# Patient Record
Sex: FEMALE | Race: WHITE | Employment: UNEMPLOYED | URBAN - METROPOLITAN AREA
[De-identification: names, ages, dates, MRNs, and addresses within clinical notes are randomized per-mention and may not be internally consistent; named-entity substitution may affect disease eponyms.]

---

## 2017-05-17 ENCOUNTER — TRANSFERRED RECORDS (OUTPATIENT)
Dept: HEALTH INFORMATION MANAGEMENT | Facility: CLINIC | Age: 62
End: 2017-05-17

## 2017-05-24 ENCOUNTER — HOSPITAL ENCOUNTER (OUTPATIENT)
Dept: CT IMAGING | Facility: CLINIC | Age: 62
End: 2017-05-24
Attending: FAMILY MEDICINE | Admitting: OBSTETRICS & GYNECOLOGY
Payer: COMMERCIAL

## 2017-05-24 ENCOUNTER — HOSPITAL ENCOUNTER (OUTPATIENT)
Facility: CLINIC | Age: 62
Discharge: HOME | End: 2017-05-24
Attending: OBSTETRICS & GYNECOLOGY | Admitting: OBSTETRICS & GYNECOLOGY
Payer: COMMERCIAL

## 2017-05-24 VITALS
SYSTOLIC BLOOD PRESSURE: 130 MMHG | OXYGEN SATURATION: 94 % | HEART RATE: 71 BPM | RESPIRATION RATE: 18 BRPM | TEMPERATURE: 96.6 F | DIASTOLIC BLOOD PRESSURE: 53 MMHG

## 2017-05-24 DIAGNOSIS — G89.29 CHRONIC LOW BACK PAIN WITHOUT SCIATICA: ICD-10-CM

## 2017-05-24 DIAGNOSIS — M54.50 CHRONIC LOW BACK PAIN WITHOUT SCIATICA: ICD-10-CM

## 2017-05-24 DIAGNOSIS — G89.4 CHRONIC PAIN SYNDROME: ICD-10-CM

## 2017-05-24 DIAGNOSIS — G62.9 NEUROPATHY: ICD-10-CM

## 2017-05-24 PROCEDURE — 72129 CT CHEST SPINE W/DYE: CPT

## 2017-05-24 PROCEDURE — 72132 CT LUMBAR SPINE W/DYE: CPT

## 2017-05-24 PROCEDURE — 72125 CT NECK SPINE W/O DYE: CPT

## 2017-05-24 PROCEDURE — 25000125 ZZHC RX 250: Performed by: OBSTETRICS & GYNECOLOGY

## 2017-05-24 PROCEDURE — 72128 CT CHEST SPINE W/O DYE: CPT

## 2017-05-24 PROCEDURE — 40000863 ZZH STATISTIC RADIOLOGY XRAY, US, CT, MAR, NM

## 2017-05-24 PROCEDURE — 25000128 H RX IP 250 OP 636: Performed by: OBSTETRICS & GYNECOLOGY

## 2017-05-24 PROCEDURE — 72126 CT NECK SPINE W/DYE: CPT

## 2017-05-24 RX ORDER — BUTALBITAL, ACETAMINOPHEN, CAFFEINE AND CODEINE PHOSPHATE 50; 325; 40; 30 MG/1; MG/1; MG/1; MG/1
2 CAPSULE ORAL EVERY 6 HOURS PRN
Status: ON HOLD | COMMUNITY
End: 2020-01-20

## 2017-05-24 RX ORDER — AMOXICILLIN 500 MG/1
500 CAPSULE ORAL DAILY
COMMUNITY

## 2017-05-24 RX ORDER — CLOBETASOL PROPIONATE 0.5 MG/G
CREAM TOPICAL 2 TIMES DAILY PRN
COMMUNITY

## 2017-05-24 RX ORDER — DIAZEPAM 5 MG
5 TABLET ORAL 3 TIMES DAILY
COMMUNITY

## 2017-05-24 RX ORDER — TRIAMTERENE/HYDROCHLOROTHIAZID 37.5-25 MG
1 TABLET ORAL DAILY
Status: ON HOLD | COMMUNITY
End: 2017-08-19

## 2017-05-24 RX ORDER — FENTANYL CITRATE 50 UG/ML
25-50 INJECTION, SOLUTION INTRAMUSCULAR; INTRAVENOUS EVERY 5 MIN PRN
Status: DISCONTINUED | OUTPATIENT
Start: 2017-05-24 | End: 2017-05-24 | Stop reason: HOSPADM

## 2017-05-24 RX ORDER — METHYLPHENIDATE HYDROCHLORIDE 20 MG/1
20 TABLET ORAL 4 TIMES DAILY
COMMUNITY

## 2017-05-24 RX ORDER — OMEPRAZOLE 40 MG/1
40 CAPSULE, DELAYED RELEASE ORAL EVERY MORNING
COMMUNITY

## 2017-05-24 RX ORDER — MULTIVITAMIN,THERAPEUTIC
1 TABLET ORAL DAILY
COMMUNITY

## 2017-05-24 RX ORDER — GABAPENTIN 400 MG/1
800 CAPSULE ORAL 3 TIMES DAILY
COMMUNITY

## 2017-05-24 RX ORDER — NALOXONE HYDROCHLORIDE 0.4 MG/ML
.1-.4 INJECTION, SOLUTION INTRAMUSCULAR; INTRAVENOUS; SUBCUTANEOUS
Status: DISCONTINUED | OUTPATIENT
Start: 2017-05-24 | End: 2017-05-24 | Stop reason: HOSPADM

## 2017-05-24 RX ORDER — FLUMAZENIL 0.1 MG/ML
0.2 INJECTION, SOLUTION INTRAVENOUS
Status: DISCONTINUED | OUTPATIENT
Start: 2017-05-24 | End: 2017-05-24 | Stop reason: HOSPADM

## 2017-05-24 RX ORDER — OXYCODONE HCL 40 MG/1
80 TABLET, FILM COATED, EXTENDED RELEASE ORAL 4 TIMES DAILY
COMMUNITY

## 2017-05-24 RX ORDER — TIZANIDINE HYDROCHLORIDE 2 MG/1
6 CAPSULE, GELATIN COATED ORAL DAILY
COMMUNITY

## 2017-05-24 RX ORDER — HYDROXYZINE PAMOATE 50 MG/1
50 CAPSULE ORAL EVERY 8 HOURS PRN
COMMUNITY

## 2017-05-24 RX ORDER — IBUPROFEN 200 MG
600 TABLET ORAL 4 TIMES DAILY PRN
COMMUNITY

## 2017-05-24 RX ADMIN — MIDAZOLAM HYDROCHLORIDE 1 MG: 1 INJECTION, SOLUTION INTRAMUSCULAR; INTRAVENOUS at 10:25

## 2017-05-24 RX ADMIN — FENTANYL CITRATE 50 MCG: 50 INJECTION, SOLUTION INTRAMUSCULAR; INTRAVENOUS at 10:24

## 2017-05-24 NOTE — PROGRESS NOTES
[facility] Arrival    Reason for Visit: CT with IV sedation    Patient is alert and oriented. C/o pain in back and neck, chronic pain med use, see med list. Labs WNL. All questions & concerns addressed.     Pt resting in bed, denies additional needs at this time, call light within reach. Family at bedside. Daughter and/or  will stay with pt overnight.      1015 Pt to CT at this time.  1025- Fentanyl 50 mcg and Versed 1 mg IV given. Scans taken, O2 per 2L NC on.  1040- Returned to [facility]. Vitals monitored.  1050- daughter called to return to room.  1100- discharge instructions reviewed with patient and daughter.  1110- patient dressing with daughter's assistance  1125- Wheelchair escorted out to waiting car with daughter as

## 2017-05-24 NOTE — IP AVS SNAPSHOT
MRN:[id number]                      After Visit Summary   5/24/2017    Carlie Myers    MRN: [id number]           Visit Information        Department      5/24/2017  8:52 AM [facility]          Review of your medicines      UNREVIEWED medicines. Ask your doctor about these medicines        Dose / Directions    AMBIEN PO        Dose:  12.5 mg   Take 12.5 mg by mouth At Bedtime   Refills:  0       AMOXICILLIN PO        Dose:  500 mg   Take 500 mg by mouth daily Take 4 tabs before and after dental appointments due to mitral valve prolapse   Refills:  0       BUPROPION HCL PO        Dose:  300 mg   Take 300 mg by mouth daily   Refills:  0       butalbital-APAP-caffeine-codeine -96-30 MG per capsule   Commonly known as:  FIORICET WITH codeine   Indication:  Pain        Dose:  2 capsule   Take 2 capsules by mouth 4 times daily   Refills:  0       clobetasol 0.05 % cream   Commonly known as:  TEMOVATE        Dose:  60 g   Apply 60 g topically 2 times daily Apply to affected areas 2 times daily   Refills:  0       etanercept 50 MG/ML injection   Commonly known as:  ENBREL   Indication:  Rheumatoid Arthritis        Dose:  50 mg   Inject 50 mg Subcutaneous once a week   Refills:  0       GABAPENTIN PO        Dose:  800 mg   Take 800 mg by mouth 3 times daily   Refills:  0       HYDROXYZINE PAMOATE PO        Dose:  50 mg   Take 50 mg by mouth every 8 hours   Refills:  0       IBUPROFEN PO        Dose:  600 mg   Take 600 mg by mouth 4 times daily   Refills:  0       multivitamin, therapeutic Tabs tablet        Dose:  1 tablet   Take 1 tablet by mouth daily   Refills:  0       OMEPRAZOLE PO        Dose:  40 mg   Take 40 mg by mouth every morning   Refills:  0       * OXYCONTIN PO   Indication:  Chronic Pain        Dose:  80 mg   Take 80 mg by mouth 4 times daily   Refills:  0       * oxyCODONE 5 MG IR tablet   Commonly known as:  ROXICODONE        Dose:  5-10 mg   Take 1-2 tablets  "(5-10 mg) by mouth every 4 hours as needed for pain   Quantity:  30 tablet   Refills:  0       RITALIN PO        Dose:  20 mg   Take 20 mg by mouth 4 times daily   Refills:  0       SERTRALINE HCL PO        Dose:  100 mg   Take 100 mg by mouth daily Take one and a half tablets by mouth daily   Refills:  0       triamterene-hydrochlorothiazide 37.5-25 MG per tablet   Commonly known as:  MAXZIDE-25        Dose:  1 tablet   Take 1 tablet by mouth daily   Refills:  0       VALIUM PO        Dose:  5 mg   Take 5 mg by mouth 3 times daily   Refills:  0       ZANAFLEX PO        Dose:  2 mg   Take 2 mg by mouth 2 times daily   Refills:  0       * Notice:  This list has 2 medication(s) that are the same as other medications prescribed for you. Read the directions carefully, and ask your doctor or other care provider to review them with you.             Protect others around you: Learn how to safely use, store and throw away your medicines at www.disposemymeds.org.         Follow-ups after your visit         Care Instructions        Further instructions from your care team       Sedation Discharge Instructions     After you go home:      You may resume your normal diet    Have an adult stay with you for 6 hours if you received sedation       For 24 hours - due to the sedation you received:    Relax and take it easy    Do NOT make any important or legal decisions    Do NOT drive or operate machines at home or at work    Do NOT drink alcohol    Activity       You may go back to normal activity in 24 hours    Medicines:      You may resume all medications                   If you have questions call:          [facility] Radiology Dept @ [phone]       Additional Information About Your Visit        MyChart Information     [employer] lets you send messages to your doctor, view your test results, renew your prescriptions, schedule appointments and more. To sign up, go to [email or web address] . Click on \"Log in\" on the " "left side of the screen, which will take you to the Welcome page. Then click on \"Sign up Now\" on the right side of the page.     You will be asked to enter the access code listed below, as well as some personal information. Please follow the directions to create your username and password.     Your access code is: [email or web address]  Expires: 2017 11:00 AM     Your access code will  in 90 days. If you need help or a new code, please call your [facility] clinic or [phone].        Care EveryWhere ID     This is your Care EveryWhere ID. This could be used by other organizations to access your [facility] medical records  [id number]        Your Vitals Were     Blood Pressure Pulse Temperature Respirations Pulse Oximetry       126/53 71 96.6  F (35.9  C) (Oral) 18 95%        Primary Care Provider Office Phone # Fax #    Tej Loyola [phone]      Thank you!     Thank you for choosing [facility] for your care. Our goal is always to provide you with excellent care. Hearing back from our patients is one way we can continue to improve our services. Please take a few minutes to complete the written survey that you may receive in the mail after you visit with us. Thank you!             Medication List: This is a list of all your medications and when to take them. Check marks below indicate your daily home schedule. Keep this list as a reference.      Medications           Morning Afternoon Evening Bedtime As Needed    AMBIEN PO   Take 12.5 mg by mouth At Bedtime                                AMOXICILLIN PO   Take 500 mg by mouth daily Take 4 tabs before and after dental appointments due to mitral valve prolapse                                BUPROPION HCL PO   Take 300 mg by mouth daily                                butalbital-APAP-caffeine-codeine -39-30 MG per capsule   Commonly known as:  FIORICET WITH codeine   Take 2 capsules by mouth 4 times daily                                " clobetasol 0.05 % cream   Commonly known as:  TEMOVATE   Apply 60 g topically 2 times daily Apply to affected areas 2 times daily                                etanercept 50 MG/ML injection   Commonly known as:  ENBREL   Inject 50 mg Subcutaneous once a week                                GABAPENTIN PO   Take 800 mg by mouth 3 times daily                                HYDROXYZINE PAMOATE PO   Take 50 mg by mouth every 8 hours                                IBUPROFEN PO   Take 600 mg by mouth 4 times daily                                multivitamin, therapeutic Tabs tablet   Take 1 tablet by mouth daily                                OMEPRAZOLE PO   Take 40 mg by mouth every morning                                * OXYCONTIN PO   Take 80 mg by mouth 4 times daily                                * oxyCODONE 5 MG IR tablet   Commonly known as:  ROXICODONE   Take 1-2 tablets (5-10 mg) by mouth every 4 hours as needed for pain                                RITALIN PO   Take 20 mg by mouth 4 times daily                                SERTRALINE HCL PO   Take 100 mg by mouth daily Take one and a half tablets by mouth daily                                triamterene-hydrochlorothiazide 37.5-25 MG per tablet   Commonly known as:  MAXZIDE-25   Take 1 tablet by mouth daily                                VALIUM PO   Take 5 mg by mouth 3 times daily                                ZANAFLEX PO   Take 2 mg by mouth 2 times daily                                * Notice:  This list has 2 medication(s) that are the same as other medications prescribed for you. Read the directions carefully, and ask your doctor or other care provider to review them with you.

## 2017-05-24 NOTE — DISCHARGE INSTRUCTIONS
Sedation Discharge Instructions     After you go home:      You may resume your normal diet    Have an adult stay with you for 6 hours if you received sedation       For 24 hours - due to the sedation you received:    Relax and take it easy    Do NOT make any important or legal decisions    Do NOT drive or operate machines at home or at work    Do NOT drink alcohol    Activity       You may go back to normal activity in 24 hours    Medicines:      You may resume all medications                   If you have questions call:          [facility] Radiology Dept @ [phone]

## 2017-05-24 NOTE — IP AVS SNAPSHOT
[place] [address]    [place] [address]    Phone:  [phone]                                       After Visit Summary   5/24/2017    Carlie Myers    MRN: [id number]           After Visit Summary Signature Page     I have received my discharge instructions, and my questions have been answered. I have discussed any challenges I see with this plan with the nurse or doctor.    ..........................................................................................................................................  Patient/Patient Representative Signature      ..........................................................................................................................................  Patient Representative Print Name and Relationship to Patient    ..................................................               ................................................  Date                                            Time    ..........................................................................................................................................  Reviewed by Signature/Title    ...................................................              ..............................................  Date                                                            Time

## 2017-08-06 ENCOUNTER — HOSPITAL ENCOUNTER (INPATIENT)
Facility: CLINIC | Age: 62
LOS: 12 days | Discharge: HOME HEALTH | DRG: 853 | End: 2017-08-19
Attending: INTERNAL MEDICINE | Admitting: INTERNAL MEDICINE
Payer: MEDICARE

## 2017-08-06 ENCOUNTER — TRANSFERRED RECORDS (OUTPATIENT)
Dept: HEALTH INFORMATION MANAGEMENT | Facility: CLINIC | Age: 62
End: 2017-08-06

## 2017-08-06 DIAGNOSIS — J18.9 PARAPNEUMONIC EFFUSION: Primary | ICD-10-CM

## 2017-08-06 DIAGNOSIS — J91.8 PARAPNEUMONIC EFFUSION: Primary | ICD-10-CM

## 2017-08-06 NOTE — IP AVS SNAPSHOT
[place] [facility]    [address] [place] [address]    Phone:  [phone]                                       After Visit Summary   8/6/2017    Carlie Myers    MRN: [id number]           After Visit Summary Signature Page     I have received my discharge instructions, and my questions have been answered. I have discussed any challenges I see with this plan with the nurse or doctor.    ..........................................................................................................................................  Patient/Patient Representative Signature      ..........................................................................................................................................  Patient Representative Print Name and Relationship to Patient    ..................................................               ................................................  Date                                            Time    ..........................................................................................................................................  Reviewed by Signature/Title    ...................................................              ..............................................  Date                                                            Time

## 2017-08-06 NOTE — IP AVS SNAPSHOT
MRN:[id number]                      After Visit Summary   8/6/2017    Carlie Myers    MRN: [id number]           Thank you!     Thank you for choosing [facility] for your care. Our goal is always to provide you with excellent care. Hearing back from our patients is one way we can continue to improve our services. Please take a few minutes to complete the written survey that you may receive in the mail after you visit with us. Thank you!        Patient Information     Date Of Birth          1955        Designated Caregiver       Most Recent Value    Caregiver    Will someone help with your care after discharge? no      About your hospital stay     You were admitted on:  August 6, 2017 You last received care in the:  [place] [address] [facility]    You were discharged on:  August 19, 2017        Reason for your hospital stay       Parapneumonic effusion and sepsis with 3 chest tubes placed                  Who to Call     For medical emergencies, please call 911.  For non-urgent questions about your medical care, please call your primary care provider or clinic, [phone]  For questions related to your surgery, please call your surgery clinic        Attending Provider     Provider Specialty    Jennifer Whitlock MD Internal Medicine    Charlotte Fu MD Internal Medicine       Primary Care Provider Office Phone # Fax #    Tej Loyola [phone]      After Care Instructions     Activity       Your activity upon discharge: activity as tolerated            Diet       Follow this diet upon discharge: Orders Placed This Encounter      Snacks/Supplements Adult: Ensure Plus (Adult); Between Meals      Regular Diet Adult                  Follow-up Appointments     Follow-up and recommended labs and tests        Please follow with Dr. Tej Loyola at [facility] within 7 days. Please call to schedule this appointment [phone]. Recommend  that BMP be rechecked and blood pressure medications be reviewed.  Follow with your rheumatologist in the next 2-3 weeks. Hold Enbrel x 3 more weeks and discuss with rheumatologist before resuming.  Follow up with Dr. Ramesh (Thoracic surgeon) in at [facility] on 8/28. Call office at [phone] for appointment time.                  Additional Services     Home Care OT Referral for Hospital Discharge       OT to eval and treat    Your provider has ordered home care - occupational therapy. If you have not been contacted within 2 days of your discharge please call the department phone number listed on the top of this document.            Home Care PT Referral for Hospital Discharge       PT to eval and treat    Your provider has ordered home care - physical therapy. If you have not been contacted within 2 days of your discharge please call the department phone number listed on the top of this document.                  Further instructions from your care team       [facility]  Discharge Orders & Follow-up Care  Video-Assisted: Thoracoscopy - Thoracotomy    Call Ragini at Dr. Ramesh  office at [phone] to make a return appointment with a chest x-ray on Monday 08-28.  Your appointment will be with either Tonya Becker PA-C or Mary Gilbert PA-C, or Dr. Ramesh.      Our office is located at:  [facility], [address] [place] [address], [address], [place] [address].  Ragini will explain where to park when you call for an appointment.    A. Patient Care  Call Dr Ramesh  office @ [phone] if:  ? Severe chills or a fever or 100.4 F.  temperature on two occasions  ? Increased incisional pain and/or redness  ? Presence of unusual incisional or chest tube site drainage  ? Coughing up bright red blood or greenish-yellow secretions  ? Significant increase in shortness of breath    Pain Relief  You have been given a prescription for narcotic pain medicine.  You may also  "take ibuprofen and acetaminophen over the counter.  Recommended dosages are:  600 mg Ibuprofen every 6 hours as needed and 650 mg Acetaminophen every 4 hours as needed.  Many patients get good pain relief by \"staggering\" these medications.     No driving while on narcotics.     Activity  XX No activity restrictions for a scope procedure/thoracoscopy  ___ No heavy lifting greater than 8-10 pounds on the operative side for 4-6 weeks for a thoracotomy    Wound Care  Remove all dressings in 48 hours and then you may shower.  Wash the incision and chest tube site(s) daily with soap and water. No bathing or immersing incision underwater for approximately 2 weeks or until the chest tube sites are completely healed.     Place a dry gauze dressing over the chest tube site(s) because it(they) will drain a few days.  Do not be alarmed if there is drainage of a large amount of fluid (should be pink or yellow-- or somewhat bloody) either spontaneously or with coughing or exertion. If this happens, just place a large dry gauze dressing over the chest tube site-- it will stop and scab over in about a week or so. Once the drainage stops, then leave the chest tube site(s) open to air.     White steri strips will be present on the incision(s). They will peel off within about 10 days-- otherwise, they will be removed at your post-op appointment.     B. Respiratory  XX Utilize Incentive spirometer 10 times in a row every few hours while awake for a few weeks after discharging home from the hospital        Pending Results     Date and Time Order Name Status Description    8/14/2017 1842 Fungus Culture, non-blood Preliminary     8/14/2017 1842 Anaerobic bacterial culture Preliminary     8/14/2017 1842 AFB Culture Non Blood Preliminary     8/7/2017 0143 Cytology In process             Statement of Approval     Ordered          08/19/17 1508  I have reviewed and agree with all the recommendations and orders detailed in this document.  " "EFFECTIVE NOW     Approved and electronically signed by:  Kaitlin Lai MD             Admission Information     Date & Time Provider Department Dept. Phone    2017 Charlotte Fu MD [place] [address] [facility] [phone]      Your Vitals Were     Blood Pressure Pulse Temperature Respirations Height Weight    147/72 (BP Location: Right arm) 98 98.8  F (37.1  C) (Oral) 16 1.651 m (5' 5\") 78.5 kg (173 lb 1 oz)    Pulse Oximetry BMI (Body Mass Index)                91% 28.8 kg/m2          MyChart Information     [employer] lets you send messages to your doctor, view your test results, renew your prescriptions, schedule appointments and more. To sign up, go to [email or web address] . Click on \"Log in\" on the left side of the screen, which will take you to the Welcome page. Then click on \"Sign up Now\" on the right side of the page.     You will be asked to enter the access code listed below, as well as some personal information. Please follow the directions to create your username and password.     Your access code is: [email or web address]  Expires: 2017 11:00 AM     Your access code will  in 90 days. If you need help or a new code, please call your [place] clinic or [phone].        Care EveryWhere ID     This is your Care EveryWhere ID. This could be used by other organizations to access your [place] medical records  [id number]        Equal Access to Services     [facility] AH: Hadii aneta ku hadasho Soomaali, waaxda luqadaha, qaybta kaalmada adeegyada, pao mckenzie . So [facility] [phone].    ATENCIÓN: Si habla español, tiene a [place] disposición servicios gratuitos de asistencia lingüística. Llame al [phone].    We comply with applicable federal civil rights laws and [place] laws. We do not discriminate on the basis of race, color, national origin, age, disability sex, sexual orientation or gender identity.               Review of your medicines "      START taking        Dose / Directions    amoxicillin-clavulanate 875-125 MG per tablet   Commonly known as:  AUGMENTIN        Dose:  1 tablet   Take 1 tablet by mouth 2 times daily for 14 days . Take first dose evening of 8/19.   Quantity:  28 tablet   Refills:  0       dextromethorphan-guaiFENesin  MG per 12 hr tablet   Commonly known as:  MUCINEX DM        Dose:  1 tablet   Take 1 tablet by mouth 2 times daily   Quantity:  28 tablet   Refills:  0         CONTINUE these medicines which have NOT CHANGED        Dose / Directions    AMBIEN CR PO        Dose:  12.5 mg   Take 12.5 mg by mouth At Bedtime   Refills:  0       AMOXICILLIN PO        Dose:  500 mg   Take 500 mg by mouth daily Take 4 tabs before and after dental appointments due to mitral valve prolapse   Refills:  0       butalbital-APAP-caffeine-codeine -03-30 MG per capsule   Commonly known as:  FIORICET WITH codeine   Indication:  Migraine Headache        Dose:  2 capsule   Take 2 capsules by mouth every 6 hours as needed   Refills:  0       clobetasol 0.05 % cream   Commonly known as:  TEMOVATE   Indication:  psoriasis        Apply topically 2 times daily as needed (psoriasis)   Refills:  0       GABAPENTIN PO        Dose:  800 mg   Take 800 mg by mouth 3 times daily   Refills:  0       HYDROXYZINE PAMOATE PO        Dose:  50 mg   Take 50 mg by mouth every 8 hours   Refills:  0       IBUPROFEN PO        Dose:  600 mg   Take 600 mg by mouth 4 times daily   Refills:  0       multivitamin, therapeutic Tabs tablet        Dose:  1 tablet   Take 1 tablet by mouth daily   Refills:  0       OMEPRAZOLE PO        Dose:  40 mg   Take 40 mg by mouth every morning   Refills:  0       * OXYCONTIN PO   Indication:  Chronic Pain        Dose:  80 mg   Take 80 mg by mouth 4 times daily   Refills:  0       * OXYCODONE HCL PO        Dose:  30 mg   Take 30 mg by mouth 5 times daily   Refills:  0       RITALIN PO        Dose:  20 mg   Take 20 mg by mouth 4  times daily   Refills:  0       VALIUM PO        Dose:  5 mg   Take 5 mg by mouth 3 times daily   Refills:  0       WELLBUTRIN XL PO        Dose:  300 mg   Take 300 mg by mouth daily   Refills:  0       ZANAFLEX PO        Dose:  2 mg   Take 2 mg by mouth 2 times daily   Refills:  0       ZOLOFT PO        Dose:  150 mg   Take 150 mg by mouth daily   Refills:  0       * Notice:  This list has 2 medication(s) that are the same as other medications prescribed for you. Read the directions carefully, and ask your doctor or other care provider to review them with you.      STOP taking     etanercept 50 MG/ML injection   Commonly known as:  ENBREL           triamterene-hydrochlorothiazide 37.5-25 MG per tablet   Commonly known as:  MAXZIDE-25                Where to get your medicines      These medications were sent to [facility] - [place], [place] - [address] [facility] [address]  [address], [address]\[facility] [place] [facility]\""[facility] [address]    Hours:  24-hours Phone:  [phone]     amoxicillin-clavulanate 875-125 MG per tablet         Some of these will need a paper prescription and others can be bought over the counter. Ask your nurse if you have questions.     You don't need a prescription for these medications     dextromethorphan-guaiFENesin  MG per 12 hr tablet                Protect others around you: Learn how to safely use, store and throw away your medicines at www.disposemymeds.org.             Medication List: This is a list of all your medications and when to take them. Check marks below indicate your daily home schedule. Keep this list as a reference.      Medications           Morning Afternoon Evening Bedtime As Needed    AMBIEN CR PO   Take 12.5 mg by mouth At Bedtime                                AMOXICILLIN PO   Take 500 mg by mouth daily Take 4 tabs before and after dental appointments due to mitral valve prolapse                                amoxicillin-clavulanate 875-125  MG per tablet   Commonly known as:  AUGMENTIN   Take 1 tablet by mouth 2 times daily for 14 days . Take first dose evening of 8/19.                                butalbital-APAP-caffeine-codeine -47-30 MG per capsule   Commonly known as:  FIORICET WITH codeine   Take 2 capsules by mouth every 6 hours as needed   Last time this was given:  2 capsules on 8/19/2017  8:57 AM                                clobetasol 0.05 % cream   Commonly known as:  TEMOVATE   Apply topically 2 times daily as needed (psoriasis)                                dextromethorphan-guaiFENesin  MG per 12 hr tablet   Commonly known as:  MUCINEX DM   Take 1 tablet by mouth 2 times daily   Last time this was given:  1 tablet on 8/19/2017  8:59 AM                                GABAPENTIN PO   Take 800 mg by mouth 3 times daily   Last time this was given:  800 mg on 8/19/2017  8:58 AM                                HYDROXYZINE PAMOATE PO   Take 50 mg by mouth every 8 hours   Last time this was given:  50 mg on 8/19/2017  8:57 AM                                IBUPROFEN PO   Take 600 mg by mouth 4 times daily   Last time this was given:  600 mg on 8/19/2017  1:00 PM                                multivitamin, therapeutic Tabs tablet   Take 1 tablet by mouth daily   Last time this was given:  1 tablet on 8/19/2017  8:59 AM                                OMEPRAZOLE PO   Take 40 mg by mouth every morning   Last time this was given:  40 mg on 8/19/2017  8:59 AM                                * OXYCONTIN PO   Take 80 mg by mouth 4 times daily   Last time this was given:  80 mg on 8/19/2017 10:16 AM                                * OXYCODONE HCL PO   Take 30 mg by mouth 5 times daily   Last time this was given:  30 mg on 8/19/2017  1:00 PM                                RITALIN PO   Take 20 mg by mouth 4 times daily   Last time this was given:  20 mg on 8/19/2017  2:31 PM                                VALIUM PO   Take 5 mg by mouth 3  times daily   Last time this was given:  5 mg on 8/19/2017  8:58 AM                                WELLBUTRIN XL PO   Take 300 mg by mouth daily   Last time this was given:  300 mg on 8/19/2017  8:58 AM                                ZANAFLEX PO   Take 2 mg by mouth 2 times daily   Last time this was given:  2 mg on 8/19/2017  8:58 AM                                ZOLOFT PO   Take 150 mg by mouth daily   Last time this was given:  150 mg on 8/19/2017  8:57 AM                                * Notice:  This list has 2 medication(s) that are the same as other medications prescribed for you. Read the directions carefully, and ask your doctor or other care provider to review them with you.

## 2017-08-07 ENCOUNTER — APPOINTMENT (OUTPATIENT)
Dept: GENERAL RADIOLOGY | Facility: CLINIC | Age: 62
DRG: 853 | End: 2017-08-07
Attending: INTERNAL MEDICINE
Payer: MEDICARE

## 2017-08-07 PROBLEM — A41.9 SEPSIS (H): Status: ACTIVE | Noted: 2017-08-07

## 2017-08-07 LAB
ALBUMIN SERPL-MCNC: 2.4 G/DL (ref 3.4–5)
ALBUMIN UR-MCNC: 30 MG/DL
ALP SERPL-CCNC: 100 U/L (ref 40–150)
ALT SERPL W P-5'-P-CCNC: 66 U/L (ref 0–50)
ANION GAP SERPL CALCULATED.3IONS-SCNC: 8 MMOL/L (ref 3–14)
APPEARANCE UR: CLEAR
AST SERPL W P-5'-P-CCNC: 21 U/L (ref 0–45)
BASOPHILS # BLD AUTO: 0 10E9/L (ref 0–0.2)
BASOPHILS NFR BLD AUTO: 0.2 %
BILIRUB SERPL-MCNC: 0.3 MG/DL (ref 0.2–1.3)
BILIRUB UR QL STRIP: NEGATIVE
BUN SERPL-MCNC: 11 MG/DL (ref 7–30)
CALCIUM SERPL-MCNC: 7.8 MG/DL (ref 8.5–10.1)
CHLORIDE SERPL-SCNC: 97 MMOL/L (ref 94–109)
CO2 SERPL-SCNC: 31 MMOL/L (ref 20–32)
COLOR UR AUTO: YELLOW
CREAT SERPL-MCNC: 0.63 MG/DL (ref 0.52–1.04)
DIFFERENTIAL METHOD BLD: ABNORMAL
EOSINOPHIL # BLD AUTO: 0.8 10E9/L (ref 0–0.7)
EOSINOPHIL NFR BLD AUTO: 6.5 %
ERYTHROCYTE [DISTWIDTH] IN BLOOD BY AUTOMATED COUNT: 13.9 % (ref 10–15)
GFR SERPL CREATININE-BSD FRML MDRD: ABNORMAL ML/MIN/1.7M2
GLUCOSE SERPL-MCNC: 115 MG/DL (ref 70–99)
GLUCOSE UR STRIP-MCNC: NEGATIVE MG/DL
HCT VFR BLD AUTO: 31.5 % (ref 35–47)
HGB BLD-MCNC: 10.3 G/DL (ref 11.7–15.7)
HGB UR QL STRIP: NEGATIVE
IMM GRANULOCYTES # BLD: 0 10E9/L (ref 0–0.4)
IMM GRANULOCYTES NFR BLD: 0.2 %
INR PPP: 1.1 (ref 0.86–1.14)
KETONES UR STRIP-MCNC: NEGATIVE MG/DL
LACTATE SERPL-SCNC: 0.6 MMOL/L (ref 0.4–2)
LDH SERPL L TO P-CCNC: 170 U/L (ref 81–234)
LEUKOCYTE ESTERASE UR QL STRIP: NEGATIVE
LYMPHOCYTES # BLD AUTO: 1.9 10E9/L (ref 0.8–5.3)
LYMPHOCYTES NFR BLD AUTO: 15.4 %
MAGNESIUM SERPL-MCNC: 2 MG/DL (ref 1.6–2.3)
MCH RBC QN AUTO: 30.3 PG (ref 26.5–33)
MCHC RBC AUTO-ENTMCNC: 32.7 G/DL (ref 31.5–36.5)
MCV RBC AUTO: 93 FL (ref 78–100)
MONOCYTES # BLD AUTO: 1.4 10E9/L (ref 0–1.3)
MONOCYTES NFR BLD AUTO: 12 %
MUCOUS THREADS #/AREA URNS LPF: PRESENT /LPF
NEUTROPHILS # BLD AUTO: 7.9 10E9/L (ref 1.6–8.3)
NEUTROPHILS NFR BLD AUTO: 65.7 %
NITRATE UR QL: NEGATIVE
PH UR STRIP: 6 PH (ref 5–7)
PLATELET # BLD AUTO: 262 10E9/L (ref 150–450)
POTASSIUM SERPL-SCNC: 3 MMOL/L (ref 3.4–5.3)
POTASSIUM SERPL-SCNC: 3.6 MMOL/L (ref 3.4–5.3)
PROCALCITONIN SERPL-MCNC: 0.89 NG/ML
PROT SERPL-MCNC: 6.4 G/DL (ref 6.8–8.8)
PROT SERPL-MCNC: 6.4 G/DL (ref 6.8–8.8)
RBC # BLD AUTO: 3.4 10E12/L (ref 3.8–5.2)
RBC #/AREA URNS AUTO: 4 /HPF (ref 0–2)
SODIUM SERPL-SCNC: 136 MMOL/L (ref 133–144)
SP GR UR STRIP: 1.02 (ref 1–1.03)
SQUAMOUS #/AREA URNS AUTO: 1 /HPF (ref 0–1)
TROPONIN I SERPL-MCNC: NORMAL UG/L (ref 0–0.04)
URN SPEC COLLECT METH UR: ABNORMAL
UROBILINOGEN UR STRIP-MCNC: NORMAL MG/DL (ref 0–2)
WBC # BLD AUTO: 12 10E9/L (ref 4–11)
WBC #/AREA URNS AUTO: 4 /HPF (ref 0–2)

## 2017-08-07 PROCEDURE — 40000141 ZZH STATISTIC PERIPHERAL IV START W/O US GUIDANCE

## 2017-08-07 PROCEDURE — 84155 ASSAY OF PROTEIN SERUM: CPT | Performed by: INTERNAL MEDICINE

## 2017-08-07 PROCEDURE — 85610 PROTHROMBIN TIME: CPT | Performed by: INTERNAL MEDICINE

## 2017-08-07 PROCEDURE — 85025 COMPLETE CBC W/AUTO DIFF WBC: CPT | Performed by: INTERNAL MEDICINE

## 2017-08-07 PROCEDURE — 99223 1ST HOSP IP/OBS HIGH 75: CPT | Mod: AI | Performed by: INTERNAL MEDICINE

## 2017-08-07 PROCEDURE — A9270 NON-COVERED ITEM OR SERVICE: HCPCS | Mod: GY | Performed by: INTERNAL MEDICINE

## 2017-08-07 PROCEDURE — 84132 ASSAY OF SERUM POTASSIUM: CPT | Performed by: INTERNAL MEDICINE

## 2017-08-07 PROCEDURE — 87040 BLOOD CULTURE FOR BACTERIA: CPT | Performed by: INTERNAL MEDICINE

## 2017-08-07 PROCEDURE — 25000132 ZZH RX MED GY IP 250 OP 250 PS 637: Mod: GY | Performed by: INTERNAL MEDICINE

## 2017-08-07 PROCEDURE — 12000000 ZZH R&B MED SURG/OB

## 2017-08-07 PROCEDURE — 36415 COLL VENOUS BLD VENIPUNCTURE: CPT | Performed by: INTERNAL MEDICINE

## 2017-08-07 PROCEDURE — 83735 ASSAY OF MAGNESIUM: CPT | Performed by: INTERNAL MEDICINE

## 2017-08-07 PROCEDURE — 80053 COMPREHEN METABOLIC PANEL: CPT | Performed by: INTERNAL MEDICINE

## 2017-08-07 PROCEDURE — 71010 XR CHEST PORT 1 VW: CPT

## 2017-08-07 PROCEDURE — 83615 LACTATE (LD) (LDH) ENZYME: CPT | Performed by: INTERNAL MEDICINE

## 2017-08-07 PROCEDURE — 83605 ASSAY OF LACTIC ACID: CPT | Performed by: INTERNAL MEDICINE

## 2017-08-07 PROCEDURE — 25000128 H RX IP 250 OP 636: Performed by: INTERNAL MEDICINE

## 2017-08-07 PROCEDURE — 84484 ASSAY OF TROPONIN QUANT: CPT | Performed by: INTERNAL MEDICINE

## 2017-08-07 PROCEDURE — 81001 URINALYSIS AUTO W/SCOPE: CPT | Performed by: INTERNAL MEDICINE

## 2017-08-07 PROCEDURE — 84145 PROCALCITONIN (PCT): CPT | Performed by: INTERNAL MEDICINE

## 2017-08-07 RX ORDER — ZOLPIDEM TARTRATE 5 MG/1
10 TABLET ORAL
Status: DISCONTINUED | OUTPATIENT
Start: 2017-08-07 | End: 2017-08-19 | Stop reason: HOSPADM

## 2017-08-07 RX ORDER — TIZANIDINE 2 MG/1
2 TABLET ORAL 2 TIMES DAILY
Status: DISCONTINUED | OUTPATIENT
Start: 2017-08-07 | End: 2017-08-19 | Stop reason: HOSPADM

## 2017-08-07 RX ORDER — BUPROPION HYDROCHLORIDE 300 MG/1
300 TABLET ORAL DAILY
Status: DISCONTINUED | OUTPATIENT
Start: 2017-08-07 | End: 2017-08-19 | Stop reason: HOSPADM

## 2017-08-07 RX ORDER — NALOXONE HYDROCHLORIDE 0.4 MG/ML
.1-.4 INJECTION, SOLUTION INTRAMUSCULAR; INTRAVENOUS; SUBCUTANEOUS
Status: DISCONTINUED | OUTPATIENT
Start: 2017-08-07 | End: 2017-08-14

## 2017-08-07 RX ORDER — OXYCODONE HYDROCHLORIDE 5 MG/1
15 TABLET ORAL EVERY 4 HOURS PRN
Status: DISCONTINUED | OUTPATIENT
Start: 2017-08-07 | End: 2017-08-14

## 2017-08-07 RX ORDER — OXYCODONE HYDROCHLORIDE 5 MG/1
5-10 TABLET ORAL
Status: DISCONTINUED | OUTPATIENT
Start: 2017-08-07 | End: 2017-08-14

## 2017-08-07 RX ORDER — POTASSIUM CHLORIDE 29.8 MG/ML
20 INJECTION INTRAVENOUS
Status: DISCONTINUED | OUTPATIENT
Start: 2017-08-07 | End: 2017-08-19 | Stop reason: HOSPADM

## 2017-08-07 RX ORDER — DIAZEPAM 5 MG
5 TABLET ORAL 3 TIMES DAILY
Status: DISCONTINUED | OUTPATIENT
Start: 2017-08-07 | End: 2017-08-19 | Stop reason: HOSPADM

## 2017-08-07 RX ORDER — POTASSIUM CHLORIDE 1.5 G/1.58G
20-40 POWDER, FOR SOLUTION ORAL
Status: DISCONTINUED | OUTPATIENT
Start: 2017-08-07 | End: 2017-08-19 | Stop reason: HOSPADM

## 2017-08-07 RX ORDER — BUPROPION HYDROCHLORIDE 100 MG/1
300 TABLET ORAL DAILY
Status: DISCONTINUED | OUTPATIENT
Start: 2017-08-07 | End: 2017-08-07

## 2017-08-07 RX ORDER — CEFTRIAXONE 1 G/1
1 INJECTION, POWDER, FOR SOLUTION INTRAMUSCULAR; INTRAVENOUS EVERY 24 HOURS
Status: DISCONTINUED | OUTPATIENT
Start: 2017-08-07 | End: 2017-08-12

## 2017-08-07 RX ORDER — IBUPROFEN 600 MG/1
600 TABLET, FILM COATED ORAL 4 TIMES DAILY
Status: DISCONTINUED | OUTPATIENT
Start: 2017-08-07 | End: 2017-08-19 | Stop reason: HOSPADM

## 2017-08-07 RX ORDER — AZITHROMYCIN 250 MG/1
250 TABLET, FILM COATED ORAL DAILY
Status: COMPLETED | OUTPATIENT
Start: 2017-08-08 | End: 2017-08-11

## 2017-08-07 RX ORDER — LIDOCAINE 40 MG/G
CREAM TOPICAL
Status: DISCONTINUED | OUTPATIENT
Start: 2017-08-07 | End: 2017-08-14 | Stop reason: HOSPADM

## 2017-08-07 RX ORDER — ACETAMINOPHEN 325 MG/1
650 TABLET ORAL EVERY 4 HOURS PRN
Status: DISCONTINUED | OUTPATIENT
Start: 2017-08-07 | End: 2017-08-19 | Stop reason: HOSPADM

## 2017-08-07 RX ORDER — METHYLPHENIDATE HYDROCHLORIDE 10 MG/1
20 TABLET ORAL 4 TIMES DAILY
Status: DISCONTINUED | OUTPATIENT
Start: 2017-08-07 | End: 2017-08-19 | Stop reason: HOSPADM

## 2017-08-07 RX ORDER — HYDROMORPHONE HYDROCHLORIDE 1 MG/ML
.3-.5 INJECTION, SOLUTION INTRAMUSCULAR; INTRAVENOUS; SUBCUTANEOUS
Status: DISCONTINUED | OUTPATIENT
Start: 2017-08-07 | End: 2017-08-07

## 2017-08-07 RX ORDER — POTASSIUM CHLORIDE 1500 MG/1
20-40 TABLET, EXTENDED RELEASE ORAL
Status: DISCONTINUED | OUTPATIENT
Start: 2017-08-07 | End: 2017-08-19 | Stop reason: HOSPADM

## 2017-08-07 RX ORDER — HYDROXYZINE PAMOATE 25 MG/1
50 CAPSULE ORAL 3 TIMES DAILY
Status: DISCONTINUED | OUTPATIENT
Start: 2017-08-07 | End: 2017-08-19 | Stop reason: HOSPADM

## 2017-08-07 RX ORDER — CLOBETASOL PROPIONATE 0.5 MG/G
CREAM TOPICAL 2 TIMES DAILY PRN
Status: DISCONTINUED | OUTPATIENT
Start: 2017-08-07 | End: 2017-08-19 | Stop reason: HOSPADM

## 2017-08-07 RX ORDER — TRIAMTERENE/HYDROCHLOROTHIAZID 37.5-25 MG
1 TABLET ORAL DAILY
Status: DISCONTINUED | OUTPATIENT
Start: 2017-08-07 | End: 2017-08-13

## 2017-08-07 RX ORDER — MAGNESIUM SULFATE HEPTAHYDRATE 40 MG/ML
4 INJECTION, SOLUTION INTRAVENOUS EVERY 4 HOURS PRN
Status: DISCONTINUED | OUTPATIENT
Start: 2017-08-07 | End: 2017-08-19 | Stop reason: HOSPADM

## 2017-08-07 RX ORDER — BUTALBITAL, ACETAMINOPHEN, CAFFEINE AND CODEINE PHOSPHATE 50; 325; 40; 30 MG/1; MG/1; MG/1; MG/1
2 CAPSULE ORAL EVERY 6 HOURS PRN
Status: DISCONTINUED | OUTPATIENT
Start: 2017-08-07 | End: 2017-08-15

## 2017-08-07 RX ORDER — BUPROPION HYDROCHLORIDE 300 MG/1
300 TABLET ORAL DAILY
COMMUNITY

## 2017-08-07 RX ORDER — MULTIVITAMIN,THERAPEUTIC
1 TABLET ORAL DAILY
Status: DISCONTINUED | OUTPATIENT
Start: 2017-08-07 | End: 2017-08-19 | Stop reason: HOSPADM

## 2017-08-07 RX ORDER — ZOLPIDEM TARTRATE 12.5 MG/1
12.5 TABLET, FILM COATED, EXTENDED RELEASE ORAL AT BEDTIME
COMMUNITY

## 2017-08-07 RX ORDER — POTASSIUM CL/LIDO/0.9 % NACL 10MEQ/0.1L
10 INTRAVENOUS SOLUTION, PIGGYBACK (ML) INTRAVENOUS
Status: DISCONTINUED | OUTPATIENT
Start: 2017-08-07 | End: 2017-08-19 | Stop reason: HOSPADM

## 2017-08-07 RX ORDER — CALCIUM CARBONATE 500 MG/1
500-1000 TABLET, CHEWABLE ORAL
Status: DISCONTINUED | OUTPATIENT
Start: 2017-08-07 | End: 2017-08-19 | Stop reason: HOSPADM

## 2017-08-07 RX ORDER — ONDANSETRON 2 MG/ML
4 INJECTION INTRAMUSCULAR; INTRAVENOUS EVERY 6 HOURS PRN
Status: DISCONTINUED | OUTPATIENT
Start: 2017-08-07 | End: 2017-08-19 | Stop reason: HOSPADM

## 2017-08-07 RX ORDER — GABAPENTIN 800 MG/1
800 TABLET ORAL 3 TIMES DAILY
Status: DISCONTINUED | OUTPATIENT
Start: 2017-08-07 | End: 2017-08-19 | Stop reason: HOSPADM

## 2017-08-07 RX ORDER — OXYCODONE HCL 40 MG/1
80 TABLET, FILM COATED, EXTENDED RELEASE ORAL 4 TIMES DAILY
Status: DISCONTINUED | OUTPATIENT
Start: 2017-08-07 | End: 2017-08-15

## 2017-08-07 RX ORDER — SODIUM CHLORIDE 9 MG/ML
INJECTION, SOLUTION INTRAVENOUS CONTINUOUS
Status: DISCONTINUED | OUTPATIENT
Start: 2017-08-07 | End: 2017-08-07

## 2017-08-07 RX ORDER — POTASSIUM CHLORIDE 7.45 MG/ML
10 INJECTION INTRAVENOUS
Status: DISCONTINUED | OUTPATIENT
Start: 2017-08-07 | End: 2017-08-19 | Stop reason: HOSPADM

## 2017-08-07 RX ORDER — ONDANSETRON 4 MG/1
4 TABLET, ORALLY DISINTEGRATING ORAL EVERY 6 HOURS PRN
Status: DISCONTINUED | OUTPATIENT
Start: 2017-08-07 | End: 2017-08-19 | Stop reason: HOSPADM

## 2017-08-07 RX ADMIN — DIAZEPAM 5 MG: 5 TABLET ORAL at 21:27

## 2017-08-07 RX ADMIN — BUPROPION HYDROCHLORIDE 300 MG: 300 TABLET, FILM COATED, EXTENDED RELEASE ORAL at 17:06

## 2017-08-07 RX ADMIN — HYDROMORPHONE HYDROCHLORIDE 0.5 MG: 1 INJECTION, SOLUTION INTRAMUSCULAR; INTRAVENOUS; SUBCUTANEOUS at 01:57

## 2017-08-07 RX ADMIN — SERTRALINE HYDROCHLORIDE 150 MG: 100 TABLET ORAL at 17:06

## 2017-08-07 RX ADMIN — CALCIUM CARBONATE (ANTACID) CHEW TAB 500 MG 1000 MG: 500 CHEW TAB at 18:54

## 2017-08-07 RX ADMIN — CEFTRIAXONE 1 G: 1 INJECTION, POWDER, FOR SOLUTION INTRAMUSCULAR; INTRAVENOUS at 21:28

## 2017-08-07 RX ADMIN — GABAPENTIN 800 MG: 800 TABLET, FILM COATED ORAL at 18:10

## 2017-08-07 RX ADMIN — ZOLPIDEM TARTRATE 10 MG: 5 TABLET, FILM COATED ORAL at 21:37

## 2017-08-07 RX ADMIN — OXYCODONE HYDROCHLORIDE 10 MG: 5 TABLET ORAL at 08:59

## 2017-08-07 RX ADMIN — TRIAMTERENE AND HYDROCHLOROTHIAZIDE 1 TABLET: 37.5; 25 TABLET ORAL at 18:10

## 2017-08-07 RX ADMIN — OXYCODONE HYDROCHLORIDE 10 MG: 5 TABLET ORAL at 12:07

## 2017-08-07 RX ADMIN — Medication 10 MEQ: at 04:27

## 2017-08-07 RX ADMIN — IBUPROFEN 600 MG: 600 TABLET ORAL at 18:10

## 2017-08-07 RX ADMIN — OXYCODONE HYDROCHLORIDE 80 MG: 40 TABLET, FILM COATED, EXTENDED RELEASE ORAL at 18:11

## 2017-08-07 RX ADMIN — HYDROXYZINE HYDROCHLORIDE 50 MG: 25 TABLET ORAL at 18:10

## 2017-08-07 RX ADMIN — Medication 10 MEQ: at 05:37

## 2017-08-07 RX ADMIN — IBUPROFEN 600 MG: 600 TABLET ORAL at 21:27

## 2017-08-07 RX ADMIN — GABAPENTIN 800 MG: 800 TABLET, FILM COATED ORAL at 21:27

## 2017-08-07 RX ADMIN — METHYLPHENIDATE HYDROCHLORIDE 20 MG: 10 TABLET ORAL at 18:46

## 2017-08-07 RX ADMIN — OXYCODONE HYDROCHLORIDE 10 MG: 5 TABLET ORAL at 15:47

## 2017-08-07 RX ADMIN — AZITHROMYCIN MONOHYDRATE 500 MG: 500 INJECTION, POWDER, LYOPHILIZED, FOR SOLUTION INTRAVENOUS at 01:57

## 2017-08-07 RX ADMIN — DIAZEPAM 5 MG: 5 TABLET ORAL at 17:07

## 2017-08-07 RX ADMIN — OXYCODONE HYDROCHLORIDE 80 MG: 40 TABLET, FILM COATED, EXTENDED RELEASE ORAL at 21:27

## 2017-08-07 RX ADMIN — Medication 10 MEQ: at 06:57

## 2017-08-07 RX ADMIN — TIZANIDINE 2 MG: 2 TABLET ORAL at 21:27

## 2017-08-07 RX ADMIN — OMEPRAZOLE 40 MG: 20 CAPSULE, DELAYED RELEASE ORAL at 17:07

## 2017-08-07 RX ADMIN — HYDROXYZINE HYDROCHLORIDE 50 MG: 25 TABLET ORAL at 21:27

## 2017-08-07 RX ADMIN — SODIUM CHLORIDE: 9 INJECTION, SOLUTION INTRAVENOUS at 11:33

## 2017-08-07 RX ADMIN — THERA TABS 1 TABLET: TAB at 17:07

## 2017-08-07 RX ADMIN — HYDROMORPHONE HYDROCHLORIDE 1 MG: 1 INJECTION, SOLUTION INTRAMUSCULAR; INTRAVENOUS; SUBCUTANEOUS at 04:22

## 2017-08-07 RX ADMIN — SODIUM CHLORIDE: 9 INJECTION, SOLUTION INTRAVENOUS at 01:56

## 2017-08-07 RX ADMIN — HYDROMORPHONE HYDROCHLORIDE 1 MG: 1 INJECTION, SOLUTION INTRAMUSCULAR; INTRAVENOUS; SUBCUTANEOUS at 06:21

## 2017-08-07 ASSESSMENT — PAIN DESCRIPTION - DESCRIPTORS: DESCRIPTORS: JABBING

## 2017-08-07 NOTE — H&P
CHIEF COMPLAINT:  Shortness of breath, fever.  This is a direct admit from [facility], which is a free-standing urgent care or emergency room in [place].      HISTORY OF PRESENT ILLNESS:  Carlie Myers is a 61-year-old white female with a history of nicotine dependence, neuropathy, psoriatic arthritis, chronic pain syndrome, on multiple narcotics, mitral valve prolapse, who states that she is due for getting an MRI, and saw her primary care doctor on Wednesday, whereby she had a low-grade fever and a cough.  He prescribed her some Augmentin.  She was due to go undergo conscious sedation; however, they held that off for 10 days to allow the infection to settle down.  However, her symptoms have persisted since.  Her temperature has been as high as 103.5 at home.  She has been having a cough but most of it is nonproductive.  She has also been having shortness of breath since Wednesday.  She is not on home oxygen.  Today, she went to [facility] urgent care ER in [place], which is close to where she lives.  She had a chest x-ray which showed pneumonia.  She was administered Rocephin and doxycycline, and subsequently they asked her to be evaluated at [facility] as a direct admit for admission.      PAST MEDICAL HISTORY:   1.  Nicotine dependence.   2.  Chronic pain.   3.  Psoriatic arthritis.   4.  Attention deficit disorder.   5.  Anxiety.      PAST SURGICAL HISTORY:   1.  Bilateral knee replacement.   2.  Multiple surgeries on her lower spine.   3.  Cervical laminectomy.      FAMILY HISTORY:  Significant for thyroid disease in her sister.      SOCIAL HISTORY:  The patient smokes one pack a day.  She does not drink alcohol.      ALLERGIES:  No known drug allergies.      MEDICATIONS AS OUTPATIENT:   1.  Dyazide.   2.  Wellbutrin.   3.  Gabapentin.   4.  Zoloft.   5.  Ambien.   6.  Zanaflex.   7.  Hydroxyzine.   8.  Valium.   9.  Oxycodone.   10.  OxyContin 80 mg four times a day.      REVIEW OF SYSTEMS:  As  mentioned in the HPI.  She denies any chest pain.  Her appetite has been poor.  She has had nausea but no emesis.  She denies any diarrhea.  She does endorse dizziness.  All other systems are extensively reviewed and deemed unremarkable and negative.      PHYSICAL EXAM:   VITAL SIGNS:  Her temperature is 98.2.  Pulse is 87.  Blood pressure is 132/57.  Respiratory rate is 18.  O2 sat is 95% on 3 liters.   GENERAL:  She is alert, awake, oriented, coherent, nontoxic, in no acute distress.   HEENT:  Pupils are equal, round, reactive to light.  Pharynx has no exudate noted.   LUNGS:  She has diffuse crackles bilaterally, more so on the left.   HEART:  Regular rate, S1-S2 normal.  No murmurs, rubs or gallops.   ABDOMEN:  Soft, nontender, nondistended, with good bowel sounds.   EXTREMITIES:  There is no edema.   NEUROLOGIC:  She moves all extremities, but has pain with range of motion.      LABS:  Lab work obtained on her is pending.  We will get a CBC, along with basic blood work, procalcitonin, lactic acid, and chest x-ray.      ASSESSMENT AND PLAN:   1.  Sepsis, as exemplified by fevers as high as 103.5 at home:  Likely due to pneumonia, community-acquired.  She has not recently been hospitalized.  We will place her on IV Rocephin and azithromycin.  We will follow up her cultures.   2.  Chronic pain syndrome:  We will resume her narcotics once her medications are reconciled.   3.  CODE STATUS:  FULL CODE.   4.  She will be admitted as an inpatient.      Addendum.  - Her CXR shows a mod pleural effusion on the L. Will set her up for a thoracentesis.    CARLOS LARA MD             D: 2017 00:34   T: 2017 01:23   MT: [id number]      Name:     MARTIN BONDS   MRN:      [id number]        Account:      [id number]   :      1955           Admitted:     [id number]      Document: [id number]       cc: Tej Loyola MD

## 2017-08-07 NOTE — PROGRESS NOTES
Pt seen and examined.  Chart reviewed.  Admitted for treatment of CAP w/ sepsis.  Continue IV ceftriaxone and azithromycin.  D/c IVF.  US guided left thoracentesis ordered.  Resume all PTA meds except for augmentin.      Jasbir Mccauley MD.   Hospitalist.  [phone], pager.

## 2017-08-07 NOTE — PLAN OF CARE
Problem: Goal Outcome Summary  Goal: Goal Outcome Summary  Outcome: No Change  A/O, anxious and restless. VSS on 1L.  Crackles in LLL.  Sats low-mid 90's on 1L, desats to 89% on RA d/t shallow breathing with side/rib pain, ice packs and Oxycodone given x3 with minimal relief.  Tele NSR.  K+=3.6 at recheck.  Up SBA + walker, steady.  Plan: Pt to have thoracentesis tomorrow AM.  Continue to monitor oxygenation and administer pain meds as needed.

## 2017-08-07 NOTE — PROVIDER NOTIFICATION
MD Notification    Notified Person:  MD    Notified Persons Name: Dr Mccauley    Notification Date/Time: 08/07/17 11:00 AM    Notification Interaction:  Webpaged Physician    Purpose of Notification: Notified MD that pts pain not well managed with current pain meds. Needs PTA meds reconciled so MS contin can be added back in.    Orders Received: No additional orders received.    Comments: Pt to have thoracentesis later today.

## 2017-08-07 NOTE — PLAN OF CARE
Problem: Goal Outcome Summary  Goal: Goal Outcome Summary  Outcome: No Change  Direct admit from 212, arrived on 88 around 2345.  A/O, anxious and restless.  Speech difficult to understand at times.  VSS on 3L.  Crackles in bases.  Sats mid 90's on 3L.  Tele NSR.  IV dilaudid given x3 for side/rib pain.  K+ currently being replaced.  Up A1 + walker, unsteady gate.  CXR upon admission, see results.  Plan for thoracentesis today. Continue to monitor.

## 2017-08-07 NOTE — PHARMACY-VANCOMYCIN DOSING SERVICE
Admission medication history interview status for the 8/6/2017  admission is complete. See EPIC admission navigator for prior to admission medications     Medication history source reliability:Good    Actions taken by pharmacist (provider contacted, etc): Face to face interview with patient.     Additional medication history information not noted on PTA med list :None    Medication reconciliation/reorder completed by provider prior to medication history? No    Time spent in this activity: 10 minutes    Prior to Admission medications    Medication Sig Last Dose Taking? Auth Provider   OXYCODONE HCL PO Take 15 mg by mouth every 4 hours as needed 8/6/2017 at Unknown time Yes Unknown, Entered By History   Sertraline HCl (ZOLOFT PO) Take 150 mg by mouth daily 8/6/2017 at am Yes Unknown, Entered By History   OxyCODONE HCl (OXYCONTIN PO) Take 80 mg by mouth 4 times daily  8/6/2017 at hd Yes Reported, Patient   BUPROPION HCL PO Take 300 mg by mouth daily 8/6/2017 at am Yes Reported, Patient   clobetasol (TEMOVATE) 0.05 % cream Apply 60 g topically 2 times daily Apply to affected areas 2 times daily  Past Week at prn Yes Reported, Patient   DiazePAM (VALIUM PO) Take 5 mg by mouth 3 times daily 8/6/2017 at hs Yes Reported, Patient   GABAPENTIN PO Take 800 mg by mouth 3 times daily 8/6/2017 at hs Yes Reported, Patient   HYDROXYZINE PAMOATE PO Take 50 mg by mouth every 8 hours 8/6/2017 at hs Yes Reported, Patient   IBUPROFEN PO Take 600 mg by mouth 4 times daily 8/6/2017 at hs Yes Reported, Patient   Methylphenidate HCl (RITALIN PO) Take 20 mg by mouth 4 times daily 8/6/2017 at pm Yes Reported, Patient   multivitamin, therapeutic (THERA-VIT) TABS tablet Take 1 tablet by mouth daily 8/6/2017 at am Yes Reported, Patient   OMEPRAZOLE PO Take 40 mg by mouth every morning 8/6/2017 at am Yes Reported, Patient   TiZANidine HCl (ZANAFLEX PO) Take 2 mg by mouth 2 times daily 8/6/2017 at hs Yes Reported, Patient    triamterene-hydrochlorothiazide (MAXZIDE-25) 37.5-25 MG per tablet Take 1 tablet by mouth daily 8/6/2017 at am Yes Reported, Patient   Zolpidem Tartrate (AMBIEN PO) Take 12.5 mg by mouth At Bedtime 8/6/2017 at hs Yes Reported, Patient   AMOXICILLIN PO Take 500 mg by mouth daily Take 4 tabs before and after dental appointments due to mitral valve prolapse Unknown at prn  Reported, Patient   etanercept (ENBREL) 50 MG/ML injection Inject 50 mg Subcutaneous once a week On Wednesdays. 8/2/2017 at am  Reported, Patient   butalbital-APAP-caffeine-codeine (FIORICET WITH CODEINE) -69-30 MG per capsule Take 2 capsules by mouth every 6 hours as needed  Unknown at prn  Reported, Patient

## 2017-08-08 ENCOUNTER — APPOINTMENT (OUTPATIENT)
Dept: ULTRASOUND IMAGING | Facility: CLINIC | Age: 62
DRG: 853 | End: 2017-08-08
Attending: INTERNAL MEDICINE
Payer: MEDICARE

## 2017-08-08 LAB
ANION GAP SERPL CALCULATED.3IONS-SCNC: 7 MMOL/L (ref 3–14)
BASOPHILS # BLD AUTO: 0 10E9/L (ref 0–0.2)
BASOPHILS NFR BLD AUTO: 0.1 %
BUN SERPL-MCNC: 5 MG/DL (ref 7–30)
CALCIUM SERPL-MCNC: 9.1 MG/DL (ref 8.5–10.1)
CHLORIDE SERPL-SCNC: 94 MMOL/L (ref 94–109)
CO2 SERPL-SCNC: 34 MMOL/L (ref 20–32)
CREAT SERPL-MCNC: 0.6 MG/DL (ref 0.52–1.04)
DIFFERENTIAL METHOD BLD: ABNORMAL
EOSINOPHIL # BLD AUTO: 0.6 10E9/L (ref 0–0.7)
EOSINOPHIL NFR BLD AUTO: 6.4 %
ERYTHROCYTE [DISTWIDTH] IN BLOOD BY AUTOMATED COUNT: 14.3 % (ref 10–15)
GFR SERPL CREATININE-BSD FRML MDRD: ABNORMAL ML/MIN/1.7M2
GLUCOSE SERPL-MCNC: 112 MG/DL (ref 70–99)
HCT VFR BLD AUTO: 35.8 % (ref 35–47)
HGB BLD-MCNC: 11.6 G/DL (ref 11.7–15.7)
IMM GRANULOCYTES # BLD: 0.1 10E9/L (ref 0–0.4)
IMM GRANULOCYTES NFR BLD: 0.5 %
LYMPHOCYTES # BLD AUTO: 0.9 10E9/L (ref 0.8–5.3)
LYMPHOCYTES NFR BLD AUTO: 9.5 %
MCH RBC QN AUTO: 30.8 PG (ref 26.5–33)
MCHC RBC AUTO-ENTMCNC: 32.4 G/DL (ref 31.5–36.5)
MCV RBC AUTO: 95 FL (ref 78–100)
MONOCYTES # BLD AUTO: 0.9 10E9/L (ref 0–1.3)
MONOCYTES NFR BLD AUTO: 9.4 %
NEUTROPHILS # BLD AUTO: 7.1 10E9/L (ref 1.6–8.3)
NEUTROPHILS NFR BLD AUTO: 74.1 %
NRBC # BLD AUTO: 0 10*3/UL
NRBC BLD AUTO-RTO: 0 /100
PLATELET # BLD AUTO: 295 10E9/L (ref 150–450)
POTASSIUM SERPL-SCNC: 3.6 MMOL/L (ref 3.4–5.3)
RBC # BLD AUTO: 3.77 10E12/L (ref 3.8–5.2)
SODIUM SERPL-SCNC: 135 MMOL/L (ref 133–144)
WBC # BLD AUTO: 9.6 10E9/L (ref 4–11)

## 2017-08-08 PROCEDURE — 25000128 H RX IP 250 OP 636: Performed by: INTERNAL MEDICINE

## 2017-08-08 PROCEDURE — 94799 UNLISTED PULMONARY SVC/PX: CPT

## 2017-08-08 PROCEDURE — 36415 COLL VENOUS BLD VENIPUNCTURE: CPT | Performed by: INTERNAL MEDICINE

## 2017-08-08 PROCEDURE — 40000863 ZZH STATISTIC RADIOLOGY XRAY, US, CT, MAR, NM

## 2017-08-08 PROCEDURE — 76604 US EXAM CHEST: CPT

## 2017-08-08 PROCEDURE — 40000895 ZZH STATISTIC SLP IP EVAL DEFER

## 2017-08-08 PROCEDURE — A9270 NON-COVERED ITEM OR SERVICE: HCPCS | Mod: GY | Performed by: INTERNAL MEDICINE

## 2017-08-08 PROCEDURE — 80048 BASIC METABOLIC PNL TOTAL CA: CPT | Performed by: INTERNAL MEDICINE

## 2017-08-08 PROCEDURE — 25000132 ZZH RX MED GY IP 250 OP 250 PS 637: Mod: GY | Performed by: INTERNAL MEDICINE

## 2017-08-08 PROCEDURE — 12000000 ZZH R&B MED SURG/OB

## 2017-08-08 PROCEDURE — 99232 SBSQ HOSP IP/OBS MODERATE 35: CPT | Performed by: HOSPITALIST

## 2017-08-08 PROCEDURE — 85025 COMPLETE CBC W/AUTO DIFF WBC: CPT | Performed by: INTERNAL MEDICINE

## 2017-08-08 PROCEDURE — 40000275 ZZH STATISTIC RCP TIME EA 10 MIN

## 2017-08-08 RX ADMIN — CEFTRIAXONE 1 G: 1 INJECTION, POWDER, FOR SOLUTION INTRAMUSCULAR; INTRAVENOUS at 20:28

## 2017-08-08 RX ADMIN — AZITHROMYCIN 250 MG: 250 TABLET, FILM COATED ORAL at 08:57

## 2017-08-08 RX ADMIN — IBUPROFEN 600 MG: 600 TABLET ORAL at 18:18

## 2017-08-08 RX ADMIN — METHYLPHENIDATE HYDROCHLORIDE 20 MG: 10 TABLET ORAL at 18:18

## 2017-08-08 RX ADMIN — GABAPENTIN 800 MG: 800 TABLET, FILM COATED ORAL at 16:30

## 2017-08-08 RX ADMIN — OXYCODONE HYDROCHLORIDE 80 MG: 40 TABLET, FILM COATED, EXTENDED RELEASE ORAL at 14:00

## 2017-08-08 RX ADMIN — DIAZEPAM 5 MG: 5 TABLET ORAL at 21:48

## 2017-08-08 RX ADMIN — OXYCODONE HYDROCHLORIDE 80 MG: 40 TABLET, FILM COATED, EXTENDED RELEASE ORAL at 18:18

## 2017-08-08 RX ADMIN — DIAZEPAM 5 MG: 5 TABLET ORAL at 16:30

## 2017-08-08 RX ADMIN — HYDROXYZINE HYDROCHLORIDE 50 MG: 25 TABLET ORAL at 21:47

## 2017-08-08 RX ADMIN — IBUPROFEN 600 MG: 600 TABLET ORAL at 08:56

## 2017-08-08 RX ADMIN — GABAPENTIN 800 MG: 800 TABLET, FILM COATED ORAL at 21:47

## 2017-08-08 RX ADMIN — IBUPROFEN 600 MG: 600 TABLET ORAL at 13:59

## 2017-08-08 RX ADMIN — THERA TABS 1 TABLET: TAB at 08:57

## 2017-08-08 RX ADMIN — OXYCODONE HYDROCHLORIDE 10 MG: 5 TABLET ORAL at 03:06

## 2017-08-08 RX ADMIN — METHYLPHENIDATE HYDROCHLORIDE 20 MG: 10 TABLET ORAL at 10:36

## 2017-08-08 RX ADMIN — IBUPROFEN 600 MG: 600 TABLET ORAL at 21:47

## 2017-08-08 RX ADMIN — OXYCODONE HYDROCHLORIDE 10 MG: 5 TABLET ORAL at 23:49

## 2017-08-08 RX ADMIN — DIAZEPAM 5 MG: 5 TABLET ORAL at 08:56

## 2017-08-08 RX ADMIN — TIZANIDINE 2 MG: 2 TABLET ORAL at 08:56

## 2017-08-08 RX ADMIN — OXYCODONE HYDROCHLORIDE 10 MG: 5 TABLET ORAL at 07:03

## 2017-08-08 RX ADMIN — METHYLPHENIDATE HYDROCHLORIDE 20 MG: 10 TABLET ORAL at 07:03

## 2017-08-08 RX ADMIN — METHYLPHENIDATE HYDROCHLORIDE 20 MG: 10 TABLET ORAL at 14:00

## 2017-08-08 RX ADMIN — TRIAMTERENE AND HYDROCHLOROTHIAZIDE 1 TABLET: 37.5; 25 TABLET ORAL at 08:56

## 2017-08-08 RX ADMIN — HYDROXYZINE HYDROCHLORIDE 50 MG: 25 TABLET ORAL at 08:57

## 2017-08-08 RX ADMIN — OXYCODONE HYDROCHLORIDE 80 MG: 40 TABLET, FILM COATED, EXTENDED RELEASE ORAL at 08:57

## 2017-08-08 RX ADMIN — BUPROPION HYDROCHLORIDE 300 MG: 300 TABLET, FILM COATED, EXTENDED RELEASE ORAL at 08:57

## 2017-08-08 RX ADMIN — SERTRALINE HYDROCHLORIDE 150 MG: 100 TABLET ORAL at 08:57

## 2017-08-08 RX ADMIN — TIZANIDINE 2 MG: 2 TABLET ORAL at 20:28

## 2017-08-08 RX ADMIN — ZOLPIDEM TARTRATE 10 MG: 5 TABLET, FILM COATED ORAL at 23:49

## 2017-08-08 RX ADMIN — OMEPRAZOLE 40 MG: 20 CAPSULE, DELAYED RELEASE ORAL at 08:57

## 2017-08-08 RX ADMIN — OXYCODONE HYDROCHLORIDE 80 MG: 40 TABLET, FILM COATED, EXTENDED RELEASE ORAL at 21:47

## 2017-08-08 RX ADMIN — HYDROXYZINE HYDROCHLORIDE 50 MG: 25 TABLET ORAL at 16:30

## 2017-08-08 RX ADMIN — GABAPENTIN 800 MG: 800 TABLET, FILM COATED ORAL at 08:56

## 2017-08-08 NOTE — PROGRESS NOTES
[facility]  Hospitalist Progress Note        Richardson Toshia Subhash,   2017        Interval History:      Patient states that she has back pain. Discussed plan of care, verbalized understanding.          Assessment and Plan:        Suspected Sepsis: Likely due to pneumonia, community-acquired.   - IV Rocephin and azithromycin.   - Thoracentesis.   - Follow cultures.     Chronic pain syndrome, back pain: Stable.   - Continue home narcotic regimen.     CODE: FULL CODE.     Disposition: Plan for 2-3 days to discharge, pending thoracentesis, weaning oxygen, and transition to oral antibiotic regimen.                    Physical Exam:      Heart Rate: 103    Blood pressure 141/67, temperature 99  F (37.2  C), temperature source Oral, resp. rate 16, SpO2 95 %.    There were no vitals filed for this visit.    Vital Sign Ranges  Temperature Temp  Av  F (36.1  C)  Min: 95.7  F (35.4  C)  Max: 99  F (37.2  C)   Blood pressure Systolic (24hrs), Av , Min:119 , Max:145        Diastolic (24hrs), Av, Min:51, Max:68      Pulse No Data Recorded   Respirations Resp  Avg: 15.4  Min: 12  Max: 16   Pulse oximetry SpO2  Av.2 %  Min: 93 %  Max: 98 %     Vital Signs with Ranges  Temp:  [95.7  F (35.4  C)-99  F (37.2  C)] 99  F (37.2  C)  Heart Rate:  [] 103  Resp:  [12-16] 16  BP: (119-145)/(51-68) 141/67  SpO2:  [93 %-98 %] 95 %    I/O Last 3 Shifts:   I/O last 3 completed shifts:  In: 569 [I.V.:569]  Out: 1900 [Urine:1900]    I/O past 24 hours:     Intake/Output Summary (Last 24 hours) at 17  Last data filed at 17   Gross per 24 hour   Intake              569 ml   Output             2700 ml   Net            -2131 ml     GENERAL: Alert and oriented. NAD. Conversational, appropriate.   HEENT: Normocephalic. EOMI. No icterus or injection. Nares normal.   LUNGS: Decrement bilaterally, L>R. No dyspnea at rest.   HEART: Regular rate. Extremities perfused.   ABDOMEN: Soft,  nontender, and nondistended. Positive bowel sounds.   EXTREMITIES: No LE edema noted.   NEUROLOGIC: Moves extremities x4. No acute focal neurologic abnormalities noted.          Prior to Admission Medications:        Prescriptions Prior to Admission   Medication Sig Dispense Refill Last Dose     OXYCODONE HCL PO Take 15 mg by mouth every 4 hours as needed   8/6/2017 at Unknown time     Sertraline HCl (ZOLOFT PO) Take 150 mg by mouth daily   8/6/2017 at am     BuPROPion HCl (WELLBUTRIN XL PO) Take 300 mg by mouth daily   8/6/2017 at am     Zolpidem Tartrate (AMBIEN CR PO) Take 12.5 mg by mouth At Bedtime   8/6/2017 at hs     OxyCODONE HCl (OXYCONTIN PO) Take 80 mg by mouth 4 times daily    8/6/2017 at hd     clobetasol (TEMOVATE) 0.05 % cream Apply topically 2 times daily as needed (psoriasis)    Past Week at prn     DiazePAM (VALIUM PO) Take 5 mg by mouth 3 times daily   8/6/2017 at hs     GABAPENTIN PO Take 800 mg by mouth 3 times daily   8/6/2017 at hs     HYDROXYZINE PAMOATE PO Take 50 mg by mouth every 8 hours   8/6/2017 at hs     IBUPROFEN PO Take 600 mg by mouth 4 times daily   8/6/2017 at hs     Methylphenidate HCl (RITALIN PO) Take 20 mg by mouth 4 times daily   8/6/2017 at pm     multivitamin, therapeutic (THERA-VIT) TABS tablet Take 1 tablet by mouth daily   8/6/2017 at am     OMEPRAZOLE PO Take 40 mg by mouth every morning   8/6/2017 at am     TiZANidine HCl (ZANAFLEX PO) Take 2 mg by mouth 2 times daily   8/6/2017 at hs     triamterene-hydrochlorothiazide (MAXZIDE-25) 37.5-25 MG per tablet Take 1 tablet by mouth daily   8/6/2017 at am     AMOXICILLIN PO Take 500 mg by mouth daily Take 4 tabs before and after dental appointments due to mitral valve prolapse   Unknown at prn     etanercept (ENBREL) 50 MG/ML injection Inject 50 mg Subcutaneous once a week On Wednesdays.   8/2/2017 at am     butalbital-APAP-caffeine-codeine (FIORICET WITH CODEINE) -54-30 MG per capsule Take 2 capsules by mouth every 6  hours as needed    Unknown at prn            Medications:        Current Facility-Administered Medications   Medication Last Rate     - MEDICATION INSTRUCTIONS -       Current Facility-Administered Medications   Medication Dose Route Frequency     cefTRIAXone  1 g Intravenous Q24H     sodium chloride (PF)  3 mL Intracatheter Q8H     diazepam (VALIUM) tablet 5 mg  5 mg Oral TID     gabapentin  800 mg Oral TID     hydrOXYzine  50 mg Oral TID     ibuprofen (ADVIL/MOTRIN) tablet 600 mg  600 mg Oral 4x Daily     methylphenidate  20 mg Oral 4x Daily     multivitamin, therapeutic  1 tablet Oral Daily     omeprazole (priLOSEC) CR capsule 40 mg  40 mg Oral QAM     oxyCODONE (OxyCONTIN) 12 hr tablet 80 mg  80 mg Oral 4x Daily     sertraline (ZOLOFT) tablet 150 mg  150 mg Oral Daily     tiZANidine (ZANAFLEX) tablet 2 mg  2 mg Oral BID     triamterene-hydrochlorothiazide  1 tablet Oral Daily     buPROPion (WELLBUTRIN XL) 24 hr tablet 300 mg  300 mg Oral Daily     azithromycin  250 mg Oral Daily     Current Facility-Administered Medications   Medication Dose Route Frequency     naloxone  0.1-0.4 mg Intravenous Q2 Min PRN     acetaminophen  650 mg Oral Q4H PRN     ondansetron  4 mg Oral Q6H PRN    Or     ondansetron  4 mg Intravenous Q6H PRN     potassium chloride  20-40 mEq Oral Q2H PRN     potassium chloride  20-40 mEq Oral or Feeding Tube Q2H PRN     potassium chloride  10 mEq Intravenous Q1H PRN     potassium chloride with lidocaine  10 mEq Intravenous Q1H PRN     potassium chloride  20 mEq Intravenous Q1H PRN     magnesium sulfate  4 g Intravenous Q4H PRN     lidocaine (buffered or not buffered)  1 mL Other Q1H PRN     lidocaine 4%   Topical Q1H PRN     sodium chloride (PF)  3 mL Intracatheter Q1H PRN     - MEDICATION INSTRUCTIONS -   Does not apply Continuous PRN     HYDROmorphone  1 mg Intravenous Q1H PRN     oxyCODONE  5-10 mg Oral Q3H PRN     butalbital-APAP-caffeine-codeine  2 capsule Oral Q6H PRN     clobetasol    Topical BID PRN     oxyCODONE (ROXICODONE) IR tablet 15 mg  15 mg Oral Q4H PRN     zolpidem  10 mg Oral At Bedtime PRN     calcium carbonate  500-1,000 mg Oral Q2H PRN            Data:      Lab data reviewed.     Recent Labs  Lab 08/08/17  0712 08/07/17  1630 08/07/17  1246 08/07/17  0105   HGB 11.6*  --   --  10.3*   MCV 95  --   --  93     --   --  262   INR  --  1.10  --   --      --   --  136   POTASSIUM 3.6  --  3.6 3.0*   CHLORIDE 94  --   --  97   CO2 34*  --   --  31   BUN 5*  --   --  11   CR 0.60  --   --  0.63   ANIONGAP 7  --   --  8   BENNY 9.1  --   --  7.8*   *  --   --  115*   TROPI  --   --   --  <0.015The 99th percentile for upper reference range is 0.045 ug/L.  Troponin values in the range of 0.045 - 0.120 ug/L may be associated with risks of adverse clinical events.           Imaging:      Imaging data reviewed.     Dr. Richardson Cullen D.O.  [facility]  Pager [phone]

## 2017-08-08 NOTE — PLAN OF CARE
Problem: Goal Outcome Summary  Goal: Goal Outcome Summary  Outcome: No Change  A/O.  Restlessness improved after PTA meds ordered yesterday.  VSS on 2L NC.  Tele NSR.  PRN oxycodone given x2 for L sided rib pain, effective.  Plan for thoracentesis today.  D/C pending clinical improvement.  Continue to monitor.

## 2017-08-08 NOTE — PLAN OF CARE
Problem: Goal Outcome Summary  Goal: Goal Outcome Summary  Outcome: No Change   A/O, anxious and restless. VSS on 1L.  Crackles in LLL.  Sats 90's on 1L, desats to 89% on RA d/t shallow breathing with side/rib pain, ice packs and Oxycodone given x1 with relief. Home medications restarted and that improved pain. Tele NSR.  Up SBA + walker, steady. Tolerating a full liquid diet, NPO at midnight. Plan: Pt to have thoracentesis tomorrow AM.  Continue to monitor oxygenation and administer pain meds as needed.  Did give prn Ambien per her request at HS for insomnia.

## 2017-08-08 NOTE — PLAN OF CARE
Problem: Goal Outcome Summary  Goal: Goal Outcome Summary  SLP: Bedside swallow eval orders received. Pt with no known hx of dysphagia. Per discussion with RN pt is tolerating PO w/o difficulty. Will defer bedside swallow eval and complete ST orders.

## 2017-08-08 NOTE — PLAN OF CARE
"Problem: Goal Outcome Summary  Goal: Goal Outcome Summary  Outcome: No Change  A&O. VSS on 2L. Attempted to wean without success. Crackles in bases. Up SBA and walker to bathroom. In recliner all day. Tolerating regular diet. Scheduled thoracentesis today, however it was not done due to not enough fluid. Afterwards, pt became paranoid and anxious, stating she felt that \"something shady\" was happening during the thoracentesis. RN explained that someone was in training downstairs, they scanned her a few times, and then called Dr Cullen to ultimately decide she had <200ml of fluid and it would not be necessary to remove it as pt would not feel any benefits. Pt insisting that they \"saw a tumor\" and that \"we\" are keeping secrets from her. Assurance provided. Tele NSR. Will continue to monitor.        "

## 2017-08-09 LAB
ALBUMIN SERPL-MCNC: 2.4 G/DL (ref 3.4–5)
ALP SERPL-CCNC: 114 U/L (ref 40–150)
ALT SERPL W P-5'-P-CCNC: 53 U/L (ref 0–50)
ANION GAP SERPL CALCULATED.3IONS-SCNC: 7 MMOL/L (ref 3–14)
AST SERPL W P-5'-P-CCNC: 31 U/L (ref 0–45)
BILIRUB SERPL-MCNC: 0.2 MG/DL (ref 0.2–1.3)
BUN SERPL-MCNC: 8 MG/DL (ref 7–30)
CALCIUM SERPL-MCNC: 9.1 MG/DL (ref 8.5–10.1)
CHLORIDE SERPL-SCNC: 93 MMOL/L (ref 94–109)
CO2 SERPL-SCNC: 35 MMOL/L (ref 20–32)
CREAT SERPL-MCNC: 0.58 MG/DL (ref 0.52–1.04)
ERYTHROCYTE [DISTWIDTH] IN BLOOD BY AUTOMATED COUNT: 14.2 % (ref 10–15)
GFR SERPL CREATININE-BSD FRML MDRD: ABNORMAL ML/MIN/1.7M2
GLUCOSE SERPL-MCNC: 97 MG/DL (ref 70–99)
HCT VFR BLD AUTO: 34.9 % (ref 35–47)
HGB BLD-MCNC: 11.4 G/DL (ref 11.7–15.7)
LACTATE BLD-SCNC: 0.7 MMOL/L (ref 0.7–2.1)
MCH RBC QN AUTO: 31.1 PG (ref 26.5–33)
MCHC RBC AUTO-ENTMCNC: 32.7 G/DL (ref 31.5–36.5)
MCV RBC AUTO: 95 FL (ref 78–100)
NT-PROBNP SERPL-MCNC: 98 PG/ML (ref 0–900)
PLATELET # BLD AUTO: 293 10E9/L (ref 150–450)
POTASSIUM SERPL-SCNC: 3.4 MMOL/L (ref 3.4–5.3)
PROCALCITONIN SERPL-MCNC: 0.63 NG/ML
PROT SERPL-MCNC: 7.2 G/DL (ref 6.8–8.8)
RBC # BLD AUTO: 3.66 10E12/L (ref 3.8–5.2)
SODIUM SERPL-SCNC: 135 MMOL/L (ref 133–144)
TROPONIN I SERPL-MCNC: NORMAL UG/L (ref 0–0.04)
WBC # BLD AUTO: 6.9 10E9/L (ref 4–11)

## 2017-08-09 PROCEDURE — 83605 ASSAY OF LACTIC ACID: CPT | Performed by: HOSPITALIST

## 2017-08-09 PROCEDURE — 80053 COMPREHEN METABOLIC PANEL: CPT | Performed by: HOSPITALIST

## 2017-08-09 PROCEDURE — 36415 COLL VENOUS BLD VENIPUNCTURE: CPT | Performed by: HOSPITALIST

## 2017-08-09 PROCEDURE — 25000132 ZZH RX MED GY IP 250 OP 250 PS 637: Mod: GY | Performed by: INTERNAL MEDICINE

## 2017-08-09 PROCEDURE — 99232 SBSQ HOSP IP/OBS MODERATE 35: CPT | Performed by: HOSPITALIST

## 2017-08-09 PROCEDURE — 83880 ASSAY OF NATRIURETIC PEPTIDE: CPT | Performed by: HOSPITALIST

## 2017-08-09 PROCEDURE — 40000275 ZZH STATISTIC RCP TIME EA 10 MIN

## 2017-08-09 PROCEDURE — 12000000 ZZH R&B MED SURG/OB

## 2017-08-09 PROCEDURE — 85027 COMPLETE CBC AUTOMATED: CPT | Performed by: HOSPITALIST

## 2017-08-09 PROCEDURE — 84484 ASSAY OF TROPONIN QUANT: CPT | Performed by: HOSPITALIST

## 2017-08-09 PROCEDURE — 25000128 H RX IP 250 OP 636: Performed by: HOSPITALIST

## 2017-08-09 PROCEDURE — A9270 NON-COVERED ITEM OR SERVICE: HCPCS | Mod: GY | Performed by: INTERNAL MEDICINE

## 2017-08-09 PROCEDURE — 84145 PROCALCITONIN (PCT): CPT | Performed by: HOSPITALIST

## 2017-08-09 PROCEDURE — 94799 UNLISTED PULMONARY SVC/PX: CPT

## 2017-08-09 PROCEDURE — 94762 N-INVAS EAR/PLS OXIMTRY CONT: CPT

## 2017-08-09 PROCEDURE — 25000128 H RX IP 250 OP 636: Performed by: INTERNAL MEDICINE

## 2017-08-09 PROCEDURE — 87040 BLOOD CULTURE FOR BACTERIA: CPT | Performed by: HOSPITALIST

## 2017-08-09 RX ORDER — FUROSEMIDE 10 MG/ML
20 INJECTION INTRAMUSCULAR; INTRAVENOUS ONCE
Status: COMPLETED | OUTPATIENT
Start: 2017-08-09 | End: 2017-08-09

## 2017-08-09 RX ADMIN — IBUPROFEN 600 MG: 600 TABLET ORAL at 08:45

## 2017-08-09 RX ADMIN — OXYCODONE HYDROCHLORIDE 10 MG: 5 TABLET ORAL at 11:43

## 2017-08-09 RX ADMIN — METHYLPHENIDATE HYDROCHLORIDE 20 MG: 10 TABLET ORAL at 17:50

## 2017-08-09 RX ADMIN — IBUPROFEN 600 MG: 600 TABLET ORAL at 13:40

## 2017-08-09 RX ADMIN — FUROSEMIDE 20 MG: 10 INJECTION, SOLUTION INTRAVENOUS at 09:53

## 2017-08-09 RX ADMIN — HYDROXYZINE HYDROCHLORIDE 50 MG: 25 TABLET ORAL at 08:44

## 2017-08-09 RX ADMIN — GABAPENTIN 800 MG: 800 TABLET, FILM COATED ORAL at 16:01

## 2017-08-09 RX ADMIN — OXYCODONE HYDROCHLORIDE 10 MG: 5 TABLET ORAL at 16:06

## 2017-08-09 RX ADMIN — OXYCODONE HYDROCHLORIDE 80 MG: 40 TABLET, FILM COATED, EXTENDED RELEASE ORAL at 08:44

## 2017-08-09 RX ADMIN — DIAZEPAM 5 MG: 5 TABLET ORAL at 08:45

## 2017-08-09 RX ADMIN — HYDROXYZINE HYDROCHLORIDE 50 MG: 25 TABLET ORAL at 21:04

## 2017-08-09 RX ADMIN — CEFTRIAXONE 1 G: 1 INJECTION, POWDER, FOR SOLUTION INTRAMUSCULAR; INTRAVENOUS at 20:07

## 2017-08-09 RX ADMIN — DIAZEPAM 5 MG: 5 TABLET ORAL at 16:01

## 2017-08-09 RX ADMIN — GABAPENTIN 800 MG: 800 TABLET, FILM COATED ORAL at 08:45

## 2017-08-09 RX ADMIN — DIAZEPAM 5 MG: 5 TABLET ORAL at 21:05

## 2017-08-09 RX ADMIN — METHYLPHENIDATE HYDROCHLORIDE 20 MG: 10 TABLET ORAL at 07:05

## 2017-08-09 RX ADMIN — OXYCODONE HYDROCHLORIDE 80 MG: 40 TABLET, FILM COATED, EXTENDED RELEASE ORAL at 13:40

## 2017-08-09 RX ADMIN — AZITHROMYCIN 250 MG: 250 TABLET, FILM COATED ORAL at 08:48

## 2017-08-09 RX ADMIN — IBUPROFEN 600 MG: 600 TABLET ORAL at 21:05

## 2017-08-09 RX ADMIN — SERTRALINE HYDROCHLORIDE 150 MG: 100 TABLET ORAL at 08:44

## 2017-08-09 RX ADMIN — TRIAMTERENE AND HYDROCHLOROTHIAZIDE 1 TABLET: 37.5; 25 TABLET ORAL at 08:44

## 2017-08-09 RX ADMIN — METHYLPHENIDATE HYDROCHLORIDE 20 MG: 10 TABLET ORAL at 09:53

## 2017-08-09 RX ADMIN — BUPROPION HYDROCHLORIDE 300 MG: 300 TABLET, FILM COATED, EXTENDED RELEASE ORAL at 08:44

## 2017-08-09 RX ADMIN — HYDROXYZINE HYDROCHLORIDE 50 MG: 25 TABLET ORAL at 16:01

## 2017-08-09 RX ADMIN — OXYCODONE HYDROCHLORIDE 80 MG: 40 TABLET, FILM COATED, EXTENDED RELEASE ORAL at 21:05

## 2017-08-09 RX ADMIN — OXYCODONE HYDROCHLORIDE 10 MG: 5 TABLET ORAL at 04:43

## 2017-08-09 RX ADMIN — METHYLPHENIDATE HYDROCHLORIDE 20 MG: 10 TABLET ORAL at 13:40

## 2017-08-09 RX ADMIN — OMEPRAZOLE 40 MG: 20 CAPSULE, DELAYED RELEASE ORAL at 08:44

## 2017-08-09 RX ADMIN — ACETAMINOPHEN 650 MG: 325 TABLET, FILM COATED ORAL at 10:37

## 2017-08-09 RX ADMIN — THERA TABS 1 TABLET: TAB at 08:45

## 2017-08-09 RX ADMIN — TIZANIDINE 2 MG: 2 TABLET ORAL at 08:45

## 2017-08-09 RX ADMIN — OXYCODONE HYDROCHLORIDE 10 MG: 5 TABLET ORAL at 19:45

## 2017-08-09 RX ADMIN — GABAPENTIN 800 MG: 800 TABLET, FILM COATED ORAL at 21:05

## 2017-08-09 RX ADMIN — IBUPROFEN 600 MG: 600 TABLET ORAL at 17:51

## 2017-08-09 RX ADMIN — OXYCODONE HYDROCHLORIDE 80 MG: 40 TABLET, FILM COATED, EXTENDED RELEASE ORAL at 17:50

## 2017-08-09 RX ADMIN — TIZANIDINE 2 MG: 2 TABLET ORAL at 20:07

## 2017-08-09 RX ADMIN — ZOLPIDEM TARTRATE 10 MG: 5 TABLET, FILM COATED ORAL at 22:19

## 2017-08-09 NOTE — PLAN OF CARE
Problem: Goal Outcome Summary  Goal: Goal Outcome Summary  Outcome: No Change  A/OX4. VSS. Currently on 2L O2 NC, crackles in base of lungs. IS education done this evening. Good appetite this evening. No BM this shift. Voiding adequately. Walked to bathroom x2 this evening. L PIV infiltrated this evening. New R hand PIV inserted, currently SL. Will continue to monitor.

## 2017-08-09 NOTE — PLAN OF CARE
Problem: Goal Outcome Summary  Goal: Goal Outcome Summary  Outcome: Improving  Pt c/o left sided flank pain, md aware.  Infrequent cough.  LLL light crackles, diminished. O2 @ 1L 95%, weaning.  Sudden onset fever this am T102, tylenol given & lactic acid protochol (normal results), currently afebrile.  Back pain managed with PTA med regimen.  Tele ST at 100.  Up with SBA/Walker/GB to bathroom and in room.  Plan for overnight oximetry tonight.

## 2017-08-09 NOTE — PROVIDER NOTIFICATION
Writer alerted pt is febrile.  Temp 102.  Text to Dr Cullen at 1035.  He will enter orders and lactic acid ami chol followed.  Tylenol given.

## 2017-08-09 NOTE — PROGRESS NOTES
[facility]  Hospitalist Progress Note        Richardson Cullen,   2017        Interval History:      Patient with fever today, lactate normal, repeat blood cultures collected. Discussed plan of care.          Assessment and Plan:        Carlie Myers is a 61-year-old white female with a history of nicotine dependence, neuropathy, psoriatic arthritis, chronic pain syndrome, on multiple narcotics, mitral valve prolapse, who states that she is due for getting an MRI, and saw her primary care doctor on Wednesday, whereby she had a low-grade fever and a cough.    Suspected Sepsis, CAP: Likely due to pneumonia, community acquired.   - IV Rocephin and azithromycin.   - Thoracentesis.   - Follow cultures.   - Pulmonary hygiene.   - Wean oxygen as able.      Chronic pain syndrome, back pain: Stable.   - Continue home narcotic regimen.   - Overnight oximetry to screen for [place].      CODE: FULL.      Disposition: Plan for 1-2 days to discharge, pending weaning oxygen, and transition to oral antibiotic regimen.                    Physical Exam:      Heart Rate: 97    Blood pressure 130/50, temperature 97.5  F (36.4  C), resp. rate 16, SpO2 90 %.    There were no vitals filed for this visit.    Vital Sign Ranges  Temperature Temp  Av  F (36.1  C)  Min: 95.5  F (35.3  C)  Max: 98  F (36.7  C)   Blood pressure Systolic (24hrs), Av , Min:112 , Max:130        Diastolic (24hrs), Av, Min:48, Max:73      Pulse No Data Recorded   Respirations Resp  Av  Min: 16  Max: 20   Pulse oximetry SpO2  Av.3 %  Min: 90 %  Max: 97 %     Vital Signs with Ranges  Temp:  [95.5  F (35.3  C)-98  F (36.7  C)] 97.5  F (36.4  C)  Heart Rate:  [85-99] 97  Resp:  [16-20] 16  BP: (112-130)/(48-73) 130/50  SpO2:  [90 %-97 %] 90 %    I/O Last 3 Shifts:   I/O last 3 completed shifts:  In: -   Out: 1700 [Urine:1700]    I/O past 24 hours:     Intake/Output Summary (Last 24 hours) at 17 0980  Last data filed  at 08/09/17 0439   Gross per 24 hour   Intake                0 ml   Output              900 ml   Net             -900 ml     GENERAL: Alert and oriented. NAD. Conversational, appropriate.   HEENT: Normocephalic. EOMI. No icterus or injection. Nares normal.   LUNGS: Decrement bilaterally, L>R, improving. No dyspnea at rest.   HEART: Regular rate. Extremities perfused.   ABDOMEN: Soft, nontender, and nondistended. Positive bowel sounds.   EXTREMITIES: No LE edema noted.   NEUROLOGIC: Moves extremities x4. No acute focal neurologic abnormalities noted.          Prior to Admission Medications:        Prescriptions Prior to Admission   Medication Sig Dispense Refill Last Dose     OXYCODONE HCL PO Take 15 mg by mouth every 4 hours as needed   8/6/2017 at Unknown time     Sertraline HCl (ZOLOFT PO) Take 150 mg by mouth daily   8/6/2017 at am     BuPROPion HCl (WELLBUTRIN XL PO) Take 300 mg by mouth daily   8/6/2017 at am     Zolpidem Tartrate (AMBIEN CR PO) Take 12.5 mg by mouth At Bedtime   8/6/2017 at hs     OxyCODONE HCl (OXYCONTIN PO) Take 80 mg by mouth 4 times daily    8/6/2017 at hd     clobetasol (TEMOVATE) 0.05 % cream Apply topically 2 times daily as needed (psoriasis)    Past Week at prn     DiazePAM (VALIUM PO) Take 5 mg by mouth 3 times daily   8/6/2017 at hs     GABAPENTIN PO Take 800 mg by mouth 3 times daily   8/6/2017 at hs     HYDROXYZINE PAMOATE PO Take 50 mg by mouth every 8 hours   8/6/2017 at hs     IBUPROFEN PO Take 600 mg by mouth 4 times daily   8/6/2017 at hs     Methylphenidate HCl (RITALIN PO) Take 20 mg by mouth 4 times daily   8/6/2017 at pm     multivitamin, therapeutic (THERA-VIT) TABS tablet Take 1 tablet by mouth daily   8/6/2017 at am     OMEPRAZOLE PO Take 40 mg by mouth every morning   8/6/2017 at am     TiZANidine HCl (ZANAFLEX PO) Take 2 mg by mouth 2 times daily   8/6/2017 at hs     triamterene-hydrochlorothiazide (MAXZIDE-25) 37.5-25 MG per tablet Take 1 tablet by mouth daily    8/6/2017 at am     AMOXICILLIN PO Take 500 mg by mouth daily Take 4 tabs before and after dental appointments due to mitral valve prolapse   Unknown at prn     etanercept (ENBREL) 50 MG/ML injection Inject 50 mg Subcutaneous once a week On Wednesdays.   8/2/2017 at am     butalbital-APAP-caffeine-codeine (FIORICET WITH CODEINE) -63-30 MG per capsule Take 2 capsules by mouth every 6 hours as needed    Unknown at prn            Medications:        Current Facility-Administered Medications   Medication Last Rate     - MEDICATION INSTRUCTIONS -       Current Facility-Administered Medications   Medication Dose Route Frequency     furosemide  20 mg Intravenous Once     cefTRIAXone  1 g Intravenous Q24H     sodium chloride (PF)  3 mL Intracatheter Q8H     diazepam (VALIUM) tablet 5 mg  5 mg Oral TID     gabapentin  800 mg Oral TID     hydrOXYzine  50 mg Oral TID     ibuprofen (ADVIL/MOTRIN) tablet 600 mg  600 mg Oral 4x Daily     methylphenidate  20 mg Oral 4x Daily     multivitamin, therapeutic  1 tablet Oral Daily     omeprazole (priLOSEC) CR capsule 40 mg  40 mg Oral QAM     oxyCODONE (OxyCONTIN) 12 hr tablet 80 mg  80 mg Oral 4x Daily     sertraline (ZOLOFT) tablet 150 mg  150 mg Oral Daily     tiZANidine (ZANAFLEX) tablet 2 mg  2 mg Oral BID     triamterene-hydrochlorothiazide  1 tablet Oral Daily     buPROPion (WELLBUTRIN XL) 24 hr tablet 300 mg  300 mg Oral Daily     azithromycin  250 mg Oral Daily     Current Facility-Administered Medications   Medication Dose Route Frequency     naloxone  0.1-0.4 mg Intravenous Q2 Min PRN     acetaminophen  650 mg Oral Q4H PRN     ondansetron  4 mg Oral Q6H PRN    Or     ondansetron  4 mg Intravenous Q6H PRN     potassium chloride  20-40 mEq Oral Q2H PRN     potassium chloride  20-40 mEq Oral or Feeding Tube Q2H PRN     potassium chloride  10 mEq Intravenous Q1H PRN     potassium chloride with lidocaine  10 mEq Intravenous Q1H PRN     potassium chloride  20 mEq Intravenous  Q1H PRN     magnesium sulfate  4 g Intravenous Q4H PRN     lidocaine (buffered or not buffered)  1 mL Other Q1H PRN     lidocaine 4%   Topical Q1H PRN     sodium chloride (PF)  3 mL Intracatheter Q1H PRN     - MEDICATION INSTRUCTIONS -   Does not apply Continuous PRN     HYDROmorphone  1 mg Intravenous Q1H PRN     oxyCODONE  5-10 mg Oral Q3H PRN     butalbital-APAP-caffeine-codeine  2 capsule Oral Q6H PRN     clobetasol   Topical BID PRN     oxyCODONE (ROXICODONE) IR tablet 15 mg  15 mg Oral Q4H PRN     zolpidem  10 mg Oral At Bedtime PRN     calcium carbonate  500-1,000 mg Oral Q2H PRN            Data:      Lab data reviewed.     Recent Labs  Lab 08/09/17  0717 08/08/17  0712 08/07/17  1630 08/07/17  1246 08/07/17  0105   HGB 11.4* 11.6*  --   --  10.3*   MCV 95 95  --   --  93    295  --   --  262   INR  --   --  1.10  --   --     135  --   --  136   POTASSIUM 3.4 3.6  --  3.6 3.0*   CHLORIDE 93* 94  --   --  97   CO2 35* 34*  --   --  31   BUN 8 5*  --   --  11   CR 0.58 0.60  --   --  0.63   ANIONGAP 7 7  --   --  8   BENNY 9.1 9.1  --   --  7.8*   GLC 97 112*  --   --  115*   TROPI <0.015The 99th percentile for upper reference range is 0.045 ug/L.  Troponin values in the range of 0.045 - 0.120 ug/L may be associated with risks of adverse clinical events.  --   --   --  <0.015The 99th percentile for upper reference range is 0.045 ug/L.  Troponin values in the range of 0.045 - 0.120 ug/L may be associated with risks of adverse clinical events.           Imaging:      Imaging data reviewed.     Dr. Richardson Cullen D.O.  [facility]  Pager [phone]

## 2017-08-09 NOTE — PLAN OF CARE
Problem: Goal Outcome Summary  Goal: Goal Outcome Summary  Outcome: No Change  A/O.  VSS on 2L.  Tele NSR.  PRN oxycodone given x2 for L sided rib pain, effective.  Infrequent NP cough.  D/C pending clinical course.  Continue to monitor.

## 2017-08-10 ENCOUNTER — APPOINTMENT (OUTPATIENT)
Dept: GENERAL RADIOLOGY | Facility: CLINIC | Age: 62
DRG: 853 | End: 2017-08-10
Attending: HOSPITALIST
Payer: MEDICARE

## 2017-08-10 LAB
ANION GAP SERPL CALCULATED.3IONS-SCNC: 8 MMOL/L (ref 3–14)
BUN SERPL-MCNC: 11 MG/DL (ref 7–30)
CALCIUM SERPL-MCNC: 9 MG/DL (ref 8.5–10.1)
CHLORIDE SERPL-SCNC: 90 MMOL/L (ref 94–109)
CO2 SERPL-SCNC: 33 MMOL/L (ref 20–32)
CREAT SERPL-MCNC: 0.66 MG/DL (ref 0.52–1.04)
ERYTHROCYTE [DISTWIDTH] IN BLOOD BY AUTOMATED COUNT: 14.6 % (ref 10–15)
GFR SERPL CREATININE-BSD FRML MDRD: ABNORMAL ML/MIN/1.7M2
GLUCOSE SERPL-MCNC: 113 MG/DL (ref 70–99)
HCT VFR BLD AUTO: 32.9 % (ref 35–47)
HGB BLD-MCNC: 10.9 G/DL (ref 11.7–15.7)
MAGNESIUM SERPL-MCNC: 2.3 MG/DL (ref 1.6–2.3)
MCH RBC QN AUTO: 31.1 PG (ref 26.5–33)
MCHC RBC AUTO-ENTMCNC: 33.1 G/DL (ref 31.5–36.5)
MCV RBC AUTO: 94 FL (ref 78–100)
PLATELET # BLD AUTO: 268 10E9/L (ref 150–450)
POTASSIUM SERPL-SCNC: 3.5 MMOL/L (ref 3.4–5.3)
PROCALCITONIN SERPL-MCNC: 0.48 NG/ML
RBC # BLD AUTO: 3.51 10E12/L (ref 3.8–5.2)
SODIUM SERPL-SCNC: 131 MMOL/L (ref 133–144)
WBC # BLD AUTO: 5.3 10E9/L (ref 4–11)

## 2017-08-10 PROCEDURE — 40000275 ZZH STATISTIC RCP TIME EA 10 MIN

## 2017-08-10 PROCEDURE — 94762 N-INVAS EAR/PLS OXIMTRY CONT: CPT

## 2017-08-10 PROCEDURE — 25000128 H RX IP 250 OP 636: Performed by: INTERNAL MEDICINE

## 2017-08-10 PROCEDURE — 12000000 ZZH R&B MED SURG/OB

## 2017-08-10 PROCEDURE — 85027 COMPLETE CBC AUTOMATED: CPT | Performed by: HOSPITALIST

## 2017-08-10 PROCEDURE — 84145 PROCALCITONIN (PCT): CPT | Performed by: HOSPITALIST

## 2017-08-10 PROCEDURE — 25000132 ZZH RX MED GY IP 250 OP 250 PS 637: Mod: GY | Performed by: INTERNAL MEDICINE

## 2017-08-10 PROCEDURE — A9270 NON-COVERED ITEM OR SERVICE: HCPCS | Mod: GY | Performed by: INTERNAL MEDICINE

## 2017-08-10 PROCEDURE — 36415 COLL VENOUS BLD VENIPUNCTURE: CPT | Performed by: HOSPITALIST

## 2017-08-10 PROCEDURE — 71010 XR CHEST PORT 1 VW: CPT

## 2017-08-10 PROCEDURE — 99232 SBSQ HOSP IP/OBS MODERATE 35: CPT | Performed by: INTERNAL MEDICINE

## 2017-08-10 PROCEDURE — 83735 ASSAY OF MAGNESIUM: CPT | Performed by: HOSPITALIST

## 2017-08-10 PROCEDURE — 80048 BASIC METABOLIC PNL TOTAL CA: CPT | Performed by: HOSPITALIST

## 2017-08-10 RX ORDER — BENZONATATE 100 MG/1
100 CAPSULE ORAL 3 TIMES DAILY PRN
Status: DISCONTINUED | OUTPATIENT
Start: 2017-08-10 | End: 2017-08-19 | Stop reason: HOSPADM

## 2017-08-10 RX ADMIN — METHYLPHENIDATE HYDROCHLORIDE 20 MG: 10 TABLET ORAL at 13:18

## 2017-08-10 RX ADMIN — IBUPROFEN 600 MG: 600 TABLET ORAL at 13:18

## 2017-08-10 RX ADMIN — OXYCODONE HYDROCHLORIDE 80 MG: 40 TABLET, FILM COATED, EXTENDED RELEASE ORAL at 13:18

## 2017-08-10 RX ADMIN — OXYCODONE HYDROCHLORIDE 10 MG: 5 TABLET ORAL at 05:14

## 2017-08-10 RX ADMIN — GABAPENTIN 800 MG: 800 TABLET, FILM COATED ORAL at 21:26

## 2017-08-10 RX ADMIN — CALCIUM CARBONATE (ANTACID) CHEW TAB 500 MG 1000 MG: 500 CHEW TAB at 06:33

## 2017-08-10 RX ADMIN — TIZANIDINE 2 MG: 2 TABLET ORAL at 20:43

## 2017-08-10 RX ADMIN — OXYCODONE HYDROCHLORIDE 80 MG: 40 TABLET, FILM COATED, EXTENDED RELEASE ORAL at 08:42

## 2017-08-10 RX ADMIN — DIAZEPAM 5 MG: 5 TABLET ORAL at 16:13

## 2017-08-10 RX ADMIN — BUPROPION HYDROCHLORIDE 300 MG: 300 TABLET, FILM COATED, EXTENDED RELEASE ORAL at 08:41

## 2017-08-10 RX ADMIN — IBUPROFEN 600 MG: 600 TABLET ORAL at 21:26

## 2017-08-10 RX ADMIN — METHYLPHENIDATE HYDROCHLORIDE 20 MG: 10 TABLET ORAL at 10:20

## 2017-08-10 RX ADMIN — TIZANIDINE 2 MG: 2 TABLET ORAL at 08:42

## 2017-08-10 RX ADMIN — METHYLPHENIDATE HYDROCHLORIDE 20 MG: 10 TABLET ORAL at 17:33

## 2017-08-10 RX ADMIN — GABAPENTIN 800 MG: 800 TABLET, FILM COATED ORAL at 08:41

## 2017-08-10 RX ADMIN — OXYCODONE HYDROCHLORIDE 80 MG: 40 TABLET, FILM COATED, EXTENDED RELEASE ORAL at 17:33

## 2017-08-10 RX ADMIN — BENZONATATE 100 MG: 100 CAPSULE ORAL at 08:56

## 2017-08-10 RX ADMIN — DIAZEPAM 5 MG: 5 TABLET ORAL at 08:41

## 2017-08-10 RX ADMIN — IBUPROFEN 600 MG: 600 TABLET ORAL at 17:33

## 2017-08-10 RX ADMIN — HYDROXYZINE HYDROCHLORIDE 50 MG: 25 TABLET ORAL at 08:41

## 2017-08-10 RX ADMIN — HYDROXYZINE HYDROCHLORIDE 50 MG: 25 TABLET ORAL at 16:13

## 2017-08-10 RX ADMIN — THERA TABS 1 TABLET: TAB at 08:42

## 2017-08-10 RX ADMIN — AZITHROMYCIN 250 MG: 250 TABLET, FILM COATED ORAL at 08:42

## 2017-08-10 RX ADMIN — CEFTRIAXONE 1 G: 1 INJECTION, POWDER, FOR SOLUTION INTRAMUSCULAR; INTRAVENOUS at 20:43

## 2017-08-10 RX ADMIN — BUTALBITAL, ACETAMINOPHEN, CAFFEINE, AND CODEINE PHOSPHATE 2 CAPSULE: 30; 50; 40; 325 CAPSULE ORAL at 06:32

## 2017-08-10 RX ADMIN — OXYCODONE HYDROCHLORIDE 5 MG: 5 TABLET ORAL at 16:18

## 2017-08-10 RX ADMIN — OXYCODONE HYDROCHLORIDE 5 MG: 5 TABLET ORAL at 20:02

## 2017-08-10 RX ADMIN — DIAZEPAM 5 MG: 5 TABLET ORAL at 21:26

## 2017-08-10 RX ADMIN — HYDROXYZINE HYDROCHLORIDE 50 MG: 25 TABLET ORAL at 21:26

## 2017-08-10 RX ADMIN — IBUPROFEN 600 MG: 600 TABLET ORAL at 08:41

## 2017-08-10 RX ADMIN — GUAIFENESIN AND DEXTROMETHORPHAN HYDROBROMIDE 1 TABLET: 600; 30 TABLET, EXTENDED RELEASE ORAL at 20:43

## 2017-08-10 RX ADMIN — OMEPRAZOLE 40 MG: 20 CAPSULE, DELAYED RELEASE ORAL at 08:42

## 2017-08-10 RX ADMIN — METHYLPHENIDATE HYDROCHLORIDE 20 MG: 10 TABLET ORAL at 06:33

## 2017-08-10 RX ADMIN — OXYCODONE HYDROCHLORIDE 10 MG: 5 TABLET ORAL at 00:28

## 2017-08-10 RX ADMIN — SERTRALINE HYDROCHLORIDE 150 MG: 100 TABLET ORAL at 08:42

## 2017-08-10 RX ADMIN — OXYCODONE HYDROCHLORIDE 80 MG: 40 TABLET, FILM COATED, EXTENDED RELEASE ORAL at 21:26

## 2017-08-10 RX ADMIN — GABAPENTIN 800 MG: 800 TABLET, FILM COATED ORAL at 16:13

## 2017-08-10 RX ADMIN — TRIAMTERENE AND HYDROCHLOROTHIAZIDE 1 TABLET: 37.5; 25 TABLET ORAL at 08:42

## 2017-08-10 RX ADMIN — GUAIFENESIN AND DEXTROMETHORPHAN HYDROBROMIDE 1 TABLET: 600; 30 TABLET, EXTENDED RELEASE ORAL at 10:19

## 2017-08-10 NOTE — PROGRESS NOTES
Pt on overnight oximetry study with 1 LPM of oxygen, will continue to monitor the pt.    RT Braeden.

## 2017-08-10 NOTE — PROGRESS NOTES
[facility]    Hospitalist Progress Note    Date of Service (when I saw the patient): 08/10/2017    Assessment & Plan   Carlie Myers is a 61-year-old white female with a history of nicotine dependence, neuropathy, psoriatic arthritis, chronic pain syndrome, on multiple narcotics, mitral valve prolapse, who states that she is due for getting an MRI, and saw her primary care doctor on Wednesday, whereby she had a low-grade fever and a cough.     Suspected Sepsis, CAP: Likely due to CAP.   - IV Rocephin and azithromycin.   - Thoracentesis if repeat CXR shows persistent left pleural effusion, though US 8/8 with small left effusion  - Bld cx pending  - Pulmonary hygiene.   - Wean oxygen as able for sats 92%.   - Mucinex BID, prn tessalon      Chronic pain syndrome, back pain: Stable.   - c/w home narcotic regimen.   - Overnight oximetry needing 1lpm conts.     DVT Prophylaxis: Pneumatic Compression Devices  Code Status: Full Code    Disposition: Expected discharge in 1 day.    Charlotte Fu MD    Interval History   No fevers/chills last night. Tolerating diet. Frustrated by delay with surgery due to hospitalization.    -Data reviewed today: I reviewed all new labs and imaging results over the last 24 hours. I personally reviewed no images or EKG's today.    Physical Exam   Temp: 97.7  F (36.5  C) Temp src: Oral BP: 102/69   Heart Rate: 89 Resp: 16 SpO2: 94 % O2 Device: Nasal cannula Oxygen Delivery: 1 LPM  Vitals:    08/10/17 0546   Weight: 86.5 kg (190 lb 11.2 oz)     Vital Signs with Ranges  Temp:  [96  F (35.6  C)-102  F (38.9  C)] 97.7  F (36.5  C)  Heart Rate:  [] 89  Resp:  [16] 16  BP: (102-138)/(50-76) 102/69  SpO2:  [90 %-95 %] 94 %  I/O last 3 completed shifts:  In: -   Out: 1100 [Urine:1100]    Constitutional: Awake, alert, cooperative, no apparent distress  Respiratory: Clear to auscultation bilaterally except coarse breath sounds at bases, no wheezing  Cardiovascular: Regular rate  and rhythm, normal S1 and S2, and no murmur noted  GI: Normal bowel sounds, soft, non-distended, non-tender  Skin/Integumen: No rashes, no cyanosis, no edema    Medications     - MEDICATION INSTRUCTIONS -         dextromethorphan-guaiFENesin  1 tablet Oral BID     cefTRIAXone  1 g Intravenous Q24H     sodium chloride (PF)  3 mL Intracatheter Q8H     diazepam (VALIUM) tablet 5 mg  5 mg Oral TID     gabapentin  800 mg Oral TID     hydrOXYzine  50 mg Oral TID     ibuprofen (ADVIL/MOTRIN) tablet 600 mg  600 mg Oral 4x Daily     methylphenidate  20 mg Oral 4x Daily     multivitamin, therapeutic  1 tablet Oral Daily     omeprazole (priLOSEC) CR capsule 40 mg  40 mg Oral QAM     oxyCODONE (OxyCONTIN) 12 hr tablet 80 mg  80 mg Oral 4x Daily     sertraline (ZOLOFT) tablet 150 mg  150 mg Oral Daily     tiZANidine (ZANAFLEX) tablet 2 mg  2 mg Oral BID     triamterene-hydrochlorothiazide  1 tablet Oral Daily     buPROPion (WELLBUTRIN XL) 24 hr tablet 300 mg  300 mg Oral Daily     azithromycin  250 mg Oral Daily       Data     Recent Labs  Lab 08/09/17  0717 08/08/17  0712 08/07/17  1630 08/07/17  1246 08/07/17  0105   WBC 6.9 9.6  --   --  12.0*   HGB 11.4* 11.6*  --   --  10.3*   MCV 95 95  --   --  93    295  --   --  262   INR  --   --  1.10  --   --     135  --   --  136   POTASSIUM 3.4 3.6  --  3.6 3.0*   CHLORIDE 93* 94  --   --  97   CO2 35* 34*  --   --  31   BUN 8 5*  --   --  11   CR 0.58 0.60  --   --  0.63   ANIONGAP 7 7  --   --  8   BENNY 9.1 9.1  --   --  7.8*   GLC 97 112*  --   --  115*   ALBUMIN 2.4*  --   --   --  2.4*   PROTTOTAL 7.2  --   --  6.4* 6.4*   BILITOTAL 0.2  --   --   --  0.3   ALKPHOS 114  --   --   --  100   ALT 53*  --   --   --  66*   AST 31  --   --   --  21   TROPI <0.015The 99th percentile for upper reference range is 0.045 ug/L.  Troponin values in the range of 0.045 - 0.120 ug/L may be associated with risks of adverse clinical events.  --   --   --  <0.015The 99th percentile  for upper reference range is 0.045 ug/L.  Troponin values in the range of 0.045 - 0.120 ug/L may be associated with risks of adverse clinical events.       No results found for this or any previous visit (from the past 24 hour(s)).

## 2017-08-10 NOTE — PROGRESS NOTES
Overnight pulse Ox was completed, and the study was placed in the patient's chart. patient was on 1LPM all night with spo2 85-94%  8/10/2017  Angelina Macias

## 2017-08-10 NOTE — PLAN OF CARE
Problem: Goal Outcome Summary  Goal: Goal Outcome Summary  Outcome: No Change  2262-6612: No acute events. VSS on 1L O2 via NC. Unable to wean. Scheduled pain medications providing adequate relief. Discharge pending weaning from oxygen and transition to oral antibiotics.

## 2017-08-11 ENCOUNTER — APPOINTMENT (OUTPATIENT)
Dept: ULTRASOUND IMAGING | Facility: CLINIC | Age: 62
DRG: 853 | End: 2017-08-11
Attending: INTERNAL MEDICINE
Payer: MEDICARE

## 2017-08-11 ENCOUNTER — APPOINTMENT (OUTPATIENT)
Dept: GENERAL RADIOLOGY | Facility: CLINIC | Age: 62
DRG: 853 | End: 2017-08-11
Attending: PHYSICIAN ASSISTANT
Payer: MEDICARE

## 2017-08-11 LAB
ANION GAP SERPL CALCULATED.3IONS-SCNC: 7 MMOL/L (ref 3–14)
APPEARANCE FLD: NORMAL
BUN SERPL-MCNC: 12 MG/DL (ref 7–30)
CALCIUM SERPL-MCNC: 9.1 MG/DL (ref 8.5–10.1)
CHLORIDE SERPL-SCNC: 90 MMOL/L (ref 94–109)
CO2 SERPL-SCNC: 32 MMOL/L (ref 20–32)
COLOR FLD: NORMAL
CREAT SERPL-MCNC: 0.62 MG/DL (ref 0.52–1.04)
GFR SERPL CREATININE-BSD FRML MDRD: ABNORMAL ML/MIN/1.7M2
GLUCOSE FLD-MCNC: 64 MG/DL
GLUCOSE SERPL-MCNC: 107 MG/DL (ref 70–99)
GRAM STN SPEC: NORMAL
LDH FLD L TO P-CCNC: 511 U/L
LDH SERPL L TO P-CCNC: 127 U/L (ref 81–234)
MICRO REPORT STATUS: NORMAL
PH FLD: 8.5 PH
POTASSIUM SERPL-SCNC: 3.3 MMOL/L (ref 3.4–5.3)
POTASSIUM SERPL-SCNC: 3.8 MMOL/L (ref 3.4–5.3)
PROT FLD-MCNC: 4.4 G/DL
PROT SERPL-MCNC: 7.5 G/DL (ref 6.8–8.8)
SODIUM SERPL-SCNC: 129 MMOL/L (ref 133–144)
SPECIMEN SOURCE FLD: NORMAL
SPECIMEN SOURCE: NORMAL
TSH SERPL DL<=0.005 MIU/L-ACNC: 0.42 MU/L (ref 0.4–4)
WBC # FLD AUTO: NORMAL /UL

## 2017-08-11 PROCEDURE — 84132 ASSAY OF SERUM POTASSIUM: CPT | Performed by: INTERNAL MEDICINE

## 2017-08-11 PROCEDURE — 0W9B3ZX DRAINAGE OF LEFT PLEURAL CAVITY, PERCUTANEOUS APPROACH, DIAGNOSTIC: ICD-10-PCS | Performed by: PHYSICIAN ASSISTANT

## 2017-08-11 PROCEDURE — 25000132 ZZH RX MED GY IP 250 OP 250 PS 637: Mod: GY | Performed by: INTERNAL MEDICINE

## 2017-08-11 PROCEDURE — A9270 NON-COVERED ITEM OR SERVICE: HCPCS | Mod: GY | Performed by: INTERNAL MEDICINE

## 2017-08-11 PROCEDURE — 25000128 H RX IP 250 OP 636: Performed by: INTERNAL MEDICINE

## 2017-08-11 PROCEDURE — 88305 TISSUE EXAM BY PATHOLOGIST: CPT | Performed by: INTERNAL MEDICINE

## 2017-08-11 PROCEDURE — 83615 LACTATE (LD) (LDH) ENZYME: CPT | Performed by: INTERNAL MEDICINE

## 2017-08-11 PROCEDURE — 87205 SMEAR GRAM STAIN: CPT | Performed by: INTERNAL MEDICINE

## 2017-08-11 PROCEDURE — 87075 CULTR BACTERIA EXCEPT BLOOD: CPT | Performed by: INTERNAL MEDICINE

## 2017-08-11 PROCEDURE — 00000155 ZZHCL STATISTIC H-CELL BLOCK W/STAIN: Performed by: INTERNAL MEDICINE

## 2017-08-11 PROCEDURE — 87070 CULTURE OTHR SPECIMN AEROBIC: CPT | Performed by: INTERNAL MEDICINE

## 2017-08-11 PROCEDURE — 83986 ASSAY PH BODY FLUID NOS: CPT | Performed by: INTERNAL MEDICINE

## 2017-08-11 PROCEDURE — 25000125 ZZHC RX 250: Performed by: INTERNAL MEDICINE

## 2017-08-11 PROCEDURE — 84155 ASSAY OF PROTEIN SERUM: CPT | Performed by: INTERNAL MEDICINE

## 2017-08-11 PROCEDURE — 80048 BASIC METABOLIC PNL TOTAL CA: CPT | Performed by: INTERNAL MEDICINE

## 2017-08-11 PROCEDURE — 36415 COLL VENOUS BLD VENIPUNCTURE: CPT | Performed by: INTERNAL MEDICINE

## 2017-08-11 PROCEDURE — 88112 CYTOPATH CELL ENHANCE TECH: CPT | Mod: 26 | Performed by: INTERNAL MEDICINE

## 2017-08-11 PROCEDURE — 82945 GLUCOSE OTHER FLUID: CPT | Performed by: INTERNAL MEDICINE

## 2017-08-11 PROCEDURE — 40000986 XR CHEST 1 VW

## 2017-08-11 PROCEDURE — 88112 CYTOPATH CELL ENHANCE TECH: CPT | Performed by: INTERNAL MEDICINE

## 2017-08-11 PROCEDURE — 88305 TISSUE EXAM BY PATHOLOGIST: CPT | Mod: 26 | Performed by: INTERNAL MEDICINE

## 2017-08-11 PROCEDURE — 32555 ASPIRATE PLEURA W/ IMAGING: CPT

## 2017-08-11 PROCEDURE — 84443 ASSAY THYROID STIM HORMONE: CPT | Performed by: INTERNAL MEDICINE

## 2017-08-11 PROCEDURE — 84157 ASSAY OF PROTEIN OTHER: CPT | Performed by: INTERNAL MEDICINE

## 2017-08-11 PROCEDURE — 12000000 ZZH R&B MED SURG/OB

## 2017-08-11 PROCEDURE — 99232 SBSQ HOSP IP/OBS MODERATE 35: CPT | Performed by: INTERNAL MEDICINE

## 2017-08-11 PROCEDURE — 40000863 ZZH STATISTIC RADIOLOGY XRAY, US, CT, MAR, NM

## 2017-08-11 RX ORDER — LIDOCAINE HYDROCHLORIDE 10 MG/ML
10 INJECTION, SOLUTION EPIDURAL; INFILTRATION; INTRACAUDAL; PERINEURAL ONCE
Status: COMPLETED | OUTPATIENT
Start: 2017-08-11 | End: 2017-08-11

## 2017-08-11 RX ORDER — SODIUM CHLORIDE 9 MG/ML
INJECTION, SOLUTION INTRAVENOUS CONTINUOUS
Status: ACTIVE | OUTPATIENT
Start: 2017-08-11 | End: 2017-08-11

## 2017-08-11 RX ADMIN — OXYCODONE HYDROCHLORIDE 10 MG: 5 TABLET ORAL at 12:22

## 2017-08-11 RX ADMIN — DIAZEPAM 5 MG: 5 TABLET ORAL at 21:23

## 2017-08-11 RX ADMIN — OXYCODONE HYDROCHLORIDE 10 MG: 5 TABLET ORAL at 15:18

## 2017-08-11 RX ADMIN — OMEPRAZOLE 40 MG: 20 CAPSULE, DELAYED RELEASE ORAL at 08:41

## 2017-08-11 RX ADMIN — AZITHROMYCIN 250 MG: 250 TABLET, FILM COATED ORAL at 08:41

## 2017-08-11 RX ADMIN — BUPROPION HYDROCHLORIDE 300 MG: 300 TABLET, FILM COATED, EXTENDED RELEASE ORAL at 08:41

## 2017-08-11 RX ADMIN — ACETAMINOPHEN 650 MG: 325 TABLET, FILM COATED ORAL at 22:02

## 2017-08-11 RX ADMIN — OXYCODONE HYDROCHLORIDE 10 MG: 5 TABLET ORAL at 08:42

## 2017-08-11 RX ADMIN — LIDOCAINE HYDROCHLORIDE 100 MG: 10 INJECTION, SOLUTION EPIDURAL; INFILTRATION; INTRACAUDAL; PERINEURAL at 14:14

## 2017-08-11 RX ADMIN — SODIUM CHLORIDE: 9 INJECTION, SOLUTION INTRAVENOUS at 08:40

## 2017-08-11 RX ADMIN — THERA TABS 1 TABLET: TAB at 08:41

## 2017-08-11 RX ADMIN — TIZANIDINE 2 MG: 2 TABLET ORAL at 21:22

## 2017-08-11 RX ADMIN — BUTALBITAL, ACETAMINOPHEN, CAFFEINE, AND CODEINE PHOSPHATE 2 CAPSULE: 30; 50; 40; 325 CAPSULE ORAL at 05:04

## 2017-08-11 RX ADMIN — TRIAMTERENE AND HYDROCHLOROTHIAZIDE 1 TABLET: 37.5; 25 TABLET ORAL at 08:41

## 2017-08-11 RX ADMIN — OXYCODONE HYDROCHLORIDE 10 MG: 5 TABLET ORAL at 05:02

## 2017-08-11 RX ADMIN — BENZONATATE 100 MG: 100 CAPSULE ORAL at 18:08

## 2017-08-11 RX ADMIN — OXYCODONE HYDROCHLORIDE 80 MG: 40 TABLET, FILM COATED, EXTENDED RELEASE ORAL at 08:40

## 2017-08-11 RX ADMIN — DEXTRAN 70, AND HYPROMELLOSE 2910 3 DROP: 1; 3 SOLUTION/ DROPS OPHTHALMIC at 08:11

## 2017-08-11 RX ADMIN — OXYCODONE HYDROCHLORIDE 80 MG: 40 TABLET, FILM COATED, EXTENDED RELEASE ORAL at 21:23

## 2017-08-11 RX ADMIN — METHYLPHENIDATE HYDROCHLORIDE 20 MG: 10 TABLET ORAL at 18:08

## 2017-08-11 RX ADMIN — POTASSIUM CHLORIDE 20 MEQ: 1500 TABLET, EXTENDED RELEASE ORAL at 08:41

## 2017-08-11 RX ADMIN — OXYCODONE HYDROCHLORIDE 80 MG: 40 TABLET, FILM COATED, EXTENDED RELEASE ORAL at 12:22

## 2017-08-11 RX ADMIN — GUAIFENESIN AND DEXTROMETHORPHAN HYDROBROMIDE 1 TABLET: 600; 30 TABLET, EXTENDED RELEASE ORAL at 21:23

## 2017-08-11 RX ADMIN — DIAZEPAM 5 MG: 5 TABLET ORAL at 08:41

## 2017-08-11 RX ADMIN — IBUPROFEN 600 MG: 600 TABLET ORAL at 21:23

## 2017-08-11 RX ADMIN — GUAIFENESIN AND DEXTROMETHORPHAN HYDROBROMIDE 1 TABLET: 600; 30 TABLET, EXTENDED RELEASE ORAL at 08:41

## 2017-08-11 RX ADMIN — TIZANIDINE 2 MG: 2 TABLET ORAL at 08:40

## 2017-08-11 RX ADMIN — METHYLPHENIDATE HYDROCHLORIDE 20 MG: 10 TABLET ORAL at 06:24

## 2017-08-11 RX ADMIN — GABAPENTIN 800 MG: 800 TABLET, FILM COATED ORAL at 16:41

## 2017-08-11 RX ADMIN — HYDROXYZINE HYDROCHLORIDE 50 MG: 25 TABLET ORAL at 08:42

## 2017-08-11 RX ADMIN — DIAZEPAM 5 MG: 5 TABLET ORAL at 16:41

## 2017-08-11 RX ADMIN — DEXTRAN 70, AND HYPROMELLOSE 2910 3 DROP: 1; 3 SOLUTION/ DROPS OPHTHALMIC at 10:01

## 2017-08-11 RX ADMIN — OXYCODONE HYDROCHLORIDE 10 MG: 5 TABLET ORAL at 00:35

## 2017-08-11 RX ADMIN — METHYLPHENIDATE HYDROCHLORIDE 20 MG: 10 TABLET ORAL at 10:01

## 2017-08-11 RX ADMIN — GABAPENTIN 800 MG: 800 TABLET, FILM COATED ORAL at 21:22

## 2017-08-11 RX ADMIN — OXYCODONE HYDROCHLORIDE 80 MG: 40 TABLET, FILM COATED, EXTENDED RELEASE ORAL at 18:07

## 2017-08-11 RX ADMIN — GABAPENTIN 800 MG: 800 TABLET, FILM COATED ORAL at 08:41

## 2017-08-11 RX ADMIN — IBUPROFEN 600 MG: 600 TABLET ORAL at 08:41

## 2017-08-11 RX ADMIN — IBUPROFEN 600 MG: 600 TABLET ORAL at 18:08

## 2017-08-11 RX ADMIN — CEFTRIAXONE 1 G: 1 INJECTION, POWDER, FOR SOLUTION INTRAMUSCULAR; INTRAVENOUS at 22:59

## 2017-08-11 RX ADMIN — HYDROXYZINE HYDROCHLORIDE 50 MG: 25 TABLET ORAL at 21:22

## 2017-08-11 RX ADMIN — HYDROXYZINE HYDROCHLORIDE 50 MG: 25 TABLET ORAL at 16:41

## 2017-08-11 RX ADMIN — SERTRALINE HYDROCHLORIDE 150 MG: 100 TABLET ORAL at 08:40

## 2017-08-11 RX ADMIN — IBUPROFEN 600 MG: 600 TABLET ORAL at 12:22

## 2017-08-11 RX ADMIN — POTASSIUM CHLORIDE 40 MEQ: 1500 TABLET, EXTENDED RELEASE ORAL at 10:57

## 2017-08-11 ASSESSMENT — PAIN DESCRIPTION - DESCRIPTORS
DESCRIPTORS: THROBBING
DESCRIPTORS: THROBBING

## 2017-08-11 NOTE — PROGRESS NOTES
[facility]    Hospitalist Progress Note    Date of Service (when I saw the patient): 08/11/2017    Assessment & Plan   Carlie Myers is a 61-year-old white female with a history of nicotine dependence, neuropathy, psoriatic arthritis, chronic pain syndrome, on multiple narcotics, mitral valve prolapse, who states that she is due for getting an MRI, and saw her primary care doctor on Wednesday, whereby she had a low-grade fever and a cough.      Suspected Sepsis, CAP: Likely due to CAP. Patient with chronic back pain limiting inspiratory effort and cough mechanism. US 8/8 with small left effusion  - low grade Temp 100.1, persistent hypoxia in setting of diminished cough/inspiratory effort from chronic back pain  - c/w IV Rocephin and azithromycin, may need to escalate abx tx if continues to have fevers  - CXR 8/10 persistent left effusion, thoracentesis ordered today --> if unable to perform will order CT chest  - bld cx pending  - Pulmonary hygiene.   - Wean oxygen as able for sats 92%.   - Mucinex BID, prn tessalon    Hypochloremic hyponatremia:  Na normal on admission, down to 131 on 8/10. Patient with reduced oral intake due to lack of appetite.  - Na 129 today  - NS 125ml/hr x 8 hours   - recheck BMP in AM, renal function at baseline      Chronic pain syndrome, back pain: Stable.   - c/w home narcotic regimen.   - Overnight oximetry needing 1lpm conts.     Hypokalemia:  - K replacement protocol    DVT Prophylaxis: Pneumatic Compression Devices  Code Status: Full Code    Disposition: Expected discharge in 1-2 days once fever resolves, clinically feeling better/improved.    Charlotte Fu MD    Interval History   No new complaints. Continues to feel weaker than usual, poor cough reflex due to back pain. Son at bedside.    -Data reviewed today: I reviewed all new labs and imaging results over the last 24 hours. I personally reviewed the chest x-ray image(s) showing left sided small pleural  effusion.    Physical Exam   Temp: 98.6  F (37  C) Temp src: Oral BP: 135/54   Heart Rate: 90 Resp: 16 SpO2: 96 % O2 Device: Nasal cannula Oxygen Delivery: 2 LPM  Vitals:    08/10/17 0546   Weight: 86.5 kg (190 lb 11.2 oz)     Vital Signs with Ranges  Temp:  [96  F (35.6  C)-100.1  F (37.8  C)] 98.6  F (37  C)  Heart Rate:  [] 90  Resp:  [14-18] 16  BP: (107-137)/(49-70) 135/54  SpO2:  [88 %-96 %] 96 %  I/O last 3 completed shifts:  In: -   Out: 1200 [Urine:1200]    Constitutional: Awake, alert, cooperative, no apparent distress  Respiratory: Clear to auscultation bilaterally except reduced breath sounds left lung base. No crackles or wheezing  Cardiovascular: Regular rate and rhythm, normal S1 and S2, and no murmur noted  GI: Normal bowel sounds, soft, non-distended, non-tender  Skin/Integumen: No rashes, no cyanosis, no edema    Medications     - MEDICATION INSTRUCTIONS -         dextromethorphan-guaiFENesin  1 tablet Oral BID     cefTRIAXone  1 g Intravenous Q24H     sodium chloride (PF)  3 mL Intracatheter Q8H     diazepam (VALIUM) tablet 5 mg  5 mg Oral TID     gabapentin  800 mg Oral TID     hydrOXYzine  50 mg Oral TID     ibuprofen (ADVIL/MOTRIN) tablet 600 mg  600 mg Oral 4x Daily     methylphenidate  20 mg Oral 4x Daily     multivitamin, therapeutic  1 tablet Oral Daily     omeprazole (priLOSEC) CR capsule 40 mg  40 mg Oral QAM     oxyCODONE (OxyCONTIN) 12 hr tablet 80 mg  80 mg Oral 4x Daily     sertraline (ZOLOFT) tablet 150 mg  150 mg Oral Daily     tiZANidine (ZANAFLEX) tablet 2 mg  2 mg Oral BID     triamterene-hydrochlorothiazide  1 tablet Oral Daily     buPROPion (WELLBUTRIN XL) 24 hr tablet 300 mg  300 mg Oral Daily     azithromycin  250 mg Oral Daily       Data     Recent Labs  Lab 08/11/17  0659 08/10/17  0708 08/09/17  0717 08/08/17  0712 08/07/17  1630 08/07/17  1246 08/07/17  0105   WBC  --  5.3 6.9 9.6  --   --  12.0*   HGB  --  10.9* 11.4* 11.6*  --   --  10.3*   MCV  --  94 95 95  --    --  93   PLT  --  268 293 295  --   --  262   INR  --   --   --   --  1.10  --   --    * 131* 135 135  --   --  136   POTASSIUM 3.3* 3.5 3.4 3.6  --  3.6 3.0*   CHLORIDE 90* 90* 93* 94  --   --  97   CO2 32 33* 35* 34*  --   --  31   BUN 12 11 8 5*  --   --  11   CR 0.62 0.66 0.58 0.60  --   --  0.63   ANIONGAP 7 8 7 7  --   --  8   BENNY 9.1 9.0 9.1 9.1  --   --  7.8*   * 113* 97 112*  --   --  115*   ALBUMIN  --   --  2.4*  --   --   --  2.4*   PROTTOTAL  --   --  7.2  --   --  6.4* 6.4*   BILITOTAL  --   --  0.2  --   --   --  0.3   ALKPHOS  --   --  114  --   --   --  100   ALT  --   --  53*  --   --   --  66*   AST  --   --  31  --   --   --  21   TROPI  --   --  <0.015The 99th percentile for upper reference range is 0.045 ug/L.  Troponin values in the range of 0.045 - 0.120 ug/L may be associated with risks of adverse clinical events.  --   --   --  <0.015The 99th percentile for upper reference range is 0.045 ug/L.  Troponin values in the range of 0.045 - 0.120 ug/L may be associated with risks of adverse clinical events.       Recent Results (from the past 24 hour(s))   XR Chest Port 1 View    Narrative    CHEST PORTABLE ONE VIEW August 10, 2017 8:10 AM     HISTORY: Interval.    COMPARISON: 8/7/2017.      Impression    IMPRESSION: Abnormal airspace consolidation in the left lower lobe  appears to be associated with a small effusion. This is not  significantly changed compared to the prior study. Right lung is  clear. Heart size obscured.    IRIS HARRIS MD

## 2017-08-11 NOTE — PLAN OF CARE
Problem: Goal Outcome Summary  Goal: Goal Outcome Summary  Outcome: No Change  A/Ox4. VSS. Tmax 100.1 this shift, diaphoretic this shift. On 1L O2 NC, stats in mid 90's, crackles present in LLL. C/o L sided rib pain and mid back pain, PRN oxycodone give x2 (5mg tablet), provided slight relief. PIV fell out this evening, new R hand PIV inserted. Discharge pending weaning from O2 and transitioning to oral antibiotics.

## 2017-08-11 NOTE — PLAN OF CARE
Problem: Goal Outcome Summary  Goal: Goal Outcome Summary  Patient A&O x4, pleasant. Does have garbled speech and mumbles at time which is baseline. Currently on 1L NC for sats 92%, other VSS.  Pt complained of 8/10 Right rib/back pain PRN oxycodone given 10mg x2 (0845, 1200,) . Crackles in bases. Tele-NSR. PIV in right hand, SL.  Pt ambulates SBA with walker.  Thoracentesis today, only 20CC removed.  Possible discharge tomorrow, try to wean off 02 today and get patient to ambulate in halls. Will continue to monitor.

## 2017-08-11 NOTE — PLAN OF CARE
Problem: Goal Outcome Summary  Goal: Goal Outcome Summary  Outcome: No Change  Patient A&O x4, pleasant. Does have garbled speech and mumbles at time which is baseline. Did desat on shift to high 80's on 1 liter. Increased to 2 liters, sats above and equal to 92%. PRN oxycodone given x2 (10 mg tablet) for Left sided back pain, provided some relief. Also given Fioricet on shift for c/o H/A. Crackles in bases. Tele-NSR. PIV in right hand, SL. SBA and walker to bathroom on shift.

## 2017-08-11 NOTE — PROGRESS NOTES
RADIOLOGY PROCEDURE NOTE  Patient name: Carlie Myers  MRN: [id number]  : 1955    Pre-procedure diagnosis: Pleural effusion  Post-procedure diagnosis: Same    Procedure Date/Time: 2017  2:09 PM  Procedure: Left thoracentesis  Estimated blood loss: None  Specimen(s) collected with description: pleural effusion  The patient tolerated the procedure well with no immediate complications.  Significant findings:none    See imaging dictation for procedural details.    Provider name: Deny Manzo  Assistant(s):None

## 2017-08-12 ENCOUNTER — APPOINTMENT (OUTPATIENT)
Dept: ULTRASOUND IMAGING | Facility: CLINIC | Age: 62
DRG: 853 | End: 2017-08-12
Attending: INTERNAL MEDICINE
Payer: MEDICARE

## 2017-08-12 ENCOUNTER — APPOINTMENT (OUTPATIENT)
Dept: CT IMAGING | Facility: CLINIC | Age: 62
DRG: 853 | End: 2017-08-12
Attending: INTERNAL MEDICINE
Payer: MEDICARE

## 2017-08-12 LAB
ANION GAP SERPL CALCULATED.3IONS-SCNC: 11 MMOL/L (ref 3–14)
BUN SERPL-MCNC: 12 MG/DL (ref 7–30)
CALCIUM SERPL-MCNC: 8.9 MG/DL (ref 8.5–10.1)
CHLORIDE SERPL-SCNC: 94 MMOL/L (ref 94–109)
CO2 SERPL-SCNC: 27 MMOL/L (ref 20–32)
CREAT SERPL-MCNC: 0.67 MG/DL (ref 0.52–1.04)
GFR SERPL CREATININE-BSD FRML MDRD: 90 ML/MIN/1.7M2
GLUCOSE SERPL-MCNC: 86 MG/DL (ref 70–99)
L PNEUMO1 AG UR QL IA: NORMAL
LACTATE BLD-SCNC: 0.7 MMOL/L (ref 0.7–2.1)
MICRO REPORT STATUS: NORMAL
MICRO REPORT STATUS: NORMAL
POTASSIUM SERPL-SCNC: 4.7 MMOL/L (ref 3.4–5.3)
S PNEUM AG SPEC QL: NORMAL
SODIUM SERPL-SCNC: 132 MMOL/L (ref 133–144)
SPECIMEN SOURCE: NORMAL
SPECIMEN SOURCE: NORMAL

## 2017-08-12 PROCEDURE — 25000132 ZZH RX MED GY IP 250 OP 250 PS 637: Mod: GY | Performed by: INTERNAL MEDICINE

## 2017-08-12 PROCEDURE — 25000128 H RX IP 250 OP 636: Performed by: INTERNAL MEDICINE

## 2017-08-12 PROCEDURE — 87899 AGENT NOS ASSAY W/OPTIC: CPT | Performed by: INTERNAL MEDICINE

## 2017-08-12 PROCEDURE — 71260 CT THORAX DX C+: CPT

## 2017-08-12 PROCEDURE — A9270 NON-COVERED ITEM OR SERVICE: HCPCS | Mod: GY | Performed by: INTERNAL MEDICINE

## 2017-08-12 PROCEDURE — 25000125 ZZHC RX 250: Performed by: INTERNAL MEDICINE

## 2017-08-12 PROCEDURE — 12000000 ZZH R&B MED SURG/OB

## 2017-08-12 PROCEDURE — 87040 BLOOD CULTURE FOR BACTERIA: CPT | Performed by: INTERNAL MEDICINE

## 2017-08-12 PROCEDURE — 83605 ASSAY OF LACTIC ACID: CPT | Performed by: INTERNAL MEDICINE

## 2017-08-12 PROCEDURE — 99232 SBSQ HOSP IP/OBS MODERATE 35: CPT | Performed by: INTERNAL MEDICINE

## 2017-08-12 PROCEDURE — 40000257 ZZH STATISTIC CONSULT NO CHARGE VASC ACCESS

## 2017-08-12 PROCEDURE — 36416 COLLJ CAPILLARY BLOOD SPEC: CPT | Performed by: INTERNAL MEDICINE

## 2017-08-12 PROCEDURE — 36415 COLL VENOUS BLD VENIPUNCTURE: CPT | Performed by: INTERNAL MEDICINE

## 2017-08-12 PROCEDURE — 40000556 ZZH STATISTIC PERIPHERAL IV START W US GUIDANCE

## 2017-08-12 PROCEDURE — 76705 ECHO EXAM OF ABDOMEN: CPT

## 2017-08-12 PROCEDURE — 80048 BASIC METABOLIC PNL TOTAL CA: CPT | Performed by: INTERNAL MEDICINE

## 2017-08-12 RX ORDER — PIPERACILLIN SODIUM, TAZOBACTAM SODIUM 3; .375 G/15ML; G/15ML
3.38 INJECTION, POWDER, LYOPHILIZED, FOR SOLUTION INTRAVENOUS EVERY 6 HOURS
Status: DISCONTINUED | OUTPATIENT
Start: 2017-08-12 | End: 2017-08-19 | Stop reason: HOSPADM

## 2017-08-12 RX ORDER — LORAZEPAM 0.5 MG/1
0.5 TABLET ORAL
Status: COMPLETED | OUTPATIENT
Start: 2017-08-12 | End: 2017-08-12

## 2017-08-12 RX ORDER — SODIUM CHLORIDE 9 MG/ML
INJECTION, SOLUTION INTRAVENOUS CONTINUOUS
Status: ACTIVE | OUTPATIENT
Start: 2017-08-12 | End: 2017-08-12

## 2017-08-12 RX ORDER — IOPAMIDOL 755 MG/ML
75 INJECTION, SOLUTION INTRAVASCULAR ONCE
Status: COMPLETED | OUTPATIENT
Start: 2017-08-12 | End: 2017-08-12

## 2017-08-12 RX ADMIN — IBUPROFEN 600 MG: 600 TABLET ORAL at 13:04

## 2017-08-12 RX ADMIN — THERA TABS 1 TABLET: TAB at 08:21

## 2017-08-12 RX ADMIN — METHYLPHENIDATE HYDROCHLORIDE 20 MG: 10 TABLET ORAL at 14:19

## 2017-08-12 RX ADMIN — DIAZEPAM 5 MG: 5 TABLET ORAL at 08:21

## 2017-08-12 RX ADMIN — OXYCODONE HYDROCHLORIDE 80 MG: 40 TABLET, FILM COATED, EXTENDED RELEASE ORAL at 08:20

## 2017-08-12 RX ADMIN — ZOLPIDEM TARTRATE 10 MG: 5 TABLET, FILM COATED ORAL at 21:58

## 2017-08-12 RX ADMIN — LORAZEPAM 0.5 MG: 0.5 TABLET ORAL at 10:29

## 2017-08-12 RX ADMIN — PIPERACILLIN SODIUM,TAZOBACTAM SODIUM 3.38 G: 3; .375 INJECTION, POWDER, FOR SOLUTION INTRAVENOUS at 08:25

## 2017-08-12 RX ADMIN — PIPERACILLIN SODIUM,TAZOBACTAM SODIUM 3.38 G: 3; .375 INJECTION, POWDER, FOR SOLUTION INTRAVENOUS at 21:57

## 2017-08-12 RX ADMIN — GUAIFENESIN AND DEXTROMETHORPHAN HYDROBROMIDE 1 TABLET: 600; 30 TABLET, EXTENDED RELEASE ORAL at 21:58

## 2017-08-12 RX ADMIN — METHYLPHENIDATE HYDROCHLORIDE 20 MG: 10 TABLET ORAL at 10:29

## 2017-08-12 RX ADMIN — OMEPRAZOLE 40 MG: 20 CAPSULE, DELAYED RELEASE ORAL at 08:20

## 2017-08-12 RX ADMIN — HYDROXYZINE HYDROCHLORIDE 50 MG: 25 TABLET ORAL at 16:20

## 2017-08-12 RX ADMIN — SERTRALINE HYDROCHLORIDE 150 MG: 100 TABLET ORAL at 08:21

## 2017-08-12 RX ADMIN — DIAZEPAM 5 MG: 5 TABLET ORAL at 16:20

## 2017-08-12 RX ADMIN — DIAZEPAM 5 MG: 5 TABLET ORAL at 21:58

## 2017-08-12 RX ADMIN — OXYCODONE HYDROCHLORIDE 10 MG: 5 TABLET ORAL at 07:05

## 2017-08-12 RX ADMIN — TIZANIDINE 2 MG: 2 TABLET ORAL at 21:58

## 2017-08-12 RX ADMIN — METHYLPHENIDATE HYDROCHLORIDE 20 MG: 10 TABLET ORAL at 07:02

## 2017-08-12 RX ADMIN — GABAPENTIN 800 MG: 800 TABLET, FILM COATED ORAL at 21:58

## 2017-08-12 RX ADMIN — GUAIFENESIN AND DEXTROMETHORPHAN HYDROBROMIDE 1 TABLET: 600; 30 TABLET, EXTENDED RELEASE ORAL at 08:21

## 2017-08-12 RX ADMIN — HYDROXYZINE HYDROCHLORIDE 50 MG: 25 TABLET ORAL at 08:20

## 2017-08-12 RX ADMIN — OXYCODONE HYDROCHLORIDE 80 MG: 40 TABLET, FILM COATED, EXTENDED RELEASE ORAL at 13:04

## 2017-08-12 RX ADMIN — IBUPROFEN 600 MG: 600 TABLET ORAL at 18:03

## 2017-08-12 RX ADMIN — IBUPROFEN 600 MG: 600 TABLET ORAL at 21:58

## 2017-08-12 RX ADMIN — PIPERACILLIN SODIUM,TAZOBACTAM SODIUM 3.38 G: 3; .375 INJECTION, POWDER, FOR SOLUTION INTRAVENOUS at 14:19

## 2017-08-12 RX ADMIN — OXYCODONE HYDROCHLORIDE 80 MG: 40 TABLET, FILM COATED, EXTENDED RELEASE ORAL at 21:58

## 2017-08-12 RX ADMIN — TRIAMTERENE AND HYDROCHLOROTHIAZIDE 1 TABLET: 37.5; 25 TABLET ORAL at 08:21

## 2017-08-12 RX ADMIN — HYDROXYZINE HYDROCHLORIDE 50 MG: 25 TABLET ORAL at 21:58

## 2017-08-12 RX ADMIN — OXYCODONE HYDROCHLORIDE 10 MG: 5 TABLET ORAL at 00:36

## 2017-08-12 RX ADMIN — IBUPROFEN 600 MG: 600 TABLET ORAL at 08:21

## 2017-08-12 RX ADMIN — HYDROMORPHONE HYDROCHLORIDE 1 MG: 1 INJECTION, SOLUTION INTRAMUSCULAR; INTRAVENOUS; SUBCUTANEOUS at 10:29

## 2017-08-12 RX ADMIN — TIZANIDINE 2 MG: 2 TABLET ORAL at 08:21

## 2017-08-12 RX ADMIN — METHYLPHENIDATE HYDROCHLORIDE 20 MG: 10 TABLET ORAL at 18:03

## 2017-08-12 RX ADMIN — HYDROMORPHONE HYDROCHLORIDE 1 MG: 1 INJECTION, SOLUTION INTRAMUSCULAR; INTRAVENOUS; SUBCUTANEOUS at 11:24

## 2017-08-12 RX ADMIN — OXYCODONE HYDROCHLORIDE 80 MG: 40 TABLET, FILM COATED, EXTENDED RELEASE ORAL at 18:03

## 2017-08-12 RX ADMIN — BENZONATATE 100 MG: 100 CAPSULE ORAL at 21:58

## 2017-08-12 RX ADMIN — GABAPENTIN 800 MG: 800 TABLET, FILM COATED ORAL at 16:20

## 2017-08-12 RX ADMIN — IOPAMIDOL 75 ML: 755 INJECTION, SOLUTION INTRAVENOUS at 11:33

## 2017-08-12 RX ADMIN — BUTALBITAL, ACETAMINOPHEN, CAFFEINE, AND CODEINE PHOSPHATE 2 CAPSULE: 30; 50; 40; 325 CAPSULE ORAL at 21:58

## 2017-08-12 RX ADMIN — OXYCODONE HYDROCHLORIDE 10 MG: 5 TABLET ORAL at 19:46

## 2017-08-12 RX ADMIN — SODIUM CHLORIDE, PRESERVATIVE FREE: 5 INJECTION INTRAVENOUS at 08:24

## 2017-08-12 RX ADMIN — BUPROPION HYDROCHLORIDE 300 MG: 300 TABLET, FILM COATED, EXTENDED RELEASE ORAL at 08:20

## 2017-08-12 RX ADMIN — SODIUM CHLORIDE, PRESERVATIVE FREE 80 ML: 5 INJECTION INTRAVENOUS at 11:34

## 2017-08-12 RX ADMIN — GABAPENTIN 800 MG: 800 TABLET, FILM COATED ORAL at 08:21

## 2017-08-12 ASSESSMENT — PAIN DESCRIPTION - DESCRIPTORS
DESCRIPTORS: ACHING
DESCRIPTORS: THROBBING

## 2017-08-12 NOTE — CONSULTS
ID consult dictated IMP 1 62 yo female presumed pneumonia, pleural fluid, tap suggests exudate , likely parapneumonic infection    REc Ct, Thoracic surg eval and zosyn

## 2017-08-12 NOTE — PROGRESS NOTES
[facility]    Hospitalist Progress Note    Date of Service (when I saw the patient): 08/12/2017    Assessment & Plan   Carlie Myers is a 61-year-old white female with a history of nicotine dependence, neuropathy, psoriatic arthritis, chronic pain syndrome, on multiple narcotics, mitral valve prolapse, who states that she is due for getting an MRI, and saw her primary care doctor on Wednesday, whereby she had a low-grade fever and a cough.      Suspected Sepsis, CAP: Likely due to CAP. Diminished cough/inspiratory effort from chronic back pain. UA on admission negative. US 8/8 with small left effusion.  - Thoracentesis 8/11 with 20ml removed, cx pending, appears exudative  - CT chest ordered along with prn ativan x 1  - Temp 101.2 last night   - d/c IV Rocephin and azithromycin, start Zosyn  - RUQ US ordered  - ID consult requested  - bld cx 8/9 and 8/7 NGTD, re-ordered today  - dry cough, if sputum send for gram stain and cx  - strep pneumo and legionella urine Ag ordered  - Pulmonary hygiene.   - Wean oxygen as able for sats 92%.   - Mucinex BID, prn tessalon    Addendum: CT with moderate left loculated effusion. RUQ US neg. Thoracic surgery consult placed. Appreciate ID recommendations. Discussed findings with patient and daughter at bedside.     Hypochloremic hyponatremia:  Na normal on admission, down to 131 on 8/10. Patient with reduced oral intake due to lack of appetite.   - Na 132 today   - NS 125ml/hr x 8 hours re-ordered today   - recheck BMP in AM      Chronic pain syndrome, back pain: Stable.   - Overnight oximetry needing 1lpm conts, consider OP sleep study  - c/w home narcotic regimen.      Hypokalemia:  - K replacement protocol    DVT Prophylaxis: Pneumatic Compression Devices  Code Status: Full Code    Disposition: Expected discharge in 2 days once fevers resolved and tolerating adequate po intake.    Charlotte Fu MD    Interval History   Fever last night. Continues to have  dry cough and back pain unchanged. Upset that nursing aid told her to stand up straight on more than one occasion. Trying to consume food/fluids but appetite remains poor.    -Data reviewed today: I reviewed all new labs and imaging results over the last 24 hours. I personally reviewed the chest x-ray image(s) showing slight improvement in left effusion, possibly atelectasis.    Physical Exam   Temp: 99.7  F (37.6  C) Temp src: Oral BP: 124/45   Heart Rate: 89 Resp: 18 SpO2: 93 % O2 Device: Nasal cannula Oxygen Delivery: 1 LPM  Vitals:    08/10/17 0546   Weight: 86.5 kg (190 lb 11.2 oz)     Vital Signs with Ranges  Temp:  [96.2  F (35.7  C)-101.2  F (38.4  C)] 99.7  F (37.6  C)  Heart Rate:  [79-91] 89  Resp:  [16-18] 18  BP: (120-136)/(45-71) 124/45  SpO2:  [93 %-97 %] 93 %  I/O last 3 completed shifts:  In: -   Out: 1100 [Urine:1100]    Constitutional: Awake, alert, cooperative, coughing spells, otherwise no acute distress  Respiratory: Reduced breath sounds left lung base, no wheezing/crackles.  Cardiovascular: Regular rate and rhythm, normal S1 and S2, and no murmur noted  GI: Normal bowel sounds, soft, non-distended, vague tenderness to deep palpation in RUQ, negative mohamud's, no rebound/guarding.  Skin/Integumen: No rashes, no cyanosis, no edema    Medications     NaCl       - MEDICATION INSTRUCTIONS -         piperacillin-tazobactam  3.375 g Intravenous Q6H     dextromethorphan-guaiFENesin  1 tablet Oral BID     sodium chloride (PF)  3 mL Intracatheter Q8H     diazepam (VALIUM) tablet 5 mg  5 mg Oral TID     gabapentin  800 mg Oral TID     hydrOXYzine  50 mg Oral TID     ibuprofen (ADVIL/MOTRIN) tablet 600 mg  600 mg Oral 4x Daily     methylphenidate  20 mg Oral 4x Daily     multivitamin, therapeutic  1 tablet Oral Daily     omeprazole (priLOSEC) CR capsule 40 mg  40 mg Oral QAM     oxyCODONE (OxyCONTIN) 12 hr tablet 80 mg  80 mg Oral 4x Daily     sertraline (ZOLOFT) tablet 150 mg  150 mg Oral Daily      tiZANidine (ZANAFLEX) tablet 2 mg  2 mg Oral BID     triamterene-hydrochlorothiazide  1 tablet Oral Daily     buPROPion (WELLBUTRIN XL) 24 hr tablet 300 mg  300 mg Oral Daily       Data     Recent Labs  Lab 08/12/17  0635 08/11/17  1545 08/11/17  0659 08/10/17  0708 08/09/17  0717 08/08/17  0712 08/07/17  1630  08/07/17  0105   WBC  --   --   --  5.3 6.9 9.6  --   --  12.0*   HGB  --   --   --  10.9* 11.4* 11.6*  --   --  10.3*   MCV  --   --   --  94 95 95  --   --  93   PLT  --   --   --  268 293 295  --   --  262   INR  --   --   --   --   --   --  1.10  --   --    *  --  129* 131* 135 135  --   --  136   POTASSIUM 4.7 3.8 3.3* 3.5 3.4 3.6  --   < > 3.0*   CHLORIDE 94  --  90* 90* 93* 94  --   --  97   CO2 27  --  32 33* 35* 34*  --   --  31   BUN 12  --  12 11 8 5*  --   --  11   CR 0.67  --  0.62 0.66 0.58 0.60  --   --  0.63   ANIONGAP 11  --  7 8 7 7  --   --  8   BENNY 8.9  --  9.1 9.0 9.1 9.1  --   --  7.8*   GLC 86  --  107* 113* 97 112*  --   --  115*   ALBUMIN  --   --   --   --  2.4*  --   --   --  2.4*   PROTTOTAL  --   --  7.5  --  7.2  --   --   < > 6.4*   BILITOTAL  --   --   --   --  0.2  --   --   --  0.3   ALKPHOS  --   --   --   --  114  --   --   --  100   ALT  --   --   --   --  53*  --   --   --  66*   AST  --   --   --   --  31  --   --   --  21   TROPI  --   --   --   --  <0.015The 99th percentile for upper reference range is 0.045 ug/L.  Troponin values in the range of 0.045 - 0.120 ug/L may be associated with risks of adverse clinical events.  --   --   --  <0.015The 99th percentile for upper reference range is 0.045 ug/L.  Troponin values in the range of 0.045 - 0.120 ug/L may be associated with risks of adverse clinical events.   < > = values in this interval not displayed.    Recent Results (from the past 24 hour(s))   XR Chest 1 View    Narrative    XR CHEST 1 VW 8/11/2017 2:23 PM    HISTORY: Thoracentesis.    COMPARISON: August 10, 2017.      Impression    IMPRESSION: Slight  interval decrease in left pleural effusion. Left  basilar atelectasis persists. The right lung remains clear. No  pneumothorax.    SHARDA JOYCE MD   US Thoracentesis    Narrative    EXAM: US THORACENTESIS       8/11/2017 2:26 PM       HISTORY: Pleural effusion. Request for diagnostic thoracentesis.      PROCEDURE:  Written informed consent was obtained from the patient  prior to the procedure. The risks and benefits including bleeding,  infection and pneumothorax were discussed and the patient wished to  continue. Initial ultrasound images demonstrated a left pleural fluid  collection. The skin overlying this collection was marked, prepped,  draped and anesthetized in usual sterile fashion utilizing 5 mL of  lidocaine 1%. Thoracentesis catheter was then placed into the pleural  fluid collection with return of 20 mL of dark gill-colored fluid.  Patient tolerated the procedure well. Followup chest x-ray was  ordered.      US guidance was utilized to enter the left pleural space for  thoracentesis with permanent image recoding.      Impression    IMPRESSION:  Ultrasound-guided left thoracentesis.     JOSE URBANO PA-C

## 2017-08-12 NOTE — PLAN OF CARE
Problem: Goal Outcome Summary  Goal: Goal Outcome Summary  Outcome: No Change  Patient A&O x4. On 1-2LPM sating in the mid 90's. Scheduled Oxy and Ibuprofen given. Tmax 99.9 ax, diaphoretic at times. Encouraged patient to do IS.   Crackles in bases. Non-productive congested cough, need sputum. On tele, NSR.  Tolerating regular diet, fair appetite.  Plan for thoracic surgery to see.  `

## 2017-08-12 NOTE — PLAN OF CARE
Problem: Goal Outcome Summary  Goal: Goal Outcome Summary  A/o x4 at start of shift. Confused after 2200 hour. Did not know what hospital she was in. Reassessed pt orientated now, but forgetful. Chronic pain. VSS 1LPM mid 90's slight temp 101.2, did IS and tylenol. Recheck 98.9. New IV placed by flying squad. Regular diet minimal appetite tonight. Walked in [place] x1. Cough non productive. Tearful tonight, listened and encouraged reflection on how to improve condition like walking and IS.  Pending D/c

## 2017-08-12 NOTE — PLAN OF CARE
Problem: Goal Outcome Summary  Goal: Goal Outcome Summary  Outcome: No Change  Patient A&O x4 with some forgetfulness upon awaking. Knew person, place and time. On 1LPM sating in the mid 90's. Oxycodone given for c/o generalized achy pain. Patient sleeping upon reassessment. Febrile at 101.2 recheck was 99.7 with no interventions. Patient seemed to break fever on own as patient was diaphoretic. Encouraged patient to do IS, did 3 times and got up to 1500. Crackles in bases. Non-productive congested cough. On tele, NSR. PIV in left hand, SL. SBA with walker.

## 2017-08-12 NOTE — CONSULTS
INFECTIOUS DISEASE CONSULTATION        Room 807 at [facility].        REQUESTING PHYSICIAN:  Dr. Fu      IMPRESSION:   1.  Carlie Myers is a 61-year-old female with 2 weeks of cough, low-grade fever and left pleuritic chest pain, found to have left pleural fluid, possibly some infiltrate, treated as pneumonia, not resolving, including ongoing fever, now pleural tap is exudative, probably has an empyema.   2.  History of chronic pain syndrome with multiple prior spine surgeries, ongoing pain, currently being evaluated.  No real signs of spine infection.   3.  Prior bilateral knee replacements.   4.  Tobacco abuse.      RECOMMENDATIONS:   1.  Agree with switch to Zosyn.   2.  Needs CT scan and Thoracic Surgery evaluation, most likely will need intervention, this is probably infected parapneumonic fluid.      HISTORY OF PRESENT ILLNESS:  This 61-year-old female is seen in consultation due to apparent pneumonia and now infected pleural fluid.  The patient has a history of being in her usual state of health until about a little over 2 weeks ago when she had onset of malaise, cough and pleuritic chest pain.  She was actually in the midst of being evaluated for chronic back problem including a CAT scan and possible MRI scan.  It was decided to hold off on these interventions.  She was started on Augmentin.  She did not improve on that, including some low-grade fever.  At admission here had more pleural fluid than infiltrate and was started on ceftriaxone and Zithromax and has not really improved.  Ongoing pleuritic pain, cough and now fever to 101 degrees again.  Had a pleural tap done which was bloody fluid with exudative appearance.  Cultures are pending, has had no positive cultures to date.  Prior to this has had some respiratory infections historically but no major pneumonias or similar historically.      FAMILY AND SOCIAL HISTORY:  No recent travel or exposures.  No known resistant pathogens.  She is a  cigarette smoker, a pack a day.  No particular known resistant pathogens or illnesses in the family of note.      ALLERGIES:  None.      MEDICATIONS:  As listed.  Had been on Augmentin, now getting ceftriaxone and Zithromax, just switched to Zosyn.      REVIEW OF SYSTEMS:  Ongoing pleuritic pain, feels poorly, some degree of nausea, underlying malaise, fatigue, coughing considerable amount.      PHYSICAL EXAMINATION:   GENERAL:  The patient appears her stated age.   VITAL SIGNS:  Recent temperature 101.2, pulse of 100, looks mildly ill, currently is nauseated.  Low flow oxygen being used.   HEENT:  No thrush or intraoral lesions.  Pupils reactive.   NECK:  Supple and nontender, no lymphadenopathy.   HEART:  Regular rhythm, no murmur or gallop.   LUNGS:  Decreased breath sounds to some degree at both bases and crackles on the left.   ABDOMEN:  Soft and nontender.   EXTREMITIES:  No rash or skin lesions, some degree of spine tenderness without visible abnormality.      LABORATORY DATA:  Procalcitonin borderline.  Cultures have been negative.  Imaging suggests more fluid than infiltrate.      Thank you much for this consultation.  I will follow the patient with you.         GABRIELA ARTHUR MD             D: 2017 08:58   T: 2017 09:27   MT: PAT      Name:     MARTIN BONDS   MRN:      [id number]        Account:       [id number]   :      1955           Consult Date:  2017      Document: [id number]       cc: Tej Fu MD

## 2017-08-13 LAB
ABO + RH BLD: NORMAL
ABO + RH BLD: NORMAL
ANION GAP SERPL CALCULATED.3IONS-SCNC: 6 MMOL/L (ref 3–14)
ANION GAP SERPL CALCULATED.3IONS-SCNC: 8 MMOL/L (ref 3–14)
BACTERIA SPEC CULT: NO GROWTH
BACTERIA SPEC CULT: NO GROWTH
BLD GP AB SCN SERPL QL: NORMAL
BLOOD BANK CMNT PATIENT-IMP: NORMAL
BUN SERPL-MCNC: 13 MG/DL (ref 7–30)
BUN SERPL-MCNC: 14 MG/DL (ref 7–30)
CALCIUM SERPL-MCNC: 8.6 MG/DL (ref 8.5–10.1)
CALCIUM SERPL-MCNC: 8.7 MG/DL (ref 8.5–10.1)
CHLORIDE SERPL-SCNC: 96 MMOL/L (ref 94–109)
CHLORIDE SERPL-SCNC: 98 MMOL/L (ref 94–109)
CO2 SERPL-SCNC: 29 MMOL/L (ref 20–32)
CO2 SERPL-SCNC: 32 MMOL/L (ref 20–32)
CREAT SERPL-MCNC: 0.66 MG/DL (ref 0.52–1.04)
CREAT SERPL-MCNC: 0.74 MG/DL (ref 0.52–1.04)
ERYTHROCYTE [DISTWIDTH] IN BLOOD BY AUTOMATED COUNT: 14.2 % (ref 10–15)
ERYTHROCYTE [DISTWIDTH] IN BLOOD BY AUTOMATED COUNT: 14.3 % (ref 10–15)
GFR SERPL CREATININE-BSD FRML MDRD: 80 ML/MIN/1.7M2
GFR SERPL CREATININE-BSD FRML MDRD: NORMAL ML/MIN/1.7M2
GLUCOSE SERPL-MCNC: 92 MG/DL (ref 70–99)
GLUCOSE SERPL-MCNC: 94 MG/DL (ref 70–99)
HCT VFR BLD AUTO: 32.8 % (ref 35–47)
HCT VFR BLD AUTO: 34.2 % (ref 35–47)
HGB BLD-MCNC: 10.7 G/DL (ref 11.7–15.7)
HGB BLD-MCNC: 11.1 G/DL (ref 11.7–15.7)
INR PPP: 1.09 (ref 0.86–1.14)
Lab: NORMAL
Lab: NORMAL
MCH RBC QN AUTO: 30.6 PG (ref 26.5–33)
MCH RBC QN AUTO: 30.7 PG (ref 26.5–33)
MCHC RBC AUTO-ENTMCNC: 32.5 G/DL (ref 31.5–36.5)
MCHC RBC AUTO-ENTMCNC: 32.6 G/DL (ref 31.5–36.5)
MCV RBC AUTO: 94 FL (ref 78–100)
MCV RBC AUTO: 95 FL (ref 78–100)
MICRO REPORT STATUS: NORMAL
MICRO REPORT STATUS: NORMAL
PLATELET # BLD AUTO: 188 10E9/L (ref 150–450)
PLATELET # BLD AUTO: 191 10E9/L (ref 150–450)
POTASSIUM SERPL-SCNC: 3.5 MMOL/L (ref 3.4–5.3)
POTASSIUM SERPL-SCNC: 3.8 MMOL/L (ref 3.4–5.3)
RBC # BLD AUTO: 3.5 10E12/L (ref 3.8–5.2)
RBC # BLD AUTO: 3.62 10E12/L (ref 3.8–5.2)
SODIUM SERPL-SCNC: 134 MMOL/L (ref 133–144)
SODIUM SERPL-SCNC: 135 MMOL/L (ref 133–144)
SPECIMEN EXP DATE BLD: NORMAL
SPECIMEN SOURCE: NORMAL
SPECIMEN SOURCE: NORMAL
WBC # BLD AUTO: 2.5 10E9/L (ref 4–11)
WBC # BLD AUTO: 2.6 10E9/L (ref 4–11)

## 2017-08-13 PROCEDURE — A9270 NON-COVERED ITEM OR SERVICE: HCPCS | Mod: GY | Performed by: INTERNAL MEDICINE

## 2017-08-13 PROCEDURE — 99232 SBSQ HOSP IP/OBS MODERATE 35: CPT | Performed by: INTERNAL MEDICINE

## 2017-08-13 PROCEDURE — 25000132 ZZH RX MED GY IP 250 OP 250 PS 637: Mod: GY | Performed by: INTERNAL MEDICINE

## 2017-08-13 PROCEDURE — 36415 COLL VENOUS BLD VENIPUNCTURE: CPT | Performed by: THORACIC SURGERY (CARDIOTHORACIC VASCULAR SURGERY)

## 2017-08-13 PROCEDURE — 12000000 ZZH R&B MED SURG/OB

## 2017-08-13 PROCEDURE — 80048 BASIC METABOLIC PNL TOTAL CA: CPT | Performed by: INTERNAL MEDICINE

## 2017-08-13 PROCEDURE — 85610 PROTHROMBIN TIME: CPT | Performed by: THORACIC SURGERY (CARDIOTHORACIC VASCULAR SURGERY)

## 2017-08-13 PROCEDURE — 36415 COLL VENOUS BLD VENIPUNCTURE: CPT | Performed by: INTERNAL MEDICINE

## 2017-08-13 PROCEDURE — 86850 RBC ANTIBODY SCREEN: CPT | Performed by: THORACIC SURGERY (CARDIOTHORACIC VASCULAR SURGERY)

## 2017-08-13 PROCEDURE — 86900 BLOOD TYPING SEROLOGIC ABO: CPT | Performed by: THORACIC SURGERY (CARDIOTHORACIC VASCULAR SURGERY)

## 2017-08-13 PROCEDURE — 80048 BASIC METABOLIC PNL TOTAL CA: CPT | Performed by: THORACIC SURGERY (CARDIOTHORACIC VASCULAR SURGERY)

## 2017-08-13 PROCEDURE — 85027 COMPLETE CBC AUTOMATED: CPT | Performed by: INTERNAL MEDICINE

## 2017-08-13 PROCEDURE — 25000128 H RX IP 250 OP 636: Performed by: INTERNAL MEDICINE

## 2017-08-13 PROCEDURE — 86901 BLOOD TYPING SEROLOGIC RH(D): CPT | Performed by: THORACIC SURGERY (CARDIOTHORACIC VASCULAR SURGERY)

## 2017-08-13 PROCEDURE — 85027 COMPLETE CBC AUTOMATED: CPT | Performed by: THORACIC SURGERY (CARDIOTHORACIC VASCULAR SURGERY)

## 2017-08-13 RX ORDER — SODIUM CHLORIDE 9 MG/ML
INJECTION, SOLUTION INTRAVENOUS CONTINUOUS
Status: DISCONTINUED | OUTPATIENT
Start: 2017-08-13 | End: 2017-08-14

## 2017-08-13 RX ADMIN — GABAPENTIN 800 MG: 800 TABLET, FILM COATED ORAL at 08:18

## 2017-08-13 RX ADMIN — TIZANIDINE 2 MG: 2 TABLET ORAL at 08:17

## 2017-08-13 RX ADMIN — PIPERACILLIN SODIUM,TAZOBACTAM SODIUM 3.38 G: 3; .375 INJECTION, POWDER, FOR SOLUTION INTRAVENOUS at 01:53

## 2017-08-13 RX ADMIN — ZOLPIDEM TARTRATE 10 MG: 5 TABLET, FILM COATED ORAL at 21:46

## 2017-08-13 RX ADMIN — HYDROXYZINE HYDROCHLORIDE 50 MG: 25 TABLET ORAL at 21:46

## 2017-08-13 RX ADMIN — OXYCODONE HYDROCHLORIDE 80 MG: 40 TABLET, FILM COATED, EXTENDED RELEASE ORAL at 18:23

## 2017-08-13 RX ADMIN — METHYLPHENIDATE HYDROCHLORIDE 20 MG: 10 TABLET ORAL at 13:33

## 2017-08-13 RX ADMIN — PIPERACILLIN SODIUM,TAZOBACTAM SODIUM 3.38 G: 3; .375 INJECTION, POWDER, FOR SOLUTION INTRAVENOUS at 08:18

## 2017-08-13 RX ADMIN — DIAZEPAM 5 MG: 5 TABLET ORAL at 21:46

## 2017-08-13 RX ADMIN — OXYCODONE HYDROCHLORIDE 80 MG: 40 TABLET, FILM COATED, EXTENDED RELEASE ORAL at 12:10

## 2017-08-13 RX ADMIN — OMEPRAZOLE 40 MG: 20 CAPSULE, DELAYED RELEASE ORAL at 08:18

## 2017-08-13 RX ADMIN — OXYCODONE HYDROCHLORIDE 10 MG: 5 TABLET ORAL at 06:12

## 2017-08-13 RX ADMIN — GABAPENTIN 800 MG: 800 TABLET, FILM COATED ORAL at 16:04

## 2017-08-13 RX ADMIN — TIZANIDINE 2 MG: 2 TABLET ORAL at 21:46

## 2017-08-13 RX ADMIN — IBUPROFEN 600 MG: 600 TABLET ORAL at 08:17

## 2017-08-13 RX ADMIN — BUTALBITAL, ACETAMINOPHEN, CAFFEINE, AND CODEINE PHOSPHATE 2 CAPSULE: 30; 50; 40; 325 CAPSULE ORAL at 21:46

## 2017-08-13 RX ADMIN — METHYLPHENIDATE HYDROCHLORIDE 20 MG: 10 TABLET ORAL at 07:07

## 2017-08-13 RX ADMIN — BUPROPION HYDROCHLORIDE 300 MG: 300 TABLET, FILM COATED, EXTENDED RELEASE ORAL at 08:17

## 2017-08-13 RX ADMIN — PIPERACILLIN SODIUM,TAZOBACTAM SODIUM 3.38 G: 3; .375 INJECTION, POWDER, FOR SOLUTION INTRAVENOUS at 21:46

## 2017-08-13 RX ADMIN — DIAZEPAM 5 MG: 5 TABLET ORAL at 16:04

## 2017-08-13 RX ADMIN — TRIAMTERENE AND HYDROCHLOROTHIAZIDE 1 TABLET: 37.5; 25 TABLET ORAL at 08:18

## 2017-08-13 RX ADMIN — OXYCODONE HYDROCHLORIDE 80 MG: 40 TABLET, FILM COATED, EXTENDED RELEASE ORAL at 08:16

## 2017-08-13 RX ADMIN — SODIUM CHLORIDE: 9 INJECTION, SOLUTION INTRAVENOUS at 12:12

## 2017-08-13 RX ADMIN — METHYLPHENIDATE HYDROCHLORIDE 20 MG: 10 TABLET ORAL at 09:38

## 2017-08-13 RX ADMIN — SERTRALINE HYDROCHLORIDE 150 MG: 100 TABLET ORAL at 08:17

## 2017-08-13 RX ADMIN — THERA TABS 1 TABLET: TAB at 08:17

## 2017-08-13 RX ADMIN — IBUPROFEN 600 MG: 600 TABLET ORAL at 18:23

## 2017-08-13 RX ADMIN — OXYCODONE HYDROCHLORIDE 10 MG: 5 TABLET ORAL at 02:16

## 2017-08-13 RX ADMIN — GABAPENTIN 800 MG: 800 TABLET, FILM COATED ORAL at 21:46

## 2017-08-13 RX ADMIN — SODIUM CHLORIDE: 9 INJECTION, SOLUTION INTRAVENOUS at 09:39

## 2017-08-13 RX ADMIN — GUAIFENESIN AND DEXTROMETHORPHAN HYDROBROMIDE 1 TABLET: 600; 30 TABLET, EXTENDED RELEASE ORAL at 08:17

## 2017-08-13 RX ADMIN — PIPERACILLIN SODIUM,TAZOBACTAM SODIUM 3.38 G: 3; .375 INJECTION, POWDER, FOR SOLUTION INTRAVENOUS at 13:33

## 2017-08-13 RX ADMIN — DIAZEPAM 5 MG: 5 TABLET ORAL at 08:17

## 2017-08-13 RX ADMIN — HYDROXYZINE HYDROCHLORIDE 50 MG: 25 TABLET ORAL at 16:04

## 2017-08-13 RX ADMIN — BUTALBITAL, ACETAMINOPHEN, CAFFEINE, AND CODEINE PHOSPHATE 2 CAPSULE: 30; 50; 40; 325 CAPSULE ORAL at 07:08

## 2017-08-13 RX ADMIN — HYDROXYZINE HYDROCHLORIDE 50 MG: 25 TABLET ORAL at 08:24

## 2017-08-13 RX ADMIN — OXYCODONE HYDROCHLORIDE 5 MG: 5 TABLET ORAL at 13:46

## 2017-08-13 RX ADMIN — IBUPROFEN 600 MG: 600 TABLET ORAL at 12:10

## 2017-08-13 RX ADMIN — METHYLPHENIDATE HYDROCHLORIDE 20 MG: 10 TABLET ORAL at 18:23

## 2017-08-13 RX ADMIN — BENZONATATE 100 MG: 100 CAPSULE ORAL at 21:46

## 2017-08-13 RX ADMIN — IBUPROFEN 600 MG: 600 TABLET ORAL at 21:46

## 2017-08-13 RX ADMIN — SODIUM CHLORIDE 500 ML: 9 INJECTION, SOLUTION INTRAVENOUS at 02:13

## 2017-08-13 RX ADMIN — OXYCODONE HYDROCHLORIDE 80 MG: 40 TABLET, FILM COATED, EXTENDED RELEASE ORAL at 21:47

## 2017-08-13 RX ADMIN — BUTALBITAL, ACETAMINOPHEN, CAFFEINE, AND CODEINE PHOSPHATE 2 CAPSULE: 30; 50; 40; 325 CAPSULE ORAL at 13:46

## 2017-08-13 RX ADMIN — GUAIFENESIN AND DEXTROMETHORPHAN HYDROBROMIDE 1 TABLET: 600; 30 TABLET, EXTENDED RELEASE ORAL at 21:46

## 2017-08-13 ASSESSMENT — PAIN DESCRIPTION - DESCRIPTORS: DESCRIPTORS: THROBBING

## 2017-08-13 NOTE — PLAN OF CARE
Problem: Goal Outcome Summary  Goal: Goal Outcome Summary  Outcome: No Change  Patient A&O x4.Lethargic at times. On 1-2LPM sating in the mid 90's. Scheduled Oxycontin and oxcodone prn given for pain to back and L side. Gave prn ambien for sleep at HS.  C/o headache at HS given fioricet prn. Diaphoretic once. Encouraged patient to do IS.   Crackles in bases. Non-productive congested cough, need sputum scheduled mucinex and prn tessalon mor. On tele, NSR.  Tolerating regular diet, fair appetite.  Ambulated in room and hallway x 1 with SBA and FWW.  Will continue to monitor.

## 2017-08-13 NOTE — PROGRESS NOTES
[facility]  Infectious Disease Progress Note          Assessment and Plan:   IMPRESSION:   1.  Carlie Myers is a 61-year-old female with 2 weeks of cough, low-grade fever and left pleuritic chest pain, found to have left pleural fluid, possibly some infiltrate, treated as pneumonia, not resolving, including ongoing fever, now pleural tap is exudative, probably has an empyema.   2.  History of chronic pain syndrome with multiple prior spine surgeries, ongoing pain, currently being evaluated.  No real signs of spine infection.   3.  Prior bilateral knee replacements.   4.  Tobacco abuse.       RECOMMENDATIONS:   1.  Continue Zosyn. Cx pending likely to be neg on antibiotics  2.  Loculated effusion CT scan, await Thoracic Surgery plan, most likely will need intervention, this is probably infected parapneumonic fluid.             Interval History:   no new complaints crying and feels miserable, CT noted, exudate, T down, cx pending              Medications:       piperacillin-tazobactam  3.375 g Intravenous Q6H     dextromethorphan-guaiFENesin  1 tablet Oral BID     sodium chloride (PF)  3 mL Intracatheter Q8H     diazepam (VALIUM) tablet 5 mg  5 mg Oral TID     gabapentin  800 mg Oral TID     hydrOXYzine  50 mg Oral TID     ibuprofen (ADVIL/MOTRIN) tablet 600 mg  600 mg Oral 4x Daily     methylphenidate  20 mg Oral 4x Daily     multivitamin, therapeutic  1 tablet Oral Daily     omeprazole (priLOSEC) CR capsule 40 mg  40 mg Oral QAM     oxyCODONE (OxyCONTIN) 12 hr tablet 80 mg  80 mg Oral 4x Daily     sertraline (ZOLOFT) tablet 150 mg  150 mg Oral Daily     tiZANidine (ZANAFLEX) tablet 2 mg  2 mg Oral BID     triamterene-hydrochlorothiazide  1 tablet Oral Daily     buPROPion (WELLBUTRIN XL) 24 hr tablet 300 mg  300 mg Oral Daily                  Physical Exam:   Blood pressure 98/52, temperature 96.5  F (35.8  C), temperature source Oral, resp. rate 18, weight 86.5 kg (190 lb 11.2 oz), SpO2 96  %.  Wt Readings from Last 2 Encounters:   08/10/17 86.5 kg (190 lb 11.2 oz)   10/12/13 95.3 kg (210 lb)     Vital Signs with Ranges  Temp:  [96.4  F (35.8  C)-99.9  F (37.7  C)] 96.5  F (35.8  C)  Heart Rate:  [] 64  Resp:  [12-21] 18  BP: ()/(36-73) 98/52  SpO2:  [90 %-96 %] 96 %    Constitutional: Awake, alert, cooperative, no apparent distress crying   Lungs: Congestion to auscultation bilaterally, no crackles or wheezing   Cardiovascular: Regular rate and rhythm, normal S1 and S2, and no murmur noted   Abdomen: Normal bowel sounds, soft, non-distended, non-tender   Skin: No rashes, no cyanosis, no edema   Other:           Data:   All microbiology laboratory data reviewed.  Recent Labs   Lab Test  08/13/17   0653  08/10/17   0708  08/09/17   0717   WBC  2.6*  5.3  6.9   HGB  11.1*  10.9*  11.4*   HCT  34.2*  32.9*  34.9*   MCV  95  94  95   PLT  191  268  293     Recent Labs   Lab Test  08/13/17   0653  08/12/17   0635  08/11/17   0659   CR  0.66  0.67  0.62     No lab results found.  Recent Labs   Lab Test  08/12/17   0912  08/12/17   0827  08/11/17   1415  08/09/17   1240  08/09/17   1235  08/07/17   0425  08/07/17   0420   CULT  No growth after 14 hours  No growth after 14 hours  Culture negative monitoring continues  Culture negative monitoring continues  No growth after 4 days  No growth after 4 days  No growth  No growth

## 2017-08-13 NOTE — PROGRESS NOTES
[facility]    Hospitalist Progress Note    Date of Service (when I saw the patient): 08/13/2017    Assessment & Plan   Carlie Myers is a 61-year-old white female with a history of nicotine dependence, neuropathy, psoriatic arthritis, chronic pain syndrome, on multiple narcotics, mitral valve prolapse, who states that she is due for getting an MRI, and saw her primary care doctor on Wednesday, whereby she had a low-grade fever and a cough.      Sepsis, complicated parapneumonic effusion: Likely due to CAP. Diminished cough/inspiratory effort from chronic back pain. UA on admission negative. US 8/8 with small left effusion. Thoracentesis 8/11 with 20ml removed, appears exudative. Strep pneumo and legionella urine Ag negative. CT chest 8/12 with loculated left effusion. Rocephin/Zithromax discontinued and started Zosyn 8/12. RUQ Us negative.  - ID recommendations appreciated  - Thoracic surgery consult pending  - c/w Zosyn, afebrile x 24 hrs  - bld and pleural cx negative thus far  - IVF given low BP overnight, UO adequate  - WBC 12.0 --> 2.6 likely reactive from sepsis  - dry cough, if sputum send for gram stain and cx  - Pulmonary hygiene.   - Wean oxygen as able for sats 92%.   - Mucinex BID, prn tessalon      Hypochloremic hyponatremia:  Na normal on admission, down to 131 on 8/10. Patient with reduced oral intake due to lack of appetite. Given fluid bolus 8/12 for hypotension.  - Na 134 today   - IVF NS at 75ml/hr      Chronic pain syndrome, back pain: Stable.   Overnight oximetry needing 1lpm conts, consider OP sleep study  - c/w home narcotic regimen.     Hypertension:  - d/c PTA Maxzide given hypotension  - IVF as above      Hypokalemia:  - K replacement protocol    DVT Prophylaxis: Pneumatic Compression Devices  Code Status: Full Code    Disposition: Expected discharge in 2-3 days once thoracic surgery eval complete and clinically improved from PNA.    Charlotte Fu MD    Interval  History   No dizziness last night when BP low. Anxious from learning about complicated effusion.    -Data reviewed today: I reviewed all new labs and imaging results over the last 24 hours. I personally reviewed the chest CT image(s) showing loculated left pleural effusion.    Physical Exam   Temp: 97.3  F (36.3  C) Temp src: Oral BP: 101/53   Heart Rate: 79 Resp: 18 SpO2: 97 % O2 Device: None (Room air) Oxygen Delivery: 1 LPM  Vitals:    08/10/17 0546   Weight: 86.5 kg (190 lb 11.2 oz)     Vital Signs with Ranges  Temp:  [96.4  F (35.8  C)-99.9  F (37.7  C)] 97.3  F (36.3  C)  Heart Rate:  [] 79  Resp:  [12-21] 18  BP: ()/(36-73) 101/53  SpO2:  [90 %-97 %] 97 %  I/O last 3 completed shifts:  In: 1237 [P.O.:540; I.V.:697]  Out: 1600 [Urine:1600]    Constitutional: Awake, alert, cooperative, no apparent distress  Respiratory: Diminished breath sounds at left lung base with trace crackles, otherwise clear  Cardiovascular: Regular rate and rhythm, normal S1 and S2, and no murmur noted  GI: Normal bowel sounds, soft, non-distended, mild tenderness RUQ without rebound/guarding  Skin/Integumen: No rashes, no cyanosis, no edema    Medications     NaCl       - MEDICATION INSTRUCTIONS -         piperacillin-tazobactam  3.375 g Intravenous Q6H     dextromethorphan-guaiFENesin  1 tablet Oral BID     sodium chloride (PF)  3 mL Intracatheter Q8H     diazepam (VALIUM) tablet 5 mg  5 mg Oral TID     gabapentin  800 mg Oral TID     hydrOXYzine  50 mg Oral TID     ibuprofen (ADVIL/MOTRIN) tablet 600 mg  600 mg Oral 4x Daily     methylphenidate  20 mg Oral 4x Daily     multivitamin, therapeutic  1 tablet Oral Daily     omeprazole (priLOSEC) CR capsule 40 mg  40 mg Oral QAM     oxyCODONE (OxyCONTIN) 12 hr tablet 80 mg  80 mg Oral 4x Daily     sertraline (ZOLOFT) tablet 150 mg  150 mg Oral Daily     tiZANidine (ZANAFLEX) tablet 2 mg  2 mg Oral BID     buPROPion (WELLBUTRIN XL) 24 hr tablet 300 mg  300 mg Oral Daily        Data     Recent Labs  Lab 08/13/17  0653 08/12/17  0635 08/11/17  1545 08/11/17  0659 08/10/17  0708 08/09/17  0717  08/07/17  1630  08/07/17  0105   WBC 2.6*  --   --   --  5.3 6.9  < >  --   --  12.0*   HGB 11.1*  --   --   --  10.9* 11.4*  < >  --   --  10.3*   MCV 95  --   --   --  94 95  < >  --   --  93     --   --   --  268 293  < >  --   --  262   INR  --   --   --   --   --   --   --  1.10  --   --     132*  --  129* 131* 135  < >  --   --  136   POTASSIUM 3.5 4.7 3.8 3.3* 3.5 3.4  < >  --   < > 3.0*   CHLORIDE 96 94  --  90* 90* 93*  < >  --   --  97   CO2 32 27  --  32 33* 35*  < >  --   --  31   BUN 14 12  --  12 11 8  < >  --   --  11   CR 0.66 0.67  --  0.62 0.66 0.58  < >  --   --  0.63   ANIONGAP 6 11  --  7 8 7  < >  --   --  8   BENNY 8.7 8.9  --  9.1 9.0 9.1  < >  --   --  7.8*   GLC 94 86  --  107* 113* 97  < >  --   --  115*   ALBUMIN  --   --   --   --   --  2.4*  --   --   --  2.4*   PROTTOTAL  --   --   --  7.5  --  7.2  --   --   < > 6.4*   BILITOTAL  --   --   --   --   --  0.2  --   --   --  0.3   ALKPHOS  --   --   --   --   --  114  --   --   --  100   ALT  --   --   --   --   --  53*  --   --   --  66*   AST  --   --   --   --   --  31  --   --   --  21   TROPI  --   --   --   --   --  <0.015The 99th percentile for upper reference range is 0.045 ug/L.  Troponin values in the range of 0.045 - 0.120 ug/L may be associated with risks of adverse clinical events.  --   --   --  <0.015The 99th percentile for upper reference range is 0.045 ug/L.  Troponin values in the range of 0.045 - 0.120 ug/L may be associated with risks of adverse clinical events.   < > = values in this interval not displayed.    Recent Results (from the past 24 hour(s))   CT Chest w Contrast    Narrative    CT CHEST W CONTRAST 8/12/2017 11:40 AM    HISTORY: Fever.    TECHNIQUE: CT of the chest is performed with IV contrast. 75 mL  Isovue-370 is given intravenously for this study.  Radiation dose  for  this scan was reduced using automated exposure control, adjustment of  the mA and/or kV according to patient size, or iterative  reconstruction technique.    COMPARISON: None.    FINDINGS: There are scattered patchy opacities of the left lower lobe,  suggestive of possible infection. There is a loculated mild to  moderate left effusion with fluid extending into the major fissure.  The right lung is clear. No pneumothorax.    No axillary lymphadenopathy. Scattered shotty subcentimeter  mediastinal lymph nodes.    Visualized portions of the upper abdomen demonstrate scattered  hypodensities in the superior liver, likely small cysts.      Impression    IMPRESSION:    1. Patchy opacities of the left lower lobe, suggestive of infection.  2. Moderate left loculated effusion.    SHARDA JOYCE MD   US Abdomen Limited    Narrative    US ABDOMEN LIMITED 8/12/2017 11:56 AM    HISTORY: Pain.    TECHNIQUE: Limited abdominal ultrasound to the right upper quadrant is  performed.    COMPARISON: None.    FINDINGS: The liver is normal, measuring 17 cm in diameter. Visualized  portions of the pancreas are unremarkable.    Normal gallbladder. No sonographic evidence of gallstones. Normal  gallbladder wall thickness. No sonographic Lantigua sign. The common  hepatic duct measures 8 mm in diameter.    Normal right kidney.        Impression    IMPRESSION:  Normal right upper quadrant ultrasound.      SHARDA JOYCE MD

## 2017-08-13 NOTE — PLAN OF CARE
Problem: Goal Outcome Summary  Goal: Goal Outcome Summary  Outcome: No Change  A/ox4, sad/crying this shift. VSS. C/o of mid back/left rib pain, PRN oxy (10mg) given. C/o of headache, PRN fioraiect given. Tele NSR. Currently on 1L O2, stating in mid 90's. Diaphoretic x1 this shift. Crackles present in L base, non-productive cough. Plan for L vasts tomorrow afternoon, pt needs to be NPO at midnight tonight. PIV infusing with NS @ 75mL/hr. Continue to monitor.

## 2017-08-13 NOTE — PLAN OF CARE
"Problem: Goal Outcome Summary  Goal: Goal Outcome Summary  Outcome: No Change  Patient A&O x4, lethargic at times. Afebrile, but diaphoretic at times. C/o pain in back rated at a \"8\". Prn Oxycodone given with small relief as pain was at a \"7\" doing pain reassessment. On 1 LPM sating in the mid 90's. Diaphoretic x 1. Patient hypotensive on shift, in the mid to high 80's. Asymptomatic. MD called and received 500 mL NS bolus x1. BP rechecked and it was 98/52. Crackles in bases.  Tele-NSR. Non-productive congested cough. PIV in right arm. SBA and walker.            "

## 2017-08-13 NOTE — PROGRESS NOTES
"CLINICAL NUTRITION SERVICES  -  ASSESSMENT NOTE      Recommendations Ordered by Registered Dietitian (RD):   Ensure 2pm daily (chocolate)   Malnutrition: Patient does not meet criteria necessary for diagnosing malnutrition        REASON FOR ASSESSMENT  Carlie Myers is a 61 year old female seen by Registered Dietitian for LOS      NUTRITION HISTORY  - Information obtained from pt  -Pt reports that she follows a regular diet and consumes meals once to twice a day. Pt reports usually both meals are lighter. Pt mostly consumes pre-made or frozen convenient food items, such as frozen meats. Pt repots that she no longer can do as much cooking as she once did.       CURRENT NUTRITION ORDERS  Diet Order:     Regular       Current Intake/Tolerance:  -Pt reports that appetite is poor and that PO intake has decreased since admission. Pt is eating about half of what she use to eat at baseline  -Per RN flowsheets, pt consuming 0%-50% of meals ordered       PHYSICAL FINDINGS  Observed  No nutrition-related physical findings observed  Obtained from Chart/Interdisciplinary Team  None noted    ANTHROPOMETRICS  Height: 5'4\"  Weight: 190 lbs 11.17 oz (86.5 kg )   Body mass index is 31.73 kg/(m^2).  Weight Status:  Obesity Grade I BMI 30-34.9  IBW: 54.5 kg   % IBW: 159%  Weight History: difficult to assess wt due to limited data  Wt Readings from Last 10 Encounters:   08/10/17 86.5 kg (190 lb 11.2 oz)   10/12/13 95.3 kg (210 lb)       LABS  Labs reviewed    MEDICATIONS  Medications reviewed    Dosing Weight 62.5 kg (adjusted wt, based on 86.5kg from 8/10)    ASSESSED NUTRITION NEEDS PER APPROVED PRACTICE GUIDELINES:  Estimated Energy Needs: 8326-2684 kcals (25-30 Kcal/Kg)  Justification: maintenance  Estimated Protein Needs: 63-75 grams protein (1-1.2 g pro/Kg)  Justification: preservation of lean body mass  Estimated Fluid Needs: 2994-3692  mL (25-30 mL/kg)  Justification: maintenance    MALNUTRITION:  % Weight Loss:  None noted  % " Intake:  </= 50% for >/= 5 days (severe malnutrition)  Subcutaneous Fat Loss:  None observed  Muscle Loss:  None observed  Fluid Retention:  None noted    Malnutrition Diagnosis: Patient does not meet two of the above criteria necessary for diagnosing malnutrition      NUTRITION DIAGNOSIS:  Inadequate oral intake related to decreased appetite and overall medical status as evidenced by pt reports of poor appetite and reduced PO intake by 50% when compared to baseline     NUTRITION INTERVENTIONS  Recommendations / Nutrition Prescription  Continue diet as ordered   Ensure once daily     Implementation  Nutrition education: Provided education on oral nutrition supplements   Medical Food Supplement- Ensure, 2pm daily (chocolate)      Nutrition Goals  Pt to consume at least 75% of meals ordered TID and oral nutrition supplements     MONITORING AND EVALUATION:  Progress towards goals will be monitored and evaluated per protocol and Practice Guidelines      Elisa Davis RD

## 2017-08-13 NOTE — PROGRESS NOTES
Soft systolic BP, in the 80s but pt is completely asymptomatic.    A/p:  500 cc NS bolus over one hour.    Jasbir Mccauley MD.   Hospitalist.  [phone], pager.

## 2017-08-13 NOTE — PROGRESS NOTES
MD Notification    Notified Person:  MD    Notified Persons Name: Dr. Mccauley    Notification Date/Time: 8/13 @ 0200    Notification Interaction:  Talked with Physician    Purpose of Notification: Low BP's, asymptomatic     Orders Received: Bolus of IV fluid    Comments:

## 2017-08-14 ENCOUNTER — ANESTHESIA EVENT (OUTPATIENT)
Dept: SURGERY | Facility: CLINIC | Age: 62
DRG: 853 | End: 2017-08-14
Payer: MEDICARE

## 2017-08-14 ENCOUNTER — APPOINTMENT (OUTPATIENT)
Dept: GENERAL RADIOLOGY | Facility: CLINIC | Age: 62
DRG: 853 | End: 2017-08-14
Attending: THORACIC SURGERY (CARDIOTHORACIC VASCULAR SURGERY)
Payer: MEDICARE

## 2017-08-14 ENCOUNTER — ANESTHESIA (OUTPATIENT)
Dept: SURGERY | Facility: CLINIC | Age: 62
DRG: 853 | End: 2017-08-14
Payer: MEDICARE

## 2017-08-14 PROBLEM — J91.8 PLEURAL EFFUSION ASSOCIATED WITH PULMONARY INFECTION: Status: ACTIVE | Noted: 2017-08-14

## 2017-08-14 PROBLEM — J18.9 PLEURAL EFFUSION ASSOCIATED WITH PULMONARY INFECTION: Status: ACTIVE | Noted: 2017-08-14

## 2017-08-14 LAB
ERYTHROCYTE [DISTWIDTH] IN BLOOD BY AUTOMATED COUNT: 14 % (ref 10–15)
HCT VFR BLD AUTO: 35.2 % (ref 35–47)
HGB BLD-MCNC: 11.4 G/DL (ref 11.7–15.7)
MCH RBC QN AUTO: 30.6 PG (ref 26.5–33)
MCHC RBC AUTO-ENTMCNC: 32.4 G/DL (ref 31.5–36.5)
MCV RBC AUTO: 94 FL (ref 78–100)
PLATELET # BLD AUTO: 174 10E9/L (ref 150–450)
RBC # BLD AUTO: 3.73 10E12/L (ref 3.8–5.2)
WBC # BLD AUTO: 2.5 10E9/L (ref 4–11)

## 2017-08-14 PROCEDURE — 99232 SBSQ HOSP IP/OBS MODERATE 35: CPT | Performed by: INTERNAL MEDICINE

## 2017-08-14 PROCEDURE — 37000008 ZZH ANESTHESIA TECHNICAL FEE, 1ST 30 MIN: Performed by: THORACIC SURGERY (CARDIOTHORACIC VASCULAR SURGERY)

## 2017-08-14 PROCEDURE — 25000132 ZZH RX MED GY IP 250 OP 250 PS 637: Mod: GY | Performed by: INTERNAL MEDICINE

## 2017-08-14 PROCEDURE — 25000125 ZZHC RX 250: Performed by: THORACIC SURGERY (CARDIOTHORACIC VASCULAR SURGERY)

## 2017-08-14 PROCEDURE — 40000986 XR CHEST PORT 1 VW

## 2017-08-14 PROCEDURE — 37000009 ZZH ANESTHESIA TECHNICAL FEE, EACH ADDTL 15 MIN: Performed by: THORACIC SURGERY (CARDIOTHORACIC VASCULAR SURGERY)

## 2017-08-14 PROCEDURE — 87070 CULTURE OTHR SPECIMN AEROBIC: CPT | Performed by: THORACIC SURGERY (CARDIOTHORACIC VASCULAR SURGERY)

## 2017-08-14 PROCEDURE — A9270 NON-COVERED ITEM OR SERVICE: HCPCS | Mod: GY | Performed by: INTERNAL MEDICINE

## 2017-08-14 PROCEDURE — 87116 MYCOBACTERIA CULTURE: CPT | Performed by: THORACIC SURGERY (CARDIOTHORACIC VASCULAR SURGERY)

## 2017-08-14 PROCEDURE — 93005 ELECTROCARDIOGRAM TRACING: CPT

## 2017-08-14 PROCEDURE — 87210 SMEAR WET MOUNT SALINE/INK: CPT | Performed by: THORACIC SURGERY (CARDIOTHORACIC VASCULAR SURGERY)

## 2017-08-14 PROCEDURE — 25000128 H RX IP 250 OP 636: Performed by: NURSE ANESTHETIST, CERTIFIED REGISTERED

## 2017-08-14 PROCEDURE — 25000132 ZZH RX MED GY IP 250 OP 250 PS 637: Mod: GY | Performed by: PHYSICIAN ASSISTANT

## 2017-08-14 PROCEDURE — 25000128 H RX IP 250 OP 636: Performed by: ANESTHESIOLOGY

## 2017-08-14 PROCEDURE — 27210794 ZZH OR GENERAL SUPPLY STERILE: Performed by: THORACIC SURGERY (CARDIOTHORACIC VASCULAR SURGERY)

## 2017-08-14 PROCEDURE — 25000566 ZZH SEVOFLURANE, EA 15 MIN: Performed by: THORACIC SURGERY (CARDIOTHORACIC VASCULAR SURGERY)

## 2017-08-14 PROCEDURE — 25000128 H RX IP 250 OP 636: Performed by: PHYSICIAN ASSISTANT

## 2017-08-14 PROCEDURE — 71000012 ZZH RECOVERY PHASE 1 LEVEL 1 FIRST HR: Performed by: THORACIC SURGERY (CARDIOTHORACIC VASCULAR SURGERY)

## 2017-08-14 PROCEDURE — 85027 COMPLETE CBC AUTOMATED: CPT | Performed by: INTERNAL MEDICINE

## 2017-08-14 PROCEDURE — 71000013 ZZH RECOVERY PHASE 1 LEVEL 1 EA ADDTL HR: Performed by: THORACIC SURGERY (CARDIOTHORACIC VASCULAR SURGERY)

## 2017-08-14 PROCEDURE — 87206 SMEAR FLUORESCENT/ACID STAI: CPT | Performed by: THORACIC SURGERY (CARDIOTHORACIC VASCULAR SURGERY)

## 2017-08-14 PROCEDURE — 27210995 ZZH RX 272: Performed by: THORACIC SURGERY (CARDIOTHORACIC VASCULAR SURGERY)

## 2017-08-14 PROCEDURE — 25000125 ZZHC RX 250: Performed by: NURSE ANESTHETIST, CERTIFIED REGISTERED

## 2017-08-14 PROCEDURE — 36000063 ZZH SURGERY LEVEL 4 EA 15 ADDTL MIN: Performed by: THORACIC SURGERY (CARDIOTHORACIC VASCULAR SURGERY)

## 2017-08-14 PROCEDURE — 36415 COLL VENOUS BLD VENIPUNCTURE: CPT | Performed by: INTERNAL MEDICINE

## 2017-08-14 PROCEDURE — A9270 NON-COVERED ITEM OR SERVICE: HCPCS | Mod: GY | Performed by: PHYSICIAN ASSISTANT

## 2017-08-14 PROCEDURE — 25000128 H RX IP 250 OP 636: Performed by: INTERNAL MEDICINE

## 2017-08-14 PROCEDURE — 0BDP4ZZ EXTRACTION OF LEFT PLEURA, PERCUTANEOUS ENDOSCOPIC APPROACH: ICD-10-PCS | Performed by: THORACIC SURGERY (CARDIOTHORACIC VASCULAR SURGERY)

## 2017-08-14 PROCEDURE — 12000007 ZZH R&B INTERMEDIATE

## 2017-08-14 PROCEDURE — S0020 INJECTION, BUPIVICAINE HYDRO: HCPCS | Performed by: THORACIC SURGERY (CARDIOTHORACIC VASCULAR SURGERY)

## 2017-08-14 PROCEDURE — 93010 ELECTROCARDIOGRAM REPORT: CPT | Performed by: INTERNAL MEDICINE

## 2017-08-14 PROCEDURE — 87102 FUNGUS ISOLATION CULTURE: CPT | Performed by: THORACIC SURGERY (CARDIOTHORACIC VASCULAR SURGERY)

## 2017-08-14 PROCEDURE — 40000170 ZZH STATISTIC PRE-PROCEDURE ASSESSMENT II: Performed by: THORACIC SURGERY (CARDIOTHORACIC VASCULAR SURGERY)

## 2017-08-14 PROCEDURE — 87075 CULTR BACTERIA EXCEPT BLOOD: CPT | Performed by: THORACIC SURGERY (CARDIOTHORACIC VASCULAR SURGERY)

## 2017-08-14 PROCEDURE — 0W9B40Z DRAINAGE OF LEFT PLEURAL CAVITY WITH DRAINAGE DEVICE, PERCUTANEOUS ENDOSCOPIC APPROACH: ICD-10-PCS | Performed by: THORACIC SURGERY (CARDIOTHORACIC VASCULAR SURGERY)

## 2017-08-14 PROCEDURE — 36000093 ZZH SURGERY LEVEL 4 1ST 30 MIN: Performed by: THORACIC SURGERY (CARDIOTHORACIC VASCULAR SURGERY)

## 2017-08-14 PROCEDURE — 25000125 ZZHC RX 250: Performed by: PHYSICIAN ASSISTANT

## 2017-08-14 PROCEDURE — 87176 TISSUE HOMOGENIZATION CULTR: CPT | Performed by: THORACIC SURGERY (CARDIOTHORACIC VASCULAR SURGERY)

## 2017-08-14 RX ORDER — MAGNESIUM HYDROXIDE 1200 MG/15ML
LIQUID ORAL PRN
Status: DISCONTINUED | OUTPATIENT
Start: 2017-08-14 | End: 2017-08-14 | Stop reason: HOSPADM

## 2017-08-14 RX ORDER — LIDOCAINE 40 MG/G
CREAM TOPICAL
Status: DISCONTINUED | OUTPATIENT
Start: 2017-08-14 | End: 2017-08-19 | Stop reason: HOSPADM

## 2017-08-14 RX ORDER — HYDROMORPHONE HYDROCHLORIDE 1 MG/ML
.3-.5 INJECTION, SOLUTION INTRAMUSCULAR; INTRAVENOUS; SUBCUTANEOUS EVERY 5 MIN PRN
Status: DISCONTINUED | OUTPATIENT
Start: 2017-08-14 | End: 2017-08-14 | Stop reason: HOSPADM

## 2017-08-14 RX ORDER — SODIUM CHLORIDE, SODIUM LACTATE, POTASSIUM CHLORIDE, CALCIUM CHLORIDE 600; 310; 30; 20 MG/100ML; MG/100ML; MG/100ML; MG/100ML
INJECTION, SOLUTION INTRAVENOUS CONTINUOUS
Status: DISCONTINUED | OUTPATIENT
Start: 2017-08-14 | End: 2017-08-14 | Stop reason: HOSPADM

## 2017-08-14 RX ORDER — PROPOFOL 10 MG/ML
INJECTION, EMULSION INTRAVENOUS PRN
Status: DISCONTINUED | OUTPATIENT
Start: 2017-08-14 | End: 2017-08-14

## 2017-08-14 RX ORDER — FENTANYL CITRATE 50 UG/ML
INJECTION, SOLUTION INTRAMUSCULAR; INTRAVENOUS PRN
Status: DISCONTINUED | OUTPATIENT
Start: 2017-08-14 | End: 2017-08-14

## 2017-08-14 RX ORDER — OXYCODONE HYDROCHLORIDE 5 MG/1
10-15 TABLET ORAL
Status: DISCONTINUED | OUTPATIENT
Start: 2017-08-14 | End: 2017-08-19 | Stop reason: HOSPADM

## 2017-08-14 RX ORDER — ONDANSETRON 4 MG/1
4 TABLET, ORALLY DISINTEGRATING ORAL EVERY 30 MIN PRN
Status: DISCONTINUED | OUTPATIENT
Start: 2017-08-14 | End: 2017-08-14 | Stop reason: HOSPADM

## 2017-08-14 RX ORDER — KETAMINE HYDROCHLORIDE 10 MG/ML
INJECTION, SOLUTION INTRAMUSCULAR; INTRAVENOUS PRN
Status: DISCONTINUED | OUTPATIENT
Start: 2017-08-14 | End: 2017-08-14

## 2017-08-14 RX ORDER — FENTANYL CITRATE 50 UG/ML
25-50 INJECTION, SOLUTION INTRAMUSCULAR; INTRAVENOUS
Status: DISCONTINUED | OUTPATIENT
Start: 2017-08-14 | End: 2017-08-14 | Stop reason: HOSPADM

## 2017-08-14 RX ORDER — AMOXICILLIN 250 MG
1-2 CAPSULE ORAL 2 TIMES DAILY
Status: DISCONTINUED | OUTPATIENT
Start: 2017-08-14 | End: 2017-08-17

## 2017-08-14 RX ORDER — NEOSTIGMINE METHYLSULFATE 1 MG/ML
VIAL (ML) INJECTION PRN
Status: DISCONTINUED | OUTPATIENT
Start: 2017-08-14 | End: 2017-08-14

## 2017-08-14 RX ORDER — EPHEDRINE SULFATE 50 MG/ML
INJECTION, SOLUTION INTRAMUSCULAR; INTRAVENOUS; SUBCUTANEOUS PRN
Status: DISCONTINUED | OUTPATIENT
Start: 2017-08-14 | End: 2017-08-14

## 2017-08-14 RX ORDER — GINSENG 100 MG
CAPSULE ORAL 3 TIMES DAILY
Status: DISCONTINUED | OUTPATIENT
Start: 2017-08-14 | End: 2017-08-19

## 2017-08-14 RX ORDER — PROCHLORPERAZINE MALEATE 5 MG
5-10 TABLET ORAL EVERY 6 HOURS PRN
Status: DISCONTINUED | OUTPATIENT
Start: 2017-08-14 | End: 2017-08-19 | Stop reason: HOSPADM

## 2017-08-14 RX ORDER — SODIUM CHLORIDE 9 MG/ML
INJECTION, SOLUTION INTRAVENOUS CONTINUOUS
Status: DISCONTINUED | OUTPATIENT
Start: 2017-08-14 | End: 2017-08-15

## 2017-08-14 RX ORDER — NALOXONE HYDROCHLORIDE 0.4 MG/ML
.1-.4 INJECTION, SOLUTION INTRAMUSCULAR; INTRAVENOUS; SUBCUTANEOUS
Status: DISCONTINUED | OUTPATIENT
Start: 2017-08-14 | End: 2017-08-19 | Stop reason: HOSPADM

## 2017-08-14 RX ORDER — LIDOCAINE HYDROCHLORIDE 20 MG/ML
INJECTION, SOLUTION INFILTRATION; PERINEURAL PRN
Status: DISCONTINUED | OUTPATIENT
Start: 2017-08-14 | End: 2017-08-14

## 2017-08-14 RX ORDER — ONDANSETRON 2 MG/ML
INJECTION INTRAMUSCULAR; INTRAVENOUS PRN
Status: DISCONTINUED | OUTPATIENT
Start: 2017-08-14 | End: 2017-08-14

## 2017-08-14 RX ORDER — BUPIVACAINE HYDROCHLORIDE 5 MG/ML
INJECTION, SOLUTION PERINEURAL PRN
Status: DISCONTINUED | OUTPATIENT
Start: 2017-08-14 | End: 2017-08-14 | Stop reason: HOSPADM

## 2017-08-14 RX ORDER — GLYCOPYRROLATE 0.2 MG/ML
INJECTION, SOLUTION INTRAMUSCULAR; INTRAVENOUS PRN
Status: DISCONTINUED | OUTPATIENT
Start: 2017-08-14 | End: 2017-08-14

## 2017-08-14 RX ORDER — ONDANSETRON 2 MG/ML
4 INJECTION INTRAMUSCULAR; INTRAVENOUS EVERY 30 MIN PRN
Status: DISCONTINUED | OUTPATIENT
Start: 2017-08-14 | End: 2017-08-14 | Stop reason: HOSPADM

## 2017-08-14 RX ORDER — OXYCODONE HCL 80 MG/1
80 TABLET, FILM COATED, EXTENDED RELEASE ORAL ONCE
Status: DISCONTINUED | OUTPATIENT
Start: 2017-08-14 | End: 2017-08-14 | Stop reason: HOSPADM

## 2017-08-14 RX ORDER — DEXAMETHASONE SODIUM PHOSPHATE 4 MG/ML
INJECTION, SOLUTION INTRA-ARTICULAR; INTRALESIONAL; INTRAMUSCULAR; INTRAVENOUS; SOFT TISSUE PRN
Status: DISCONTINUED | OUTPATIENT
Start: 2017-08-14 | End: 2017-08-14

## 2017-08-14 RX ADMIN — GABAPENTIN 800 MG: 800 TABLET, FILM COATED ORAL at 08:15

## 2017-08-14 RX ADMIN — PROPOFOL 160 MG: 10 INJECTION, EMULSION INTRAVENOUS at 17:41

## 2017-08-14 RX ADMIN — PHENYLEPHRINE HYDROCHLORIDE 100 MCG: 10 INJECTION, SOLUTION INTRAMUSCULAR; INTRAVENOUS; SUBCUTANEOUS at 18:22

## 2017-08-14 RX ADMIN — GUAIFENESIN AND DEXTROMETHORPHAN HYDROBROMIDE 1 TABLET: 600; 30 TABLET, EXTENDED RELEASE ORAL at 22:01

## 2017-08-14 RX ADMIN — GABAPENTIN 800 MG: 800 TABLET, FILM COATED ORAL at 22:03

## 2017-08-14 RX ADMIN — PHENYLEPHRINE HYDROCHLORIDE 50 MCG: 10 INJECTION, SOLUTION INTRAMUSCULAR; INTRAVENOUS; SUBCUTANEOUS at 18:28

## 2017-08-14 RX ADMIN — FENTANYL CITRATE 50 MCG: 50 INJECTION, SOLUTION INTRAMUSCULAR; INTRAVENOUS at 17:41

## 2017-08-14 RX ADMIN — PROPOFOL 40 MG: 10 INJECTION, EMULSION INTRAVENOUS at 17:45

## 2017-08-14 RX ADMIN — PHENYLEPHRINE HYDROCHLORIDE 100 MCG: 10 INJECTION, SOLUTION INTRAMUSCULAR; INTRAVENOUS; SUBCUTANEOUS at 17:55

## 2017-08-14 RX ADMIN — ONDANSETRON 4 MG: 2 INJECTION INTRAMUSCULAR; INTRAVENOUS at 17:59

## 2017-08-14 RX ADMIN — OMEPRAZOLE 40 MG: 20 CAPSULE, DELAYED RELEASE ORAL at 08:15

## 2017-08-14 RX ADMIN — DEXAMETHASONE SODIUM PHOSPHATE 4 MG: 4 INJECTION, SOLUTION INTRA-ARTICULAR; INTRALESIONAL; INTRAMUSCULAR; INTRAVENOUS; SOFT TISSUE at 17:59

## 2017-08-14 RX ADMIN — METHYLPHENIDATE HYDROCHLORIDE 20 MG: 10 TABLET ORAL at 06:50

## 2017-08-14 RX ADMIN — Medication 5 MG: at 18:34

## 2017-08-14 RX ADMIN — NEOSTIGMINE METHYLSULFATE 4.5 MG: 1 INJECTION INTRAMUSCULAR; INTRAVENOUS; SUBCUTANEOUS at 18:58

## 2017-08-14 RX ADMIN — TIZANIDINE 2 MG: 2 TABLET ORAL at 08:15

## 2017-08-14 RX ADMIN — OXYCODONE HYDROCHLORIDE 10 MG: 5 TABLET ORAL at 04:51

## 2017-08-14 RX ADMIN — METHYLPHENIDATE HYDROCHLORIDE 20 MG: 10 TABLET ORAL at 13:08

## 2017-08-14 RX ADMIN — PHENYLEPHRINE HYDROCHLORIDE 100 MCG: 10 INJECTION, SOLUTION INTRAMUSCULAR; INTRAVENOUS; SUBCUTANEOUS at 17:53

## 2017-08-14 RX ADMIN — Medication 5 MG: at 18:35

## 2017-08-14 RX ADMIN — SERTRALINE HYDROCHLORIDE 150 MG: 100 TABLET ORAL at 08:14

## 2017-08-14 RX ADMIN — METHYLPHENIDATE HYDROCHLORIDE 20 MG: 10 TABLET ORAL at 09:31

## 2017-08-14 RX ADMIN — SODIUM CHLORIDE: 9 INJECTION, SOLUTION INTRAVENOUS at 22:11

## 2017-08-14 RX ADMIN — SODIUM CHLORIDE: 9 INJECTION, SOLUTION INTRAVENOUS at 04:47

## 2017-08-14 RX ADMIN — DIAZEPAM 5 MG: 5 TABLET ORAL at 22:02

## 2017-08-14 RX ADMIN — FENTANYL CITRATE 50 MCG: 50 INJECTION, SOLUTION INTRAMUSCULAR; INTRAVENOUS at 19:47

## 2017-08-14 RX ADMIN — OXYCODONE HYDROCHLORIDE 80 MG: 40 TABLET, FILM COATED, EXTENDED RELEASE ORAL at 08:14

## 2017-08-14 RX ADMIN — HYDROMORPHONE HYDROCHLORIDE 0.5 MG: 1 INJECTION, SOLUTION INTRAMUSCULAR; INTRAVENOUS; SUBCUTANEOUS at 20:03

## 2017-08-14 RX ADMIN — PHENYLEPHRINE HYDROCHLORIDE 200 MCG: 10 INJECTION, SOLUTION INTRAMUSCULAR; INTRAVENOUS; SUBCUTANEOUS at 18:48

## 2017-08-14 RX ADMIN — PHENYLEPHRINE HYDROCHLORIDE 100 MCG: 10 INJECTION, SOLUTION INTRAMUSCULAR; INTRAVENOUS; SUBCUTANEOUS at 18:35

## 2017-08-14 RX ADMIN — PIPERACILLIN SODIUM,TAZOBACTAM SODIUM 3.38 G: 3; .375 INJECTION, POWDER, FOR SOLUTION INTRAVENOUS at 02:21

## 2017-08-14 RX ADMIN — HYDROXYZINE HYDROCHLORIDE 50 MG: 25 TABLET ORAL at 08:15

## 2017-08-14 RX ADMIN — OXYCODONE HYDROCHLORIDE 80 MG: 40 TABLET, FILM COATED, EXTENDED RELEASE ORAL at 12:20

## 2017-08-14 RX ADMIN — LIDOCAINE HYDROCHLORIDE 100 MG: 20 INJECTION, SOLUTION INFILTRATION; PERINEURAL at 17:41

## 2017-08-14 RX ADMIN — KETAMINE HYDROCHLORIDE 10 MG: 10 INJECTION, SOLUTION INTRAMUSCULAR; INTRAVENOUS at 17:41

## 2017-08-14 RX ADMIN — PIPERACILLIN SODIUM,TAZOBACTAM SODIUM 3.38 G: 3; .375 INJECTION, POWDER, FOR SOLUTION INTRAVENOUS at 08:11

## 2017-08-14 RX ADMIN — PROPOFOL 50 MG: 10 INJECTION, EMULSION INTRAVENOUS at 18:26

## 2017-08-14 RX ADMIN — DIAZEPAM 5 MG: 5 TABLET ORAL at 08:14

## 2017-08-14 RX ADMIN — BUPROPION HYDROCHLORIDE 300 MG: 300 TABLET, FILM COATED, EXTENDED RELEASE ORAL at 08:15

## 2017-08-14 RX ADMIN — HYDROMORPHONE HYDROCHLORIDE 0.5 MG: 1 INJECTION, SOLUTION INTRAMUSCULAR; INTRAVENOUS; SUBCUTANEOUS at 19:38

## 2017-08-14 RX ADMIN — KETAMINE HYDROCHLORIDE 20 MG: 10 INJECTION, SOLUTION INTRAMUSCULAR; INTRAVENOUS at 18:45

## 2017-08-14 RX ADMIN — ROCURONIUM BROMIDE 5 MG: 10 INJECTION INTRAVENOUS at 18:29

## 2017-08-14 RX ADMIN — ZOLPIDEM TARTRATE 10 MG: 5 TABLET, FILM COATED ORAL at 22:01

## 2017-08-14 RX ADMIN — HYDROMORPHONE HYDROCHLORIDE 0.5 MG: 1 INJECTION, SOLUTION INTRAMUSCULAR; INTRAVENOUS; SUBCUTANEOUS at 20:14

## 2017-08-14 RX ADMIN — THERA TABS 1 TABLET: TAB at 08:15

## 2017-08-14 RX ADMIN — PHENYLEPHRINE HYDROCHLORIDE 200 MCG: 10 INJECTION, SOLUTION INTRAMUSCULAR; INTRAVENOUS; SUBCUTANEOUS at 18:46

## 2017-08-14 RX ADMIN — KETAMINE HYDROCHLORIDE 10 MG: 10 INJECTION, SOLUTION INTRAMUSCULAR; INTRAVENOUS at 17:37

## 2017-08-14 RX ADMIN — TIZANIDINE 2 MG: 2 TABLET ORAL at 22:50

## 2017-08-14 RX ADMIN — GUAIFENESIN AND DEXTROMETHORPHAN HYDROBROMIDE 1 TABLET: 600; 30 TABLET, EXTENDED RELEASE ORAL at 08:14

## 2017-08-14 RX ADMIN — OXYCODONE HYDROCHLORIDE 10 MG: 5 TABLET ORAL at 00:31

## 2017-08-14 RX ADMIN — HYDROXYZINE HYDROCHLORIDE 50 MG: 25 TABLET ORAL at 22:03

## 2017-08-14 RX ADMIN — BUTALBITAL, ACETAMINOPHEN, CAFFEINE, AND CODEINE PHOSPHATE 2 CAPSULE: 30; 50; 40; 325 CAPSULE ORAL at 09:30

## 2017-08-14 RX ADMIN — MIDAZOLAM HYDROCHLORIDE 2 MG: 1 INJECTION, SOLUTION INTRAMUSCULAR; INTRAVENOUS at 17:37

## 2017-08-14 RX ADMIN — Medication 5 MG: at 18:38

## 2017-08-14 RX ADMIN — HYDROMORPHONE HYDROCHLORIDE 1 MG: 1 INJECTION, SOLUTION INTRAMUSCULAR; INTRAVENOUS; SUBCUTANEOUS at 16:42

## 2017-08-14 RX ADMIN — PIPERACILLIN SODIUM,TAZOBACTAM SODIUM 3.38 G: 3; .375 INJECTION, POWDER, FOR SOLUTION INTRAVENOUS at 13:08

## 2017-08-14 RX ADMIN — OXYCODONE HYDROCHLORIDE 5 MG: 5 TABLET ORAL at 09:30

## 2017-08-14 RX ADMIN — ROCURONIUM BROMIDE 50 MG: 10 INJECTION INTRAVENOUS at 17:41

## 2017-08-14 RX ADMIN — PIPERACILLIN SODIUM,TAZOBACTAM SODIUM 3.38 G: 3; .375 INJECTION, POWDER, FOR SOLUTION INTRAVENOUS at 19:26

## 2017-08-14 RX ADMIN — BACITRACIN: 500 OINTMENT TOPICAL at 22:58

## 2017-08-14 RX ADMIN — IBUPROFEN 600 MG: 600 TABLET ORAL at 08:14

## 2017-08-14 RX ADMIN — SODIUM CHLORIDE, POTASSIUM CHLORIDE, SODIUM LACTATE AND CALCIUM CHLORIDE: 600; 310; 30; 20 INJECTION, SOLUTION INTRAVENOUS at 17:35

## 2017-08-14 RX ADMIN — PHENYLEPHRINE HYDROCHLORIDE 150 MCG: 10 INJECTION, SOLUTION INTRAMUSCULAR; INTRAVENOUS; SUBCUTANEOUS at 18:26

## 2017-08-14 RX ADMIN — CALCIUM CARBONATE (ANTACID) CHEW TAB 500 MG 1000 MG: 500 CHEW TAB at 22:02

## 2017-08-14 RX ADMIN — GLYCOPYRROLATE 0.8 MG: 0.2 INJECTION, SOLUTION INTRAMUSCULAR; INTRAVENOUS at 18:58

## 2017-08-14 RX ADMIN — PHENYLEPHRINE HYDROCHLORIDE 100 MCG: 10 INJECTION, SOLUTION INTRAMUSCULAR; INTRAVENOUS; SUBCUTANEOUS at 18:36

## 2017-08-14 RX ADMIN — PHENYLEPHRINE HYDROCHLORIDE 100 MCG: 10 INJECTION, SOLUTION INTRAMUSCULAR; INTRAVENOUS; SUBCUTANEOUS at 18:38

## 2017-08-14 RX ADMIN — FENTANYL CITRATE 50 MCG: 50 INJECTION, SOLUTION INTRAMUSCULAR; INTRAVENOUS at 17:37

## 2017-08-14 RX ADMIN — BUTALBITAL, ACETAMINOPHEN, CAFFEINE, AND CODEINE PHOSPHATE 2 CAPSULE: 30; 50; 40; 325 CAPSULE ORAL at 22:01

## 2017-08-14 RX ADMIN — PHENYLEPHRINE HYDROCHLORIDE 100 MCG: 10 INJECTION, SOLUTION INTRAMUSCULAR; INTRAVENOUS; SUBCUTANEOUS at 18:32

## 2017-08-14 RX ADMIN — PROPOFOL 50 MG: 10 INJECTION, EMULSION INTRAVENOUS at 18:21

## 2017-08-14 RX ADMIN — IBUPROFEN 600 MG: 600 TABLET ORAL at 12:20

## 2017-08-14 RX ADMIN — PHENYLEPHRINE HYDROCHLORIDE 100 MCG: 10 INJECTION, SOLUTION INTRAMUSCULAR; INTRAVENOUS; SUBCUTANEOUS at 18:16

## 2017-08-14 RX ADMIN — SENNOSIDES AND DOCUSATE SODIUM 2 TABLET: 8.6; 5 TABLET ORAL at 22:50

## 2017-08-14 RX ADMIN — OXYCODONE HYDROCHLORIDE 80 MG: 40 TABLET, FILM COATED, EXTENDED RELEASE ORAL at 22:03

## 2017-08-14 RX ADMIN — FENTANYL CITRATE 50 MCG: 50 INJECTION, SOLUTION INTRAMUSCULAR; INTRAVENOUS at 19:32

## 2017-08-14 RX ADMIN — HYDROMORPHONE HYDROCHLORIDE 0.5 MG: 1 INJECTION, SOLUTION INTRAMUSCULAR; INTRAVENOUS; SUBCUTANEOUS at 19:51

## 2017-08-14 RX ADMIN — IBUPROFEN 600 MG: 600 TABLET ORAL at 22:02

## 2017-08-14 ASSESSMENT — LIFESTYLE VARIABLES: TOBACCO_USE: 1

## 2017-08-14 ASSESSMENT — PAIN DESCRIPTION - DESCRIPTORS
DESCRIPTORS: NAGGING
DESCRIPTORS: NAGGING

## 2017-08-14 NOTE — PLAN OF CARE
Problem: Goal Outcome Summary  Goal: Goal Outcome Summary  Outcome: No Change  A/ox4, anxious and depressed about hospital coarse and news of surgery. VSS. C/o of mid back/left rib pain, gave scheduled pain meds as ordered. Prn ambien, tessalon mor, and fioricet given at HS per patient request. Currently on 1L O2, stating in mid 90's. Crackles present in L base, non-productive cough. Tolerating regular diet well.  Up SBA in room with FWW to bathroom.  Plan for L vasts tomorrow afternoon, pt needs to be NPO at midnight tonight. PIV infusing with NS @ 75mL/hr. Continue to monitor.

## 2017-08-14 NOTE — ANESTHESIA PREPROCEDURE EVALUATION
Anesthesia Evaluation     . Pt has had prior anesthetic.     No history of anesthetic complications          ROS/MED HX    ENT/Pulmonary:     (+)tobacco use, , recent URI unresolved . .   (-) sleep apnea   Neurologic:       Cardiovascular:  - neg cardiovascular ROS       METS/Exercise Tolerance:     Hematologic:         Musculoskeletal:         GI/Hepatic:        (-) GERD   Renal/Genitourinary:         Endo:         Psychiatric:         Infectious Disease:         Malignancy:         Other: Comment: Chronic back pain, on oxycontin 80mg QID   (+) H/O Chronic Pain,H/O chronic opiod use ,                    Physical Exam  Normal systems: cardiovascular and pulmonary    Airway   Mallampati: II  TM distance: >3 FB  Neck ROM: limited    Dental   Comment: native    Cardiovascular       Pulmonary                     Anesthesia Plan      History & Physical Review  History and physical reviewed and following examination; no interval change.    ASA Status:  3 .    NPO Status:  > 8 hours    Plan for General and ETT   PONV prophylaxis:  Ondansetron (or other 5HT-3) and Dexamethasone or Solumedrol  Additional equipment: Videolaryngoscope and Double Lumen ETT      Postoperative Care  Postoperative pain management:  IV analgesics.      Consents  Anesthetic plan, risks, benefits and alternatives discussed with:  Patient..                          .

## 2017-08-14 NOTE — PROGRESS NOTES
[facility]  Infectious Disease Progress Note          Assessment and Plan:   IMPRESSION:   1.  Carlie Myers is a 61-year-old female with 2 weeks of cough, low-grade fever and left pleuritic chest pain, found to have left pleural fluid, possibly some infiltrate, treated as pneumonia, not resolving, including ongoing fever, now pleural tap is exudative, probably has an empyema.   2.  History of chronic pain syndrome with multiple prior spine surgeries, ongoing pain, currently being evaluated.  No real signs of spine infection.   3.  Prior bilateral knee replacements.   4.  Tobacco abuse.       RECOMMENDATIONS:   1.  Continue Zosyn. Cx pending likely to be neg on antibiotics  2.  Loculated effusion CT scan, note Thoracic Surgery plan, op today        Interval History:   no new complaints  feels miserable, CT noted, exudate, T down, cx pending              Medications:       piperacillin-tazobactam  3.375 g Intravenous Q6H     dextromethorphan-guaiFENesin  1 tablet Oral BID     sodium chloride (PF)  3 mL Intracatheter Q8H     diazepam (VALIUM) tablet 5 mg  5 mg Oral TID     gabapentin  800 mg Oral TID     hydrOXYzine  50 mg Oral TID     ibuprofen (ADVIL/MOTRIN) tablet 600 mg  600 mg Oral 4x Daily     methylphenidate  20 mg Oral 4x Daily     multivitamin, therapeutic  1 tablet Oral Daily     omeprazole (priLOSEC) CR capsule 40 mg  40 mg Oral QAM     oxyCODONE (OxyCONTIN) 12 hr tablet 80 mg  80 mg Oral 4x Daily     sertraline (ZOLOFT) tablet 150 mg  150 mg Oral Daily     tiZANidine (ZANAFLEX) tablet 2 mg  2 mg Oral BID     buPROPion (WELLBUTRIN XL) 24 hr tablet 300 mg  300 mg Oral Daily                  Physical Exam:   Blood pressure 133/64, pulse 76, temperature 98.3  F (36.8  C), temperature source Oral, resp. rate 14, weight 86.5 kg (190 lb 11.2 oz), SpO2 95 %.  Wt Readings from Last 2 Encounters:   08/10/17 86.5 kg (190 lb 11.2 oz)   10/12/13 95.3 kg (210 lb)     Vital Signs with  Ranges  Temp:  [96.4  F (35.8  C)-98.3  F (36.8  C)] 98.3  F (36.8  C)  Pulse:  [76-79] 76  Heart Rate:  [73-97] 80  Resp:  [14-18] 14  BP: (101-133)/(36-64) 133/64  SpO2:  [93 %-97 %] 95 %    Constitutional: Awake, alert, cooperative, no apparent distress    Lungs: Congestion to auscultation bilaterally, no crackles or wheezing   Cardiovascular: Regular rate and rhythm, normal S1 and S2, and no murmur noted   Abdomen: Normal bowel sounds, soft, non-distended, non-tender   Skin: No rashes, no cyanosis, no edema   Other:           Data:   All microbiology laboratory data reviewed.  Recent Labs   Lab Test  08/14/17   0708  08/13/17   1140  08/13/17   0653   WBC  2.5*  2.5*  2.6*   HGB  11.4*  10.7*  11.1*   HCT  35.2  32.8*  34.2*   MCV  94  94  95   PLT  174  188  191     Recent Labs   Lab Test  08/13/17   1140  08/13/17   0653  08/12/17   0635   CR  0.74  0.66  0.67     No lab results found.  Recent Labs   Lab Test  08/12/17   0912  08/12/17   0827  08/11/17   1415  08/09/17   1240  08/09/17   1235  08/07/17   0425  08/07/17   0420   CULT  No growth after 2 days  No growth after 2 days  Culture negative monitoring continues  Culture negative monitoring continues  No growth after 5 days  No growth after 5 days  No growth  No growth

## 2017-08-14 NOTE — PROGRESS NOTES
Notified Dr. Konrad Craig of patient c/o back pain at range of 9.  VO to give 80 Oxycodone.  Not in pyxis at that dose.  Not given.  New VO to give Dilaudid 2 mg IV and done.  Attached to monitor. Son at bedside.  Patient is verbalizing after Dilaudid given.  Report off to Katherine MARTE RN.

## 2017-08-14 NOTE — PROGRESS NOTES
[facility]    Hospitalist Progress Note    Date of Service (when I saw the patient): 08/14/2017    Assessment & Plan   Carlie Myers is a 61-year-old white female with a history of nicotine dependence, neuropathy, psoriatic arthritis, chronic pain syndrome, on multiple narcotics, mitral valve prolapse, who states that she is due for getting an MRI, and saw her primary care doctor on Wednesday, whereby she had a low-grade fever and a cough.      Sepsis, complicated parapneumonic effusion: Likely due to CAP. Diminished cough/inspiratory effort from chronic back pain. UA on admission negative. US 8/8 with small left effusion. Thoracentesis 8/11 with 20ml removed, appears exudative. Strep pneumo and legionella urine Ag negative. CT chest 8/12 with loculated left effusion. Rocephin/Zithromax discontinued and started Zosyn 8/12. RUQ Us negative. WBC 12.0 --> 2.5 likely reactive from sepsis.  - ID recommendations appreciated  - Thoracic surgery plan VATS today, NPO, RCRI  1pt for high risk surgery and history of 3-6 mets activity without cardiac symptoms --> EKG ordered for pre-op testing unremarkable, proceed with procedure, moderate risk  - c/w Zosyn, afebrile x 48 hrs  - bld and pleural cx negative thus far  - IVF, BP improved past 24 hrs  - dry cough, if sputum send for gram stain and cx  - Pulmonary hygiene.   - Wean oxygen as able for sats 92%.   - Mucinex BID, prn tessalon      Hypochloremic hyponatremia, resolved:  Na normal on admission, down to 131 on 8/10. Patient with reduced oral intake due to lack of appetite. Given fluid bolus 8/12 for hypotension.  - Na normal with IVF  - IVF NS at 75ml/hr      Chronic pain syndrome, back pain: Stable.   Overnight oximetry needing 1lpm conts, consider OP sleep study  - c/w home narcotic regimen.      Hypertension:  - Holding PTA Maxzide given hypotension  - IVF as above      Hypokalemia:  - K replacement protocol    DVT Prophylaxis: Pneumatic Compression  Devices  Code Status: Full Code    Disposition: Expected discharge in 3-4 days pending post-op recovery.    Charlotte Fu MD    Interval History   No new complaints. Continues to have intermittent dry cough. No fevers, BP improved since yesterday. Continues to have chest discomfort when coughing. Denies exertional chest pain/SOB/dizziness over past 6 months however activity limited by back pain. She says can walk 1/2 a block with a walker. Has 17 steps in home which she can do without exertional cardiac symptoms.     -Data reviewed today: I reviewed all new labs and imaging results over the last 24 hours. I personally reviewed no images or EKG's today.    Physical Exam   Temp: 98.3  F (36.8  C) Temp src: Oral BP: 133/64 Pulse: 76 Heart Rate: 80 Resp: 14 SpO2: 95 % O2 Device: Nasal cannula Oxygen Delivery: 2 LPM  Vitals:    08/10/17 0546   Weight: 86.5 kg (190 lb 11.2 oz)     Vital Signs with Ranges  Temp:  [96.4  F (35.8  C)-98.3  F (36.8  C)] 98.3  F (36.8  C)  Pulse:  [76-79] 76  Heart Rate:  [73-97] 80  Resp:  [14-18] 14  BP: (101-133)/(36-64) 133/64  SpO2:  [93 %-97 %] 95 %  I/O last 3 completed shifts:  In: 2343 [I.V.:2343]  Out: 925 [Urine:925]    Constitutional: Awake, alert, cooperative, no apparent distress  Respiratory: Diminished breath sounds left lung base and crackles heard, right lung clear  Cardiovascular: Regular rate and rhythm, normal S1 and S2, and no murmur noted  GI: Normal bowel sounds, soft, non-distended, non-tender  Skin/Integumen: No rashes, no cyanosis, no edema    Medications     NaCl 75 mL/hr at 08/14/17 4187     - MEDICATION INSTRUCTIONS -         piperacillin-tazobactam  3.375 g Intravenous Q6H     dextromethorphan-guaiFENesin  1 tablet Oral BID     sodium chloride (PF)  3 mL Intracatheter Q8H     diazepam (VALIUM) tablet 5 mg  5 mg Oral TID     gabapentin  800 mg Oral TID     hydrOXYzine  50 mg Oral TID     ibuprofen (ADVIL/MOTRIN) tablet 600 mg  600 mg Oral 4x Daily      methylphenidate  20 mg Oral 4x Daily     multivitamin, therapeutic  1 tablet Oral Daily     omeprazole (priLOSEC) CR capsule 40 mg  40 mg Oral QAM     oxyCODONE (OxyCONTIN) 12 hr tablet 80 mg  80 mg Oral 4x Daily     sertraline (ZOLOFT) tablet 150 mg  150 mg Oral Daily     tiZANidine (ZANAFLEX) tablet 2 mg  2 mg Oral BID     buPROPion (WELLBUTRIN XL) 24 hr tablet 300 mg  300 mg Oral Daily       Data     Recent Labs  Lab 08/14/17  0708 08/13/17  1140 08/13/17  0653 08/12/17  0635  08/11/17  0659  08/09/17  0717  08/07/17  1630   WBC 2.5* 2.5* 2.6*  --   --   --   < > 6.9  < >  --    HGB 11.4* 10.7* 11.1*  --   --   --   < > 11.4*  < >  --    MCV 94 94 95  --   --   --   < > 95  < >  --     188 191  --   --   --   < > 293  < >  --    INR  --  1.09  --   --   --   --   --   --   --  1.10   NA  --  135 134 132*  --  129*  < > 135  < >  --    POTASSIUM  --  3.8 3.5 4.7  < > 3.3*  < > 3.4  < >  --    CHLORIDE  --  98 96 94  --  90*  < > 93*  < >  --    CO2  --  29 32 27  --  32  < > 35*  < >  --    BUN  --  13 14 12  --  12  < > 8  < >  --    CR  --  0.74 0.66 0.67  --  0.62  < > 0.58  < >  --    ANIONGAP  --  8 6 11  --  7  < > 7  < >  --    BENNY  --  8.6 8.7 8.9  --  9.1  < > 9.1  < >  --    GLC  --  92 94 86  --  107*  < > 97  < >  --    ALBUMIN  --   --   --   --   --   --   --  2.4*  --   --    PROTTOTAL  --   --   --   --   --  7.5  --  7.2  --   --    BILITOTAL  --   --   --   --   --   --   --  0.2  --   --    ALKPHOS  --   --   --   --   --   --   --  114  --   --    ALT  --   --   --   --   --   --   --  53*  --   --    AST  --   --   --   --   --   --   --  31  --   --    TROPI  --   --   --   --   --   --   --  <0.015The 99th percentile for upper reference range is 0.045 ug/L.  Troponin values in the range of 0.045 - 0.120 ug/L may be associated with risks of adverse clinical events.  --   --    < > = values in this interval not displayed.    No results found for this or any previous visit (from the past  24 hour(s)).

## 2017-08-14 NOTE — PLAN OF CARE
Problem: Goal Outcome Summary  Goal: Goal Outcome Summary  Outcome: No Change  Patient A&O x4, lethargic at times. Is sad/anxious at times d/t surgery today. C/o lower back pain, prn Oxycodone given x2. No relief noted from patient after first Oxycodone administration. Patient resting in bed upon completing second pain reassessment. Currently on 1L O2, stating in mid 90's. Low diastolic BP, rechecked on other arm and diastolic BP WNL. Tele-NSR. Non-productive congested cough. NPO since 0000 except sips with meds and ice chips. Procedure scheduled for 1700 today. R PIV infusing NS @ 75mL/hr. BHUMI and walker.

## 2017-08-14 NOTE — PLAN OF CARE
Problem: Goal Outcome Summary  Goal: Goal Outcome Summary  Outcome: No Change  A/ox4, lethargic at times, remained in bed entire shift except to use restroom. VSS. C/o of mid-back pain, PRN Oxycodone given x1, provided slight relief. C/o of headache, PRN fioricet given x1, ineffective. Currently on 1L of O2, stating in mid 90's. Non-productive congested cough, still need sputum culture. NPO since midnight except sips of water or ice chips with medications. BMx1 (soft) this shift and voiding adequately. PIV infusing NS @ 75mL/hr. Plan for L vasts this afternoon at approximately 1700. Pre-op check list completed. Continue to monitor.

## 2017-08-15 ENCOUNTER — APPOINTMENT (OUTPATIENT)
Dept: GENERAL RADIOLOGY | Facility: CLINIC | Age: 62
DRG: 853 | End: 2017-08-15
Attending: PHYSICIAN ASSISTANT
Payer: MEDICARE

## 2017-08-15 LAB
ANION GAP SERPL CALCULATED.3IONS-SCNC: 6 MMOL/L (ref 3–14)
BACTERIA SPEC CULT: NO GROWTH
BACTERIA SPEC CULT: NO GROWTH
BUN SERPL-MCNC: 7 MG/DL (ref 7–30)
CALCIUM SERPL-MCNC: 8.3 MG/DL (ref 8.5–10.1)
CHLORIDE SERPL-SCNC: 100 MMOL/L (ref 94–109)
CO2 SERPL-SCNC: 30 MMOL/L (ref 20–32)
COPATH REPORT: NORMAL
CREAT SERPL-MCNC: 0.51 MG/DL (ref 0.52–1.04)
ERYTHROCYTE [DISTWIDTH] IN BLOOD BY AUTOMATED COUNT: 14.3 % (ref 10–15)
GFR SERPL CREATININE-BSD FRML MDRD: ABNORMAL ML/MIN/1.7M2
GLUCOSE BLDC GLUCOMTR-MCNC: 101 MG/DL (ref 70–99)
GLUCOSE SERPL-MCNC: 87 MG/DL (ref 70–99)
HCT VFR BLD AUTO: 28.7 % (ref 35–47)
HGB BLD-MCNC: 9.2 G/DL (ref 11.7–15.7)
KOH PREP SPEC: NORMAL
Lab: NORMAL
Lab: NORMAL
MCH RBC QN AUTO: 30.4 PG (ref 26.5–33)
MCHC RBC AUTO-ENTMCNC: 32.1 G/DL (ref 31.5–36.5)
MCV RBC AUTO: 95 FL (ref 78–100)
MICRO REPORT STATUS: NORMAL
PLATELET # BLD AUTO: 176 10E9/L (ref 150–450)
POTASSIUM SERPL-SCNC: 3.7 MMOL/L (ref 3.4–5.3)
RBC # BLD AUTO: 3.03 10E12/L (ref 3.8–5.2)
SODIUM SERPL-SCNC: 136 MMOL/L (ref 133–144)
SPECIMEN SOURCE: NORMAL
WBC # BLD AUTO: 2.3 10E9/L (ref 4–11)

## 2017-08-15 PROCEDURE — A9270 NON-COVERED ITEM OR SERVICE: HCPCS | Mod: GY | Performed by: PHYSICIAN ASSISTANT

## 2017-08-15 PROCEDURE — 36415 COLL VENOUS BLD VENIPUNCTURE: CPT | Performed by: PHYSICIAN ASSISTANT

## 2017-08-15 PROCEDURE — 25000125 ZZHC RX 250: Performed by: PHYSICIAN ASSISTANT

## 2017-08-15 PROCEDURE — 12000007 ZZH R&B INTERMEDIATE

## 2017-08-15 PROCEDURE — 80048 BASIC METABOLIC PNL TOTAL CA: CPT | Performed by: PHYSICIAN ASSISTANT

## 2017-08-15 PROCEDURE — 25000132 ZZH RX MED GY IP 250 OP 250 PS 637: Mod: GY | Performed by: PHYSICIAN ASSISTANT

## 2017-08-15 PROCEDURE — 85027 COMPLETE CBC AUTOMATED: CPT | Performed by: PHYSICIAN ASSISTANT

## 2017-08-15 PROCEDURE — A9270 NON-COVERED ITEM OR SERVICE: HCPCS | Mod: GY | Performed by: INTERNAL MEDICINE

## 2017-08-15 PROCEDURE — 25000132 ZZH RX MED GY IP 250 OP 250 PS 637: Mod: GY | Performed by: INTERNAL MEDICINE

## 2017-08-15 PROCEDURE — 71010 XR CHEST PORT 1 VW: CPT

## 2017-08-15 PROCEDURE — 99232 SBSQ HOSP IP/OBS MODERATE 35: CPT | Performed by: INTERNAL MEDICINE

## 2017-08-15 PROCEDURE — 00000146 ZZHCL STATISTIC GLUCOSE BY METER IP

## 2017-08-15 PROCEDURE — 25000128 H RX IP 250 OP 636: Performed by: INTERNAL MEDICINE

## 2017-08-15 RX ORDER — BUTALBITAL, ACETAMINOPHEN, CAFFEINE AND CODEINE PHOSPHATE 50; 325; 40; 30 MG/1; MG/1; MG/1; MG/1
2 CAPSULE ORAL 3 TIMES DAILY
Status: DISCONTINUED | OUTPATIENT
Start: 2017-08-15 | End: 2017-08-19 | Stop reason: HOSPADM

## 2017-08-15 RX ORDER — OXYCODONE HCL 40 MG/1
80 TABLET, FILM COATED, EXTENDED RELEASE ORAL EVERY 6 HOURS
Status: DISCONTINUED | OUTPATIENT
Start: 2017-08-15 | End: 2017-08-19 | Stop reason: HOSPADM

## 2017-08-15 RX ORDER — OXYCODONE HYDROCHLORIDE 15 MG/1
30 TABLET ORAL
COMMUNITY

## 2017-08-15 RX ADMIN — OXYCODONE HYDROCHLORIDE 15 MG: 5 TABLET ORAL at 17:54

## 2017-08-15 RX ADMIN — OMEPRAZOLE 40 MG: 20 CAPSULE, DELAYED RELEASE ORAL at 07:59

## 2017-08-15 RX ADMIN — GABAPENTIN 800 MG: 800 TABLET, FILM COATED ORAL at 15:56

## 2017-08-15 RX ADMIN — PIPERACILLIN SODIUM,TAZOBACTAM SODIUM 3.38 G: 3; .375 INJECTION, POWDER, FOR SOLUTION INTRAVENOUS at 13:59

## 2017-08-15 RX ADMIN — HYDROMORPHONE HYDROCHLORIDE 1 MG: 1 INJECTION, SOLUTION INTRAMUSCULAR; INTRAVENOUS; SUBCUTANEOUS at 07:14

## 2017-08-15 RX ADMIN — OXYCODONE HYDROCHLORIDE 15 MG: 5 TABLET ORAL at 11:42

## 2017-08-15 RX ADMIN — METHYLPHENIDATE HYDROCHLORIDE 20 MG: 10 TABLET ORAL at 17:54

## 2017-08-15 RX ADMIN — GUAIFENESIN AND DEXTROMETHORPHAN HYDROBROMIDE 1 TABLET: 600; 30 TABLET, EXTENDED RELEASE ORAL at 20:07

## 2017-08-15 RX ADMIN — CALCIUM CARBONATE (ANTACID) CHEW TAB 500 MG 1000 MG: 500 CHEW TAB at 21:11

## 2017-08-15 RX ADMIN — IBUPROFEN 600 MG: 600 TABLET ORAL at 17:54

## 2017-08-15 RX ADMIN — HYDROMORPHONE HYDROCHLORIDE 1 MG: 1 INJECTION, SOLUTION INTRAMUSCULAR; INTRAVENOUS; SUBCUTANEOUS at 13:59

## 2017-08-15 RX ADMIN — DIAZEPAM 5 MG: 5 TABLET ORAL at 15:57

## 2017-08-15 RX ADMIN — IBUPROFEN 600 MG: 600 TABLET ORAL at 14:21

## 2017-08-15 RX ADMIN — METHYLPHENIDATE HYDROCHLORIDE 20 MG: 10 TABLET ORAL at 13:59

## 2017-08-15 RX ADMIN — HYDROMORPHONE HYDROCHLORIDE 1.5 MG: 1 INJECTION, SOLUTION INTRAMUSCULAR; INTRAVENOUS; SUBCUTANEOUS at 22:59

## 2017-08-15 RX ADMIN — HYDROMORPHONE HYDROCHLORIDE 1 MG: 1 INJECTION, SOLUTION INTRAMUSCULAR; INTRAVENOUS; SUBCUTANEOUS at 20:08

## 2017-08-15 RX ADMIN — BACITRACIN: 500 OINTMENT TOPICAL at 08:04

## 2017-08-15 RX ADMIN — METHYLPHENIDATE HYDROCHLORIDE 20 MG: 10 TABLET ORAL at 10:11

## 2017-08-15 RX ADMIN — OXYCODONE HYDROCHLORIDE 15 MG: 5 TABLET ORAL at 05:35

## 2017-08-15 RX ADMIN — HYDROXYZINE HYDROCHLORIDE 50 MG: 25 TABLET ORAL at 08:00

## 2017-08-15 RX ADMIN — OXYCODONE HYDROCHLORIDE 10 MG: 5 TABLET ORAL at 14:39

## 2017-08-15 RX ADMIN — GABAPENTIN 800 MG: 800 TABLET, FILM COATED ORAL at 08:00

## 2017-08-15 RX ADMIN — BUPROPION HYDROCHLORIDE 300 MG: 300 TABLET, FILM COATED, EXTENDED RELEASE ORAL at 08:00

## 2017-08-15 RX ADMIN — IBUPROFEN 600 MG: 600 TABLET ORAL at 21:13

## 2017-08-15 RX ADMIN — OXYCODONE HYDROCHLORIDE 80 MG: 40 TABLET, FILM COATED, EXTENDED RELEASE ORAL at 15:56

## 2017-08-15 RX ADMIN — HYDROMORPHONE HYDROCHLORIDE 1 MG: 1 INJECTION, SOLUTION INTRAMUSCULAR; INTRAVENOUS; SUBCUTANEOUS at 04:47

## 2017-08-15 RX ADMIN — GABAPENTIN 800 MG: 800 TABLET, FILM COATED ORAL at 21:16

## 2017-08-15 RX ADMIN — PIPERACILLIN SODIUM,TAZOBACTAM SODIUM 3.38 G: 3; .375 INJECTION, POWDER, FOR SOLUTION INTRAVENOUS at 07:56

## 2017-08-15 RX ADMIN — OXYCODONE HYDROCHLORIDE 80 MG: 40 TABLET, FILM COATED, EXTENDED RELEASE ORAL at 07:58

## 2017-08-15 RX ADMIN — DIAZEPAM 5 MG: 5 TABLET ORAL at 07:59

## 2017-08-15 RX ADMIN — METHYLPHENIDATE HYDROCHLORIDE 20 MG: 10 TABLET ORAL at 07:08

## 2017-08-15 RX ADMIN — BUTALBITAL, ACETAMINOPHEN, CAFFEINE, AND CODEINE PHOSPHATE 2 CAPSULE: 30; 50; 40; 325 CAPSULE ORAL at 15:56

## 2017-08-15 RX ADMIN — THERA TABS 1 TABLET: TAB at 08:00

## 2017-08-15 RX ADMIN — TIZANIDINE 2 MG: 2 TABLET ORAL at 20:07

## 2017-08-15 RX ADMIN — SERTRALINE HYDROCHLORIDE 150 MG: 100 TABLET ORAL at 07:58

## 2017-08-15 RX ADMIN — PIPERACILLIN SODIUM,TAZOBACTAM SODIUM 3.38 G: 3; .375 INJECTION, POWDER, FOR SOLUTION INTRAVENOUS at 20:07

## 2017-08-15 RX ADMIN — HYDROXYZINE HYDROCHLORIDE 50 MG: 25 TABLET ORAL at 15:56

## 2017-08-15 RX ADMIN — OXYCODONE HYDROCHLORIDE 15 MG: 5 TABLET ORAL at 02:25

## 2017-08-15 RX ADMIN — TIZANIDINE 2 MG: 2 TABLET ORAL at 11:35

## 2017-08-15 RX ADMIN — IBUPROFEN 600 MG: 600 TABLET ORAL at 07:59

## 2017-08-15 RX ADMIN — PIPERACILLIN SODIUM,TAZOBACTAM SODIUM 3.38 G: 3; .375 INJECTION, POWDER, FOR SOLUTION INTRAVENOUS at 02:06

## 2017-08-15 RX ADMIN — HYDROMORPHONE HYDROCHLORIDE 1 MG: 1 INJECTION, SOLUTION INTRAMUSCULAR; INTRAVENOUS; SUBCUTANEOUS at 00:46

## 2017-08-15 RX ADMIN — DIAZEPAM 5 MG: 5 TABLET ORAL at 21:13

## 2017-08-15 RX ADMIN — OXYCODONE HYDROCHLORIDE 80 MG: 40 TABLET, FILM COATED, EXTENDED RELEASE ORAL at 21:14

## 2017-08-15 RX ADMIN — ZOLPIDEM TARTRATE 10 MG: 5 TABLET, FILM COATED ORAL at 21:23

## 2017-08-15 RX ADMIN — BUTALBITAL, ACETAMINOPHEN, CAFFEINE, AND CODEINE PHOSPHATE 2 CAPSULE: 30; 50; 40; 325 CAPSULE ORAL at 21:13

## 2017-08-15 RX ADMIN — HYDROXYZINE HYDROCHLORIDE 50 MG: 25 TABLET ORAL at 21:16

## 2017-08-15 RX ADMIN — GUAIFENESIN AND DEXTROMETHORPHAN HYDROBROMIDE 1 TABLET: 600; 30 TABLET, EXTENDED RELEASE ORAL at 07:59

## 2017-08-15 NOTE — PLAN OF CARE
Problem: Goal Outcome Summary  Goal: Goal Outcome Summary  Outcome: Improving  Pt AVSS sats 98% on 2liter. Lungs coarse on left. Pain control with oxycodone and dilaudid for breakthrough. Chest tubes x 3 patent. No air leak. Up with 1 assist to BSC. Voided.

## 2017-08-15 NOTE — PROVIDER NOTIFICATION
LEXI bledsoe PA-C @ 22:05 on 8/14/17--Hydromorphone 1 to 1.5 mg IV q1h prn for breakthrough pain.  Start with 1 mg and titrate up slowly.  Reyna Garber RPH

## 2017-08-15 NOTE — PROGRESS NOTES
THORACIC SURGERY POD #1 s/p left VATS and decortication for loculated parapneumonic effusion.     Doing OK.  Pain consult pending.   Satting >98% on 2L NC.   Laying in bed.  Refusing to get up to chair due to prior back injury.  No air leaks.     I/O: 653 mL from 3 CTs (chest tubes irrigated prior to [facility] connection, so this accounts for some of the initial significant output).     Temp:  [97.3  F (36.3  C)-99.7  F (37.6  C)] 98.6  F (37  C)  Pulse:  [80] 80  Heart Rate:  [70-84] 77  Resp:  [16-17] 16  BP: ()/(47-73) 104/56  SpO2:  [92 %-99 %] 98 %  Incision c/d/i, surrounding induration without erythema, crepitus, or fluctuance.   Chest tubes to suction with serosang output.    Laying in left lat decub.  Sedated but reporting severe pain.    CTs to suction.    Ambulate!!!  Aggressive pulmonary toilet.   OK to sit very upright in bed for meals but needs to walk more frequently if she's not sitting up in chair.    Pathology pending.    JAVON WOODY MS, PA-C  [facility]  office: [phone]  cell: [phone]  Available M-F, 7AM-5PM

## 2017-08-15 NOTE — ANESTHESIA CARE TRANSFER NOTE
Patient: Carlie Parkinsonjustotalat    Procedure(s):  removal if intrapleural fibrin debris, drainage of fluid parapneumonic effusion - Wound Class: I-Clean   - Wound Class: I-Clean    Diagnosis: Pleural Effusion  Diagnosis Additional Information: No value filed.    Anesthesia Type:   General, ETT     Note:  Airway :Face Mask  Patient transferred to:PACU  Comments: Extubation / Transfer of Care Note:    Carlie Myers    Spontaneous respirations. Strong and purposeful movement. Suctioned. Extubated awake. O2 via FM.    In PACU. Monitors on. VSS. Report to RN.    8/14/2017    Deepa Jung CRNA          Vitals: (Last set prior to Anesthesia Care Transfer)    CRNA VITALS  8/14/2017 1841 - 8/14/2017 1914      8/14/2017             Pulse: 100    SpO2: 100 %    Resp Rate (set): 10                Electronically Signed By: RAFFY Saavedra CRNA  August 14, 2017  7:14 PM

## 2017-08-15 NOTE — PLAN OF CARE
Problem: Goal Outcome Summary  Goal: Goal Outcome Summary  PT: Orders received.  Attempted to see, pt reports she can not work with PT until talking to MD today.  Declined any activity at this time even with encouragement.

## 2017-08-15 NOTE — PLAN OF CARE
Problem: Individualization  Goal: Patient Preferences  Outcome: No Change  VSS.  Pt continues to have on going pain control issues, despite pain medication adjustments from the hospitalist and pharmacist.  Pt refused to walk or sit in the chair today, pt did get up to the commode to void and then remained the rest of the day in bed.  This RN tried multiple times to get the pt to walk, walker obtained and at the bed side.  Pt refused physical therapy.  CT x are intact, small amount of crepitus noted, but no air leak noted.  See computer for CT outputs.

## 2017-08-15 NOTE — BRIEF OP NOTE
[facility] Brief Operative Note    Pre-operative diagnosis: Pleural Effusion   Post-operative diagnosis Loculated left parapneumonic effusion   Procedure: Procedure(s):  removal if intrapleural fibrin debris, drainage of fluid parapneumonic effusion - Wound Class: I-Clean   - Wound Class: I-Clean   Surgeon(s): Surgeon(s) and Role:     * Du Ramesh MD - Primary     * Tonya Becker PA-C - Assisting  Mary Gilbert PA-C   Estimated blood loss: 100 mL    Specimens:   ID Type Source Tests Collected by Time Destination   1 : left pleural debris Tissue Pleural/Thoracic Cavity AFB CULTURE NON BLOOD, AFB STAIN NON BLOOD, ANAEROBIC BACTERIAL CULTURE, FUNGUS CULTURE, KOH PREP, TISSUE CULTURE AEROBIC BACTERIAL Du Ramesh MD 8/14/2017  6:13 PM       Findings: Loculated parapneumonic effusion.  No obvious purulence.  +fibrin debris, some sent for analysis.  \Await stains and cultures.    3 chest tubes placed.

## 2017-08-15 NOTE — CONSULTS
"Inpatient Pain Management Service: Consultation      DATE OF CONSULT: August 15, 2017     REASON FOR PAIN CONSULTATION:  Carlie Myers is a 61 year old female I am seeing in consultation at the request of ROSY Dove for evaluation and recommendations for pt's acute on chronic pain.  Patient with significant chronic pain needs - currently on high doses oxycontin with oxycodone and prn dilaudid now s/p vats with chest tubes. Per patient, she is s/p 5 different back fusions, 3 neck fusions, and bilateral knee replacements.  Per patient, due to the severity of her back issues, she has been left with a \"dead\" foot, has neuropathies, and has difficulty with urinary continence.  Carlie said that at her last ortho appt, they told her spine is \"very bad\" and she may need a 9-level fusion in the future.      TIME SPENT: 30 minutes including 15 minutes of face-to-face time counseling Carlie Myers about their pain management treatment options, and determining pain medication plan.    --------------------------------------------------------------------------------------------------  PAIN ASSESSMENT     PAIN DESCRIPTION:   Location(s):  Back, knees, neck, currently pain is most acute at chest tube insertion sites  Duration: constant  Pain intensity: rating as an 8 out of 10.  At home, pain is typically a 7.  Quality of the pain:   Aching, throbbing, stabbing-- also has neuropathy in foot.    Aggravating factors:  Movement-- cannot lay on back  Relieving factors: medications, immobility    HEALTH & LIFESTYLE PRACTICES:   Tobacco:  reports that she has been smoking.  She has been smoking about 1.00 pack per day. She does not have any smokeless tobacco history on file.  Alcohol:  reports that she does not drink alcohol.  Illicit drugs:  has no drug history on file.    PAST MEDICAL AND PSYCHIATRIC HISTORY:    Past Medical History:   Diagnosis Date     Arthritis        PAST SURGICAL HISTORY:   Past Surgical History: " "  Procedure Laterality Date     CARDIAC SURGERY      mitral valve prolapse     HEAD & NECK SURGERY      neck and back fusion     ORTHOPEDIC SURGERY      knee     THORACOSCOPIC DECORTICATION LUNG Left 8/14/2017    Procedure: THORACOSCOPIC DECORTICATION LUNG;  removal if intrapleural fibrin debris, drainage of fluid parapneumonic effusion;  Surgeon: Du Ramesh MD;  Location: [facility] OR     THORACOSCOPY Left 8/14/2017    Procedure: THORACOSCOPY;;  Surgeon: Du Ramesh MD;  Location: [facility] OR       CAPA (Clinically Aligned Pain Assessment)  Comfort (How is your pain?):  Somewhat tolerable  Change in Pain (Since your last medication/intervention?): Getting worse, (after CTs inserted, pain worsened)  Pain Control (How are your pain treatments working?):  Working somewhat.    Functioning (Are you able to do activities to get better?) : Pain keeps me from doing most of what I need to do  Sleep (Does your pain management allow you to sleep or rest?): Awake with occasional pain     CURRENT FUNCTIONAL STATUS:  Change:      staying the same  Oral intake:     Regular  Bowel function:    Normal  Activity level:     Limited bed mobility  Sleep:      Has not been sleeping well  Mood:      Emotional, is depressed about chronic back problems-- is very frightened about the need for potentially a \"'9-level\" spine fusion in the future      --------------------------------------------------------------------------------------------------  PAIN MEDICATION ASSESSMENT    ALLERGIES:  No Known Allergies     PREADMISSION PAIN MEDICATIONS:    Prior to Admission medications    Medication Sig Start Date End Date Taking? Authorizing Provider   OXYCODONE HCL PO Take 15 mg by mouth every 4 hours as needed   Yes Unknown, Entered By History   Sertraline HCl (ZOLOFT PO) Take 150 mg by mouth daily   Yes Unknown, Entered By History   BuPROPion HCl (WELLBUTRIN XL PO) Take 300 mg by mouth daily   Yes Unknown, Entered By History "   Zolpidem Tartrate (AMBIEN CR PO) Take 12.5 mg by mouth At Bedtime   Yes Unknown, Entered By History   OxyCODONE HCl (OXYCONTIN PO) Take 80 mg by mouth 4 times daily    Yes Reported, Patient   DiazePAM (VALIUM PO) Take 5 mg by mouth 3 times daily   Yes Reported, Patient   GABAPENTIN PO Take 800 mg by mouth 3 times daily   Yes Reported, Patient   HYDROXYZINE PAMOATE PO Take 50 mg by mouth every 8 hours   Yes Reported, Patient   IBUPROFEN PO Take 600 mg by mouth 4 times daily   Yes Reported, Patient   TiZANidine HCl (ZANAFLEX PO) Take 2 mg by mouth 2 times daily   Yes Reported, Patient   etanercept (ENBREL) 50 MG/ML injection Inject 50 mg Subcutaneous once a week On Wednesdays.    Reported, Patient   butalbital-APAP-caffeine-codeine (FIORICET WITH CODEINE) -68-30 MG per capsule Take 2 capsules by mouth every 6 hours as needed     Reported, Patient       CURRENT INPATIENT PAIN MEDICATIONS:      1) Tylenol 650 mg PO Q4H PRN mild pain  2) Oxycontin 80 mg PO QID  3) Oxycodone IR 10-15 mg PO Q3H PRN severe pain  4) Gabapentin 800 mg PO TID  5) Fioricet 2 caps Q6 PRN  6) Dilaudid IV 1-1.5 mg IV Q1H PRN (recently increased)  7) Tizanidine 2 mg PO BID  8) Ibuprofen 600 mg QID    Of note, Carlie is also taking multiple CNS meds including:  scheduled Valium 5 mg TID, Wellbutrin 300 mg XL daily, ritalin 20 mg po QID, Zoloft 150 mg daily, and she takes Ambien 10 mg QHS prn insomnia      PAST PAIN TREATMENT:   Has tried multiple therapies in the past:  Morphine, fentanyl patches, pain pump, methadone    LABS THAT CAN AFFECT PAIN MEDICATION CLEARANCE:   Lab Results   Component Value Date    BUN 7 08/15/2017     Lab Results   Component Value Date    CR 0.51 08/15/2017     Lab Results   Component Value Date    AST 31 08/09/2017     Lab Results   Component Value Date    ALT 53 08/09/2017       VITAL SIGNS:    B/P: 104/56, T: 98.6, P: 80, R: 16      ASSESSMENT:   Carlie's pain level is 8/10 this AM I did have a lengthy  conversation about all of the pain treatments she's tried in the past:  Fentanyl patches (caused her to feel like passing out), methadone (pain wasn't controlled), morphine (made her loopy).  Despite her current pain level, she did seem to understand that we are somewhat limited with our options.  We discussed possibly increasing her IV dilaudid dose-- or we could start a PCA if she felt that her pain was intolerable.  She didn't feel that dilaudid works very well for her-- I asked her was has worked in the past, and she said fentanyl.  I told her that given the very short half-life of fentanyl, that this would not probably be a good option unless we started a fentanyl PCA so she could frequently redose as needed.  She seemed to understand that dilaudid is our best option at this point.  She did request that I schedule her fioricet (done) and that she be woken up at 4 am for a narcotic dose--- she stated that AMs are absolutely awful if she is not woken up to be given her pain medications at 3 or 4am.        TREATMENT RECOMMENDATIONS/PLAN:   1) Continue current dilaudid IV dose of 1-1.5 mg IV Q1H PRN-- I agree with the recent dose increase.  If her pain continues to increase, we could increase to 1-2 mg doses, or we could switch her to a dilaudid or fentanyl PCA.  After our discussion today, Carlie seemed ok with continuing to do dilaudid 1-1.5 mg IV Q1H PRN for now.    2) Carlie wants her Fioricet scheduled --  (That's how she takes it at home for her chronic tension headaches secondary to her multiple neck fusions.)  I've changed her Fioricet to 2 capsules TID (cannot exceed 6 caps per 24 hours)  3) Per Carlie, her AM pain is extremely bad-- she said she needs to have pain medications given to her between 3-4 AM so that she isn't without pain medications all night.  I've changed her oxycontin to Q6H instead of QID so that she gets better 24 hour coverage and so she receives one of her doses at 4am.      Sharad ROBIN  Merary, Pharm.D.  August 15, 2017, 9:28 AM  Inpatient Pain Management Service

## 2017-08-15 NOTE — PROGRESS NOTES
[facility]    Hospitalist Progress Note    Date of Service (when I saw the patient): 08/15/2017    Assessment & Plan   Carlie Myers is a 61-year-old white female with a history of nicotine dependence, neuropathy, psoriatic arthritis, chronic pain syndrome, on multiple narcotics, mitral valve prolapse, admitted for CAP. Prolonged hospital stay due to complicated pleural effusion.      Sepsis, complicated parapneumonic effusion s/p VATS and decortication 8/14:   Diminished cough/inspiratory effort from chronic back pain. UA on admission negative. US 8/8 with small left effusion. Thoracentesis 8/11 with 20ml exudative fluid removed. Strep pneumo and legionella urine Ag negative. CT chest 8/12 with loculated left effusion. Rocephin/Zithromax discontinued and started Zosyn 8/12. RUQ US negative. Underwent above procedure by Dr. Ramesh.  - WBC leukopenic likely reactive from sepsis.  - ID following, c/w Zosyn, remains afebrile  - bld and pleural cx negative thus far, cytology negative  - OR cultures pending  - IVF d/c'ed  - Encourage IS use  - Wean oxygen as able for sats 92%.   - Mucinex BID, prn tessalon      Hypochloremic hyponatremia, resolved:  Na normal on admission, down to 131 on 8/10. Patient with reduced oral intake due to lack of appetite. Given fluid bolus 8/12 for hypotension. Na normal with IVF.  - monitor      Acute on chronic back pain  Anxiety:   [PTA on gabapentin, oxycodone, OxyContin, valium, ibuprofen, tizanidine, Fioricet, Wellbutrin, Zolpidem, Sertraline, Hydroxyzine, Ritalin].   Pain exacerbated by chest tubes, pneumonia. Overnight oximetry needing 1lpm conts, consider OP sleep study if remains hypoxic at discharge time.  - Appreciate pain consult (reduced Fioricet to TID)  - c/w home narcotic and anti-anxiety regimen.  - prn dilaudid and oxycodone for breakthrough pain  - PT, encourage ambulation      Hypertension:  - Holding PTA Maxzide given hypotension      Hypokalemia:  - K  replacement protocol    DVT Prophylaxis: Pneumatic Compression Devices  Code Status: Full Code    Disposition: Expected discharge in 2-3 days once chest tubes removed and abx regimen finalized.    Charlotte Fu MD    Interval History   Continues to have acute on chronic back pain. Feels staff are not understanding her baseline limitations in mobility and asking her to move more than what she normally does at home. She feels helpless overall. Some relief with  visits.    -Data reviewed today: I reviewed all new labs and imaging results over the last 24 hours. I personally reviewed the chest x-ray image(s) showing persistent left lower lob consolidation/effusion .    Physical Exam   Temp: 98.8  F (37.1  C) Temp src: Oral BP: 116/68 Pulse: 80 Heart Rate: 94 Resp: 16 SpO2: 93 % O2 Device: Nasal cannula Oxygen Delivery: 2 LPM  Vitals:    08/10/17 0546   Weight: 86.5 kg (190 lb 11.2 oz)     Vital Signs with Ranges  Temp:  [97.7  F (36.5  C)-99  F (37.2  C)] 98.8  F (37.1  C)  Pulse:  [80] 80  Heart Rate:  [70-94] 94  Resp:  [16-17] 16  BP: ()/(47-73) 116/68  SpO2:  [88 %-99 %] 93 %  I/O last 3 completed shifts:  In: 2646 [P.O.:200; I.V.:2446]  Out: 1353 [Urine:600; Blood:100; Chest Tube:653]    Constitutional: Awake, alert, cooperative, no apparent distress when laying still, pain with coughing  Respiratory: Coarse breath sounds LLL, right side clear, reduced inspiratory effort from pain  Cardiovascular: Regular rate and rhythm, normal S1 and S2, and no murmur noted  GI: Hypoactive bowel sounds, soft, non-distended, non-tender  Skin/Integumen: No rashes, no cyanosis, no edema    Medications        oxyCODONE (OxyCONTIN) 12 hr tablet 80 mg  80 mg Oral Q6H     butalbital-APAP-caffeine-codeine  2 capsule Oral TID     sodium chloride (PF)  3 mL Intracatheter Q8H     bacitracin   Topical TID     senna-docusate  1-2 tablet Oral BID     piperacillin-tazobactam  3.375 g Intravenous Q6H      dextromethorphan-guaiFENesin  1 tablet Oral BID     diazepam (VALIUM) tablet 5 mg  5 mg Oral TID     gabapentin  800 mg Oral TID     hydrOXYzine  50 mg Oral TID     ibuprofen (ADVIL/MOTRIN) tablet 600 mg  600 mg Oral 4x Daily     methylphenidate  20 mg Oral 4x Daily     multivitamin, therapeutic  1 tablet Oral Daily     omeprazole (priLOSEC) CR capsule 40 mg  40 mg Oral QAM     sertraline (ZOLOFT) tablet 150 mg  150 mg Oral Daily     tiZANidine (ZANAFLEX) tablet 2 mg  2 mg Oral BID     buPROPion (WELLBUTRIN XL) 24 hr tablet 300 mg  300 mg Oral Daily       Data     Recent Labs  Lab 08/15/17  0720 08/14/17  0708 08/13/17  1140 08/13/17  0653  08/11/17  0659  08/09/17  0717   WBC 2.3* 2.5* 2.5* 2.6*  --   --   < > 6.9   HGB 9.2* 11.4* 10.7* 11.1*  --   --   < > 11.4*   MCV 95 94 94 95  --   --   < > 95    174 188 191  --   --   < > 293   INR  --   --  1.09  --   --   --   --   --      --  135 134  < > 129*  < > 135   POTASSIUM 3.7  --  3.8 3.5  < > 3.3*  < > 3.4   CHLORIDE 100  --  98 96  < > 90*  < > 93*   CO2 30  --  29 32  < > 32  < > 35*   BUN 7  --  13 14  < > 12  < > 8   CR 0.51*  --  0.74 0.66  < > 0.62  < > 0.58   ANIONGAP 6  --  8 6  < > 7  < > 7   BENNY 8.3*  --  8.6 8.7  < > 9.1  < > 9.1   GLC 87  --  92 94  < > 107*  < > 97   ALBUMIN  --   --   --   --   --   --   --  2.4*   PROTTOTAL  --   --   --   --   --  7.5  --  7.2   BILITOTAL  --   --   --   --   --   --   --  0.2   ALKPHOS  --   --   --   --   --   --   --  114   ALT  --   --   --   --   --   --   --  53*   AST  --   --   --   --   --   --   --  31   TROPI  --   --   --   --   --   --   --  <0.015The 99th percentile for upper reference range is 0.045 ug/L.  Troponin values in the range of 0.045 - 0.120 ug/L may be associated with risks of adverse clinical events.   < > = values in this interval not displayed.    Recent Results (from the past 24 hour(s))   XR Chest Port 1 View    Narrative    XR CHEST PORT 1 VW 8/14/2017 7:35  PM    COMPARISON: 8/11/2017    HISTORY: Postop chest tube placement.      Impression    IMPRESSION: 3 left-sided chest tubes are seen. No pneumothorax. Likely  very small left pleural effusion. Left basilar consolidation is seen.  Platelike atelectasis at the right base. No pneumothorax on either  side.    JOSÉ MIGUEL GUY   XR Chest Port 1 View    Narrative    XR CHEST PORT 1 VW 8/15/2017 5:53 AM    COMPARISON: 8/14/2017    HISTORY: Postprocedural pleural effusion.      Impression    IMPRESSION: 3 left-sided chest tubes remain in place. Possible trace  left basilar pleural effusion with associated  atelectasis/consolidation is not significant change. Platelike  atelectasis in the right mid to lower lung again seen. No pneumothorax  identified on either side.    JOSÉ MIGUEL GUY

## 2017-08-15 NOTE — PROGRESS NOTES
Spiritual Assessment Progress Note  [facility]      PRIMARY FOCUS:      Emotional/spiritual/Zoroastrianism distress    Support for coping    ILLNESS CIRCUMSTANCES:    Reviewed documentation. Reflective conversation shared with Carlie which integrated elements of illness and family narratives.         Context of Serious Illness/Symptom(s) - Pt was here to have surgery to drain fluid from her lung.  However, she has other medical issues with her spine and back.  When I came to the room, she told me her whole story about pain, surgeries and disability.  She talked almost non-stop for 35 minutes.  I heard her frustration with being in the hospital and not getting enough rest.  I did not hear any hope in this conversation.  The staff wanted to get her up and walking so I ended the conversation before she was finished.  We said a short prayer at the end.    Resources for Support - children and       DISTRESS:      Emotional/ ExistentialRelational Distress - long and chronic pain.    Spiritual/Bahai Distress - not discussed    Social/Cultural/EconomicDistress - not discussed.       SPIRITUAL/Jewish (Coping):      Religious/[place] - Temple    Spiritual Practice(s) - used to go to [facility]ism at [facility] when she could tolerate sitting that long.    Emotional/Existential/ Relationall/Connections - not discussed.      GOALS OF CARE:    Goals of Care - getting pain under control.    Meaning/Sense-Making - not discussed.      PLAN: I will follow up tomorrow to finish conversation.      Renu Kline  Chaplain Resident  Pager [phone]

## 2017-08-15 NOTE — PLAN OF CARE
Problem: Goal Outcome Summary  Goal: Goal Outcome Summary  Outcome: Improving  VSS. A/Ox4, lethargic. CT's x3 to -20 suction, moderate SS drainage, dressing changed, no AL, crepitus present. Pain controlled with several scheduled pain meds, see MAR, PRN Fioricet also. Due to dangle. Clear liquid diet, tolerating sips and ice chips. BS hypo, Flatus -. Due to void. LS diminished in bases on left side, crackles in upper left, clear on right side. IS and acapella encouraged when more awake.

## 2017-08-15 NOTE — ANESTHESIA POSTPROCEDURE EVALUATION
Patient: Carlie Myers    Procedure(s):  removal if intrapleural fibrin debris, drainage of fluid parapneumonic effusion - Wound Class: I-Clean   - Wound Class: I-Clean    Diagnosis:Pleural Effusion  Diagnosis Additional Information: No value filed.    Anesthesia Type:  General, ETT    Note:  Anesthesia Post Evaluation    Patient location during evaluation: PACU  Patient participation: Able to fully participate in evaluation  Level of consciousness: awake and alert  Pain management: adequate  Cardiovascular status: acceptable  Respiratory status: acceptable  Hydration status: acceptable  PONV: none     Anesthetic complications: None          Last vitals:  Vitals:    08/14/17 1951 08/14/17 2003 08/14/17 2014   BP:      Pulse:      Resp:      Temp:      SpO2: 92% 93% 94%         Electronically Signed By: ARCHANA SALINAS  August 14, 2017  9:04 PM

## 2017-08-15 NOTE — PROGRESS NOTES
Care Transition Initial Assessment - RN        Met with: Patient.    DATA   Active Problems:    Sepsis (H)    Pleural effusion associated with pulmonary infection       Cognitive Status: sleepy, alert and oriented.        Contact information and PCP information verified: Yes    Lives With: spouse    Insurance concerns: No Insurance issues identified    ASSESSMENT  Patient currently receives the following services:  Daughter is her PCA, has SW and RN through [facility] services through [employer].        Identified issues/concerns regarding health management: needs pain under control  She lives in a 2 story home but accesses only the main level, has a lift chair and walker. Her daughter is her PCA and helps with assist.   PLAN  Financial costs for the patient include per insurance  Patient given options and choices for discharge n/a  Patient/family is agreeable to the plan?  N/A  Patient anticipates discharging to home with [facility] .        Patient anticipates needs for home equipment: No  Discharge Planner   Discharge Plans in progress: no  Barriers to discharge plan: currently pain control  Plan/Disposition: Home   Appointments:         Care  (CTS) will continue to follow as needed.

## 2017-08-15 NOTE — PROGRESS NOTES
[facility]  Infectious Disease Progress Note          Assessment and Plan:   IMPRESSION:   1.  Carlie Myers is a 61-year-old female with 2 weeks of cough, low-grade fever and left pleuritic chest pain, found to have left pleural fluid, possibly some infiltrate, treated as pneumonia, not resolving, including ongoing fever, now pleural tap is exudative, probably has an empyema.   2.  History of chronic pain syndrome with multiple prior spine surgeries, ongoing pain, currently being evaluated.  No real signs of spine infection.   3.  Prior bilateral knee replacements.   4.  Tobacco abuse.       RECOMMENDATIONS:   1.  Continue Zosyn. Cx pending likely to be neg on antibiotics  2.  Loculated effusion op noted, doing OK postop        Interval History:   no new complaints  feels OK,op noted, exudate, T down, cx neg so far              Medications:       sodium chloride (PF)  3 mL Intracatheter Q8H     bacitracin   Topical TID     senna-docusate  1-2 tablet Oral BID     piperacillin-tazobactam  3.375 g Intravenous Q6H     dextromethorphan-guaiFENesin  1 tablet Oral BID     diazepam (VALIUM) tablet 5 mg  5 mg Oral TID     gabapentin  800 mg Oral TID     hydrOXYzine  50 mg Oral TID     ibuprofen (ADVIL/MOTRIN) tablet 600 mg  600 mg Oral 4x Daily     methylphenidate  20 mg Oral 4x Daily     multivitamin, therapeutic  1 tablet Oral Daily     omeprazole (priLOSEC) CR capsule 40 mg  40 mg Oral QAM     oxyCODONE (OxyCONTIN) 12 hr tablet 80 mg  80 mg Oral 4x Daily     sertraline (ZOLOFT) tablet 150 mg  150 mg Oral Daily     tiZANidine (ZANAFLEX) tablet 2 mg  2 mg Oral BID     buPROPion (WELLBUTRIN XL) 24 hr tablet 300 mg  300 mg Oral Daily                  Physical Exam:   Blood pressure 104/56, pulse 80, temperature 98.6  F (37  C), temperature source Oral, resp. rate 16, weight 86.5 kg (190 lb 11.2 oz), SpO2 98 %.  Wt Readings from Last 2 Encounters:   08/10/17 86.5 kg (190 lb 11.2 oz)   10/12/13 95.3 kg  (210 lb)     Vital Signs with Ranges  Temp:  [97.3  F (36.3  C)-99.7  F (37.6  C)] 98.6  F (37  C)  Pulse:  [80] 80  Heart Rate:  [70-84] 77  Resp:  [16-17] 16  BP: ()/(47-73) 104/56  SpO2:  [92 %-99 %] 98 %    Constitutional: Awake, alert, cooperative, no apparent distress    Lungs: Congestion to auscultation bilaterally, no crackles or wheezing   Cardiovascular: Regular rate and rhythm, normal S1 and S2, and no murmur noted   Abdomen: Normal bowel sounds, soft, non-distended, non-tender   Skin: No rashes, no cyanosis, no edema   Other:           Data:   All microbiology laboratory data reviewed.  Recent Labs   Lab Test  08/15/17   0720  08/14/17   0708  08/13/17   1140   WBC  2.3*  2.5*  2.5*   HGB  9.2*  11.4*  10.7*   HCT  28.7*  35.2  32.8*   MCV  95  94  94   PLT  176  174  188     Recent Labs   Lab Test  08/15/17   0720  08/13/17   1140  08/13/17   0653   CR  0.51*  0.74  0.66     No lab results found.  Recent Labs   Lab Test  08/12/17   0912  08/12/17   0827  08/11/17   1415  08/09/17   1240  08/09/17   1235  08/07/17   0425  08/07/17   0420   CULT  No growth after 3 days  No growth after 3 days  Culture negative monitoring continues  Culture negative monitoring continues  No growth  No growth  No growth  No growth

## 2017-08-16 ENCOUNTER — APPOINTMENT (OUTPATIENT)
Dept: GENERAL RADIOLOGY | Facility: CLINIC | Age: 62
DRG: 853 | End: 2017-08-16
Attending: PHYSICIAN ASSISTANT
Payer: MEDICARE

## 2017-08-16 ENCOUNTER — APPOINTMENT (OUTPATIENT)
Dept: PHYSICAL THERAPY | Facility: CLINIC | Age: 62
DRG: 853 | End: 2017-08-16
Attending: PHYSICIAN ASSISTANT
Payer: MEDICARE

## 2017-08-16 LAB
BACTERIA SPEC CULT: NO GROWTH
ERYTHROCYTE [DISTWIDTH] IN BLOOD BY AUTOMATED COUNT: 14.1 % (ref 10–15)
GLUCOSE BLDC GLUCOMTR-MCNC: 89 MG/DL (ref 70–99)
HCT VFR BLD AUTO: 27 % (ref 35–47)
HGB BLD-MCNC: 8.8 G/DL (ref 11.7–15.7)
INTERPRETATION ECG - MUSE: NORMAL
MCH RBC QN AUTO: 30.7 PG (ref 26.5–33)
MCHC RBC AUTO-ENTMCNC: 32.6 G/DL (ref 31.5–36.5)
MCV RBC AUTO: 94 FL (ref 78–100)
PLATELET # BLD AUTO: 191 10E9/L (ref 150–450)
RBC # BLD AUTO: 2.87 10E12/L (ref 3.8–5.2)
SPECIMEN SOURCE: NORMAL
WBC # BLD AUTO: 3.3 10E9/L (ref 4–11)

## 2017-08-16 PROCEDURE — 97116 GAIT TRAINING THERAPY: CPT | Mod: GP | Performed by: PHYSICAL THERAPIST

## 2017-08-16 PROCEDURE — 97162 PT EVAL MOD COMPLEX 30 MIN: CPT | Mod: GP | Performed by: PHYSICAL THERAPIST

## 2017-08-16 PROCEDURE — 25000132 ZZH RX MED GY IP 250 OP 250 PS 637: Mod: GY | Performed by: INTERNAL MEDICINE

## 2017-08-16 PROCEDURE — A9270 NON-COVERED ITEM OR SERVICE: HCPCS | Mod: GY | Performed by: INTERNAL MEDICINE

## 2017-08-16 PROCEDURE — 97530 THERAPEUTIC ACTIVITIES: CPT | Mod: GP | Performed by: PHYSICAL THERAPIST

## 2017-08-16 PROCEDURE — 40000193 ZZH STATISTIC PT WARD VISIT: Performed by: PHYSICAL THERAPIST

## 2017-08-16 PROCEDURE — 99232 SBSQ HOSP IP/OBS MODERATE 35: CPT | Performed by: INTERNAL MEDICINE

## 2017-08-16 PROCEDURE — 00000146 ZZHCL STATISTIC GLUCOSE BY METER IP

## 2017-08-16 PROCEDURE — 25000132 ZZH RX MED GY IP 250 OP 250 PS 637: Mod: GY | Performed by: PHYSICIAN ASSISTANT

## 2017-08-16 PROCEDURE — 25000128 H RX IP 250 OP 636: Performed by: INTERNAL MEDICINE

## 2017-08-16 PROCEDURE — 12000007 ZZH R&B INTERMEDIATE

## 2017-08-16 PROCEDURE — 85027 COMPLETE CBC AUTOMATED: CPT | Performed by: INTERNAL MEDICINE

## 2017-08-16 PROCEDURE — 71010 XR CHEST PORT 1 VW: CPT

## 2017-08-16 PROCEDURE — A9270 NON-COVERED ITEM OR SERVICE: HCPCS | Mod: GY | Performed by: PHYSICIAN ASSISTANT

## 2017-08-16 PROCEDURE — 36415 COLL VENOUS BLD VENIPUNCTURE: CPT | Performed by: INTERNAL MEDICINE

## 2017-08-16 RX ORDER — OXYCODONE HYDROCHLORIDE 15 MG/1
30 TABLET ORAL
Status: DISCONTINUED | OUTPATIENT
Start: 2017-08-16 | End: 2017-08-19 | Stop reason: HOSPADM

## 2017-08-16 RX ADMIN — TIZANIDINE 2 MG: 2 TABLET ORAL at 20:37

## 2017-08-16 RX ADMIN — SERTRALINE HYDROCHLORIDE 150 MG: 100 TABLET ORAL at 08:48

## 2017-08-16 RX ADMIN — BUTALBITAL, ACETAMINOPHEN, CAFFEINE, AND CODEINE PHOSPHATE 2 CAPSULE: 30; 50; 40; 325 CAPSULE ORAL at 21:15

## 2017-08-16 RX ADMIN — PIPERACILLIN SODIUM,TAZOBACTAM SODIUM 3.38 G: 3; .375 INJECTION, POWDER, FOR SOLUTION INTRAVENOUS at 14:18

## 2017-08-16 RX ADMIN — HYDROMORPHONE HYDROCHLORIDE 1.5 MG: 1 INJECTION, SOLUTION INTRAMUSCULAR; INTRAVENOUS; SUBCUTANEOUS at 00:35

## 2017-08-16 RX ADMIN — IBUPROFEN 600 MG: 600 TABLET ORAL at 19:24

## 2017-08-16 RX ADMIN — OMEPRAZOLE 40 MG: 20 CAPSULE, DELAYED RELEASE ORAL at 08:49

## 2017-08-16 RX ADMIN — OXYCODONE HYDROCHLORIDE 80 MG: 40 TABLET, FILM COATED, EXTENDED RELEASE ORAL at 15:52

## 2017-08-16 RX ADMIN — ZOLPIDEM TARTRATE 10 MG: 5 TABLET, FILM COATED ORAL at 21:15

## 2017-08-16 RX ADMIN — HYDROMORPHONE HYDROCHLORIDE 1.5 MG: 1 INJECTION, SOLUTION INTRAMUSCULAR; INTRAVENOUS; SUBCUTANEOUS at 02:56

## 2017-08-16 RX ADMIN — TIZANIDINE 2 MG: 2 TABLET ORAL at 08:49

## 2017-08-16 RX ADMIN — BACITRACIN: 500 OINTMENT TOPICAL at 15:57

## 2017-08-16 RX ADMIN — IBUPROFEN 600 MG: 600 TABLET ORAL at 08:49

## 2017-08-16 RX ADMIN — HYDROMORPHONE HYDROCHLORIDE 1 MG: 1 INJECTION, SOLUTION INTRAMUSCULAR; INTRAVENOUS; SUBCUTANEOUS at 14:58

## 2017-08-16 RX ADMIN — DIAZEPAM 5 MG: 5 TABLET ORAL at 15:52

## 2017-08-16 RX ADMIN — GUAIFENESIN AND DEXTROMETHORPHAN HYDROBROMIDE 1 TABLET: 600; 30 TABLET, EXTENDED RELEASE ORAL at 08:36

## 2017-08-16 RX ADMIN — OXYCODONE HYDROCHLORIDE 30 MG: 15 TABLET ORAL at 19:23

## 2017-08-16 RX ADMIN — DIAZEPAM 5 MG: 5 TABLET ORAL at 08:48

## 2017-08-16 RX ADMIN — DIAZEPAM 5 MG: 5 TABLET ORAL at 21:15

## 2017-08-16 RX ADMIN — GABAPENTIN 800 MG: 800 TABLET, FILM COATED ORAL at 22:59

## 2017-08-16 RX ADMIN — OXYCODONE HYDROCHLORIDE 30 MG: 15 TABLET ORAL at 13:22

## 2017-08-16 RX ADMIN — IBUPROFEN 600 MG: 600 TABLET ORAL at 21:15

## 2017-08-16 RX ADMIN — METHYLPHENIDATE HYDROCHLORIDE 20 MG: 10 TABLET ORAL at 10:05

## 2017-08-16 RX ADMIN — THERA TABS 1 TABLET: TAB at 08:49

## 2017-08-16 RX ADMIN — GABAPENTIN 800 MG: 800 TABLET, FILM COATED ORAL at 15:53

## 2017-08-16 RX ADMIN — HYDROMORPHONE HYDROCHLORIDE 1 MG: 1 INJECTION, SOLUTION INTRAMUSCULAR; INTRAVENOUS; SUBCUTANEOUS at 20:36

## 2017-08-16 RX ADMIN — OXYCODONE HYDROCHLORIDE 30 MG: 15 TABLET ORAL at 23:04

## 2017-08-16 RX ADMIN — PIPERACILLIN SODIUM,TAZOBACTAM SODIUM 3.38 G: 3; .375 INJECTION, POWDER, FOR SOLUTION INTRAVENOUS at 20:36

## 2017-08-16 RX ADMIN — PIPERACILLIN SODIUM,TAZOBACTAM SODIUM 3.38 G: 3; .375 INJECTION, POWDER, FOR SOLUTION INTRAVENOUS at 02:40

## 2017-08-16 RX ADMIN — GUAIFENESIN AND DEXTROMETHORPHAN HYDROBROMIDE 1 TABLET: 600; 30 TABLET, EXTENDED RELEASE ORAL at 20:37

## 2017-08-16 RX ADMIN — METHYLPHENIDATE HYDROCHLORIDE 20 MG: 10 TABLET ORAL at 14:18

## 2017-08-16 RX ADMIN — OXYCODONE HYDROCHLORIDE 80 MG: 40 TABLET, FILM COATED, EXTENDED RELEASE ORAL at 21:15

## 2017-08-16 RX ADMIN — METHYLPHENIDATE HYDROCHLORIDE 20 MG: 10 TABLET ORAL at 19:24

## 2017-08-16 RX ADMIN — GABAPENTIN 800 MG: 800 TABLET, FILM COATED ORAL at 08:49

## 2017-08-16 RX ADMIN — BUPROPION HYDROCHLORIDE 300 MG: 300 TABLET, FILM COATED, EXTENDED RELEASE ORAL at 08:49

## 2017-08-16 RX ADMIN — PIPERACILLIN SODIUM,TAZOBACTAM SODIUM 3.38 G: 3; .375 INJECTION, POWDER, FOR SOLUTION INTRAVENOUS at 08:48

## 2017-08-16 RX ADMIN — OXYCODONE HYDROCHLORIDE 30 MG: 15 TABLET ORAL at 10:05

## 2017-08-16 RX ADMIN — HYDROXYZINE HYDROCHLORIDE 50 MG: 25 TABLET ORAL at 21:15

## 2017-08-16 RX ADMIN — OXYCODONE HYDROCHLORIDE 80 MG: 40 TABLET, FILM COATED, EXTENDED RELEASE ORAL at 05:28

## 2017-08-16 RX ADMIN — HYDROMORPHONE HYDROCHLORIDE 1.5 MG: 1 INJECTION, SOLUTION INTRAMUSCULAR; INTRAVENOUS; SUBCUTANEOUS at 07:04

## 2017-08-16 RX ADMIN — OXYCODONE HYDROCHLORIDE 80 MG: 40 TABLET, FILM COATED, EXTENDED RELEASE ORAL at 10:04

## 2017-08-16 RX ADMIN — HYDROXYZINE HYDROCHLORIDE 50 MG: 25 TABLET ORAL at 15:52

## 2017-08-16 RX ADMIN — BUTALBITAL, ACETAMINOPHEN, CAFFEINE, AND CODEINE PHOSPHATE 2 CAPSULE: 30; 50; 40; 325 CAPSULE ORAL at 15:52

## 2017-08-16 RX ADMIN — HYDROXYZINE HYDROCHLORIDE 50 MG: 25 TABLET ORAL at 08:49

## 2017-08-16 RX ADMIN — METHYLPHENIDATE HYDROCHLORIDE 20 MG: 10 TABLET ORAL at 07:05

## 2017-08-16 RX ADMIN — BUTALBITAL, ACETAMINOPHEN, CAFFEINE, AND CODEINE PHOSPHATE 2 CAPSULE: 30; 50; 40; 325 CAPSULE ORAL at 08:48

## 2017-08-16 RX ADMIN — IBUPROFEN 600 MG: 600 TABLET ORAL at 13:22

## 2017-08-16 NOTE — PROVIDER NOTIFICATION
Patient had 1 liquid BM , requesting Imodium as she takes this at home for this. Hospitalist ordered continue to monitor and if continues to have liquid BM collect C-DIFF specimen per protocol. He will follow up with her in the morning. Patient updated.

## 2017-08-16 NOTE — PROGRESS NOTES
[facility]  Infectious Disease Progress Note          Assessment and Plan:   IMPRESSION:   1.  Carlie Myers is a 61-year-old female with 2 weeks of cough, low-grade fever and left pleuritic chest pain, found to have left pleural fluid, possibly some infiltrate, treated as pneumonia, not resolving, including ongoing fever, now pleural tap is exudative, probably has an empyema.   2.  History of chronic pain syndrome with multiple prior spine surgeries, ongoing pain, currently being evaluated.  No real signs of spine infection.   3.  Prior bilateral knee replacements.   4.  Tobacco abuse.   5 Cfronic embrel wo prior infection issues      RECOMMENDATIONS:   1.  Continue Zosyn. Cx pending likely to be neg on antibiotics  2.  Loculated effusion op noted, doing OK postop  3 Hold embrel about 3 weeks more        Interval History:   no new complaints  feels OK,op noted, exudate, T down, cx neg so far              Medications:       oxyCODONE (ROXICODONE) IR tablet 30 mg  30 mg Oral 5x Daily     oxyCODONE (OxyCONTIN) 12 hr tablet 80 mg  80 mg Oral Q6H     butalbital-APAP-caffeine-codeine  2 capsule Oral TID     sodium chloride (PF)  3 mL Intracatheter Q8H     bacitracin   Topical TID     senna-docusate  1-2 tablet Oral BID     piperacillin-tazobactam  3.375 g Intravenous Q6H     dextromethorphan-guaiFENesin  1 tablet Oral BID     diazepam (VALIUM) tablet 5 mg  5 mg Oral TID     gabapentin  800 mg Oral TID     hydrOXYzine  50 mg Oral TID     ibuprofen (ADVIL/MOTRIN) tablet 600 mg  600 mg Oral 4x Daily     methylphenidate  20 mg Oral 4x Daily     multivitamin, therapeutic  1 tablet Oral Daily     omeprazole (priLOSEC) CR capsule 40 mg  40 mg Oral QAM     sertraline (ZOLOFT) tablet 150 mg  150 mg Oral Daily     tiZANidine (ZANAFLEX) tablet 2 mg  2 mg Oral BID     buPROPion (WELLBUTRIN XL) 24 hr tablet 300 mg  300 mg Oral Daily                  Physical Exam:   Blood pressure 124/72, pulse 80, temperature  98  F (36.7  C), temperature source Oral, resp. rate 16, weight 85 kg (187 lb 6.3 oz), SpO2 97 %.  Wt Readings from Last 2 Encounters:   08/16/17 85 kg (187 lb 6.3 oz)   10/12/13 95.3 kg (210 lb)     Vital Signs with Ranges  Temp:  [97.9  F (36.6  C)-98.8  F (37.1  C)] 98  F (36.7  C)  Heart Rate:  [81-90] 81  Resp:  [16] 16  BP: (104-131)/(56-74) 124/72  SpO2:  [89 %-97 %] 97 %    Constitutional: Awake, alert, cooperative, no apparent distress    Lungs: Congestion to auscultation bilaterally, no crackles or wheezing   Cardiovascular: Regular rate and rhythm, normal S1 and S2, and no murmur noted   Abdomen: Normal bowel sounds, soft, non-distended, non-tender   Skin: No rashes, no cyanosis, no edema   Other:           Data:   All microbiology laboratory data reviewed.  Recent Labs   Lab Test  08/16/17   0654  08/15/17   0720  08/14/17   0708   WBC  3.3*  2.3*  2.5*   HGB  8.8*  9.2*  11.4*   HCT  27.0*  28.7*  35.2   MCV  94  95  94   PLT  191  176  174     Recent Labs   Lab Test  08/15/17   0720  08/13/17   1140  08/13/17   0653   CR  0.51*  0.74  0.66     No lab results found.  Recent Labs   Lab Test  08/14/17   1813  08/12/17   0912  08/12/17   0827  08/11/17   1415  08/09/17   1240  08/09/17   1235  08/07/17   0425  08/07/17   0420   CULT  Culture negative after 2 days  Culture negative monitoring continues  Culture negative monitoring continues  No growth after 4 days  No growth after 4 days  No growth  Culture negative monitoring continues  No growth  No growth  No growth  No growth

## 2017-08-16 NOTE — PROGRESS NOTES
THORACIC SURGERY POD #2 s/p left VATS and decortication for loculated parapneumonic effusion.     Doing OK.    Pain service involved.   Satting >98% on 2L NC.   Laying in bed.  Refusing to get up to chair due to prior back injury.  Requests abdominal binder or brace to help with back pain when ambulating.   No air leaks.     I/O: 105 mL over 24*    Temp:  [97.9  F (36.6  C)-98.8  F (37.1  C)] 98  F (36.7  C)  Heart Rate:  [81-90] 81  Resp:  [16] 16  BP: (104-131)/(56-74) 124/72  SpO2:  [89 %-97 %] 97 %  Incision c/d/i, surrounding induration without erythema, crepitus, or fluctuance.   Chest tubes to suction with serosang output.    Laying in right lat decub.  Sister at bedside. No distress.    Continue CTs to suction.    Ambulate!!!  Will order an abdominal binder to see if this helps her back pain when walking.   Aggressive pulmonary toilet.   OK to sit very upright in bed for meals but needs to walk more frequently if she's not sitting up in chair.    Pathology pending.    JAVON WOODY MS, PA-C  [facility]  office: [phone]  cell: [phone]  Available M-F, 7AM-5PM

## 2017-08-16 NOTE — PLAN OF CARE
Problem: Pneumonia (Adult)  Goal: Signs and Symptoms of Listed Potential Problems Will be Absent or Manageable (Pneumonia)  Signs and symptoms of listed potential problems will be absent or manageable by discharge/transition of care (reference Pneumonia (Adult) CPG).   Outcome: No Change  Patient is alert and orientated X 4, up with assist of one to commode, vital signs stable, she was encouraged to walk and use IS, pain manage with PRN Dilaudid and other Scheduled pain medications. Chest tubes no air leak, lung sounds diminished, Tele: SR.

## 2017-08-16 NOTE — PLAN OF CARE
"Problem: Goal Outcome Summary  Goal: Goal Outcome Summary     PT: Order received. Evaluation completed; treatment initiated. Patient is a 61-year-old female with a history of nicotine dependence, neuropathy, psoriatic arthritis, chronic pain syndrome, on multiple narcotics, mitral valve prolapse, admitted for CAP. Prolonged hospital stay due to complicated pleural effusion; she is now POD #2 s/p VATS and decortication. At baseline, patient lives with her  in an accessible house; 18 steps to access shower on second floor but can remain on first floor if needed. Patient reports limited mobility at home secondary to chronic pain; she sits in a lift chair the majority of the time; utilizes a 4WW in the community. Patient's daughter is her PCA and assists with dressing, bathing, and household tasks.     Discharge Planner PT   Patient plan for discharge: Discharge to home with assistance from family and home health services  Current status: Patient transfers supine <> sit with HOB elevated and bed rail with CGA. She transfers sit <> stand and ambulated 10 feet x 1 and 15 feet x 1 within room with CGA and FWW. Patient does tend to abandon walker and attempts to walk by holding onto furniture in room for stability. Patient on 2 L/min oxygen; at rest, spO2 92%, following ambulation 90%. Patient rated pain at 7-9/10 with activity.  Barriers to return to prior living situation: Will require 24 hour care/assistance; patient's  is available to assist but she notes, \"I'm not sure that he would hear me if I called for help\"; level of assistance required for safety with functional mobility; need for supplemental oxygen  Recommendations for discharge: Anticipate that patient will be able to discharge to home with 24 hour care/assistance from family (need to ensure that family is able to provide) and home health PT and OT; may require rent/purchase of FWW; will continue to assess. If family unable to provide 24 hour " care/assistance, recommend discharge to TCU.  Rationale for recommendations: Despite significant pain, patient is able to complete functional mobility with CGA; home is accessible.       Entered by: Dixie Osorio 08/16/2017 9:46 AM

## 2017-08-16 NOTE — PLAN OF CARE
Problem: Individualization  Goal: Patient Preferences  Outcome: No Change  Vss, a-febrile, c/o back pain, hx of chronic back pain, pain medications given as scheduled, ambulated x 2  , tolerated well, encourage po intake and more ambulation, chest tubes in place, dressing CDI, no leaks , voiding adequately.

## 2017-08-16 NOTE — PROGRESS NOTES
[facility]    Hospitalist Progress Note    Date of Service (when I saw the patient): 08/16/2017    Assessment & Plan   Carlie Myers is a 61-year-old white female with a history of nicotine dependence, neuropathy, psoriatic arthritis, chronic pain syndrome, on multiple narcotics, mitral valve prolapse, admitted for CAP. Prolonged hospital stay due to complicated pleural effusion and pain control.      Sepsis, complicated parapneumonic effusion s/p VATS and decortication 8/14:   Diminished cough/inspiratory effort from chronic back pain. UA on admission negative. US 8/8 with small left effusion. Thoracentesis 8/11 with 20ml exudative fluid removed, cytology negative. Strep pneumo and legionella urine Ag negative. CT chest 8/12 with loculated left effusion. Rocephin/Zithromax discontinued and started Zosyn 8/12. RUQ US negative. Underwent above procedure by Dr. Ramesh.  - chest tubes per thoracic surgery, 24 hr outputs decreasing  - WBC leukopenic likely reactive from sepsis, afebrile.  - ID following, c/w Zosyn  - bld and pleural cx negative thus far, OR cultures pending  - Encourage IS use, wean oxygen as able for sats 92%.   - Mucinex BID, prn tessalon      Hypochloremic hyponatremia, resolved:  Na normal on admission, down to 131 on 8/10. Patient with reduced oral intake due to lack of appetite. Given fluid bolus 8/12 for hypotension. Na normal with IVF.  - monitor      Acute on chronic back pain  Anxiety:   [PTA on gabapentin, oxycodone, OxyContin, valium, ibuprofen, tizanidine, Fioricet, Wellbutrin, Zolpidem, Sertraline, Hydroxyzine, Ritalin].   Pain exacerbated by chest tubes, pneumonia. Has been taking large amounts of narcotics for 25 years per pt report. Overnight oximetry needing 1lpm conts, consider OP sleep study if remains hypoxic at discharge time.   - Appreciate pain consult (reduced Fioricet to TID)  - c/w home narcotic and anti-anxiety regimen.   - error on PTA medlist, patient  actually taking Oxycodone IR 30mg 5x/day --> d/w with patient who takes a dose between 3-4AM, will resume this and goal for today is to stop Dilaudid. Will leave prn oxycodone on for today.  - PT, encourage ambulation  - Monitor closely for respiratory depression/sedation given high      Hypertension:  - Holding PTA Maxzide given hypotension      Hypokalemia:  - K replacement protocol    DVT Prophylaxis: Pneumatic Compression Devices  Code Status: Full Code    Disposition: Expected discharge in 2-3 days once chest tubes removed, on room air, tolerating oral diet, on home pain regimen.    Charlotte Fu MD    Interval History   Had acute increase in pain last night and had to call her  for support. Was given high dose dilaudid with relief. Was able to get some sleep. Clarified her home pain regimen given error on PTA medlist as above. She says has been on narcotics for 20-25 years    -Data reviewed today: I reviewed all new labs and imaging results over the last 24 hours. I personally reviewed no images or EKG's today.    Physical Exam   Temp: 98.7  F (37.1  C) Temp src: Oral BP: 131/74   Heart Rate: 90 Resp: 16 SpO2: (!) 89 % O2 Device: None (Room air) Oxygen Delivery: 2 LPM  Vitals:    08/10/17 0546 08/16/17 0600   Weight: 86.5 kg (190 lb 11.2 oz) 85 kg (187 lb 6.3 oz)     Vital Signs with Ranges  Temp:  [97.9  F (36.6  C)-98.8  F (37.1  C)] 98.7  F (37.1  C)  Heart Rate:  [81-94] 90  Resp:  [16] 16  BP: (104-131)/(56-74) 131/74  SpO2:  [89 %-96 %] 89 %  I/O last 3 completed shifts:  In: 1480 [P.O.:1480]  Out: 1980 [Urine:1875; Chest Tube:105]    Constitutional: Awake, alert, cooperative, no apparent distress, pain when coughing.moving  Respiratory: Left lung base coarse sounds, right lung field clear  Cardiovascular: Regular rate and rhythm, normal S1 and S2, and no murmur noted  GI: Normal bowel sounds, soft, non-distended, non-tender  Skin/Integumen: No rashes, no cyanosis, no edema    Medications         oxyCODONE (ROXICODONE) IR tablet 30 mg  30 mg Oral 5x Daily     oxyCODONE (OxyCONTIN) 12 hr tablet 80 mg  80 mg Oral Q6H     butalbital-APAP-caffeine-codeine  2 capsule Oral TID     sodium chloride (PF)  3 mL Intracatheter Q8H     bacitracin   Topical TID     senna-docusate  1-2 tablet Oral BID     piperacillin-tazobactam  3.375 g Intravenous Q6H     dextromethorphan-guaiFENesin  1 tablet Oral BID     diazepam (VALIUM) tablet 5 mg  5 mg Oral TID     gabapentin  800 mg Oral TID     hydrOXYzine  50 mg Oral TID     ibuprofen (ADVIL/MOTRIN) tablet 600 mg  600 mg Oral 4x Daily     methylphenidate  20 mg Oral 4x Daily     multivitamin, therapeutic  1 tablet Oral Daily     omeprazole (priLOSEC) CR capsule 40 mg  40 mg Oral QAM     sertraline (ZOLOFT) tablet 150 mg  150 mg Oral Daily     tiZANidine (ZANAFLEX) tablet 2 mg  2 mg Oral BID     buPROPion (WELLBUTRIN XL) 24 hr tablet 300 mg  300 mg Oral Daily       Data     Recent Labs  Lab 08/16/17  0654 08/15/17  0720 08/14/17  0708 08/13/17  1140 08/13/17  0653  08/11/17  0659   WBC 3.3* 2.3* 2.5* 2.5* 2.6*  --   --    HGB 8.8* 9.2* 11.4* 10.7* 11.1*  --   --    MCV 94 95 94 94 95  --   --     176 174 188 191  --   --    INR  --   --   --  1.09  --   --   --    NA  --  136  --  135 134  < > 129*   POTASSIUM  --  3.7  --  3.8 3.5  < > 3.3*   CHLORIDE  --  100  --  98 96  < > 90*   CO2  --  30  --  29 32  < > 32   BUN  --  7  --  13 14  < > 12   CR  --  0.51*  --  0.74 0.66  < > 0.62   ANIONGAP  --  6  --  8 6  < > 7   BENNY  --  8.3*  --  8.6 8.7  < > 9.1   GLC  --  87  --  92 94  < > 107*   PROTTOTAL  --   --   --   --   --   --  7.5   < > = values in this interval not displayed.    No results found for this or any previous visit (from the past 24 hour(s)).

## 2017-08-16 NOTE — PROGRESS NOTES
" 08/16/17 0800   Quick Adds   Type of Visit Initial PT Evaluation   Living Environment   Lives With spouse   Living Arrangements house   Home Accessibility grab bars present (bathtub);grab bars present (toilet)   Number of Stairs to Enter Home 0   Number of Stairs Within Home (18 steps to access shower; can stay on main level otherwise)   Stair Railings at Home inside, present on right side   Transportation Available family or friend will provide   Living Environment Comment Patient's  is available 24/7 but she notes, \"I'm not sure that he would hear me if I called for help\".   Self-Care   Usual Activity Tolerance fair   Current Activity Tolerance fair   Regular Exercise no   Equipment Currently Used at Home lift device;walker, rolling   Activity/Exercise/Self-Care Comment Patient reports that she spends much of her time sitting in an anti-gravity lift chair; she was unable to clarify if the device was also a wheelchair. She uses a 4WW in the community.   Functional Level Prior   Ambulation 3-->assistive equipment and person   Transferring 3-->assistive equipment and person   Toileting 2-->assistive person   Bathing 2-->assistive person   Dressing 2-->assistive person   Eating 0-->independent   Communication 0-->understands/communicates without difficulty   Swallowing 2-->difficulty swallowing liquids/foods   Cognition 0 - no cognition issues reported   Fall history within last six months yes   Number of times patient has fallen within last six months 18   Which of the above functional risks had a recent onset or change? none   Prior Functional Level Comment Patient's daughter is her PCA and assists with bathing, dressing, and household tasks.   General Information   Onset of Illness/Injury or Date of Surgery - Date 08/14/17   Referring Physician Mary Gilbert PA   Patient/Family Goals Statement Patient would like to discharge to home with assistance from family.   Pertinent History of Current Problem " (include personal factors and/or comorbidities that impact the POC) Patient is a 61-year-old female with a history of nicotine dependence, neuropathy, psoriatic arthritis, chronic pain syndrome, on multiple narcotics, mitral valve prolapse, admitted for CAP. Prolonged hospital stay due to complicated pleural effusion; she is now POD #2 s/p VATS and decortication.   Precautions/Limitations fall precautions;oxygen therapy device and L/min  (On 2 L/min oxygen; no O2 at baseline)   General Info Comments Activity: ambulate; up in chair for meals. Range of motion to affected shoulder.   Cognitive Status Examination   Orientation orientation to person, place and time   Level of Consciousness alert   Follows Commands and Answers Questions 75% of the time   Personal Safety and Judgment at risk behaviors demonstrated   Memory intact   Cognitive Comment Patient tends to let go of walker and reach out for furniture in room to complete transitions.   Pain Assessment   Patient Currently in Pain Yes, see Vital Sign flowsheet  (Rates back, chest tube site pain at 9/10)   Integumentary/Edema   Integumentary/Edema Comments 3 chest tubes on left side; dressing in place.   Posture    Posture Forward head position;Protracted shoulders;Kyphosis   Posture Comments Patient reports that she is unable to stand upright secondary to multiple spinal surgeries.   Range of Motion (ROM)   ROM Comment LE AROM within functional limits.   Strength   Strength Comments Patient demonstrates anti-gravity LE strength; she declined manual muscle testing due to back pain.   Bed Mobility   Bed Mobility Comments Patient transfers supine <> sit with HOB elevated and bed rail with CGA.   Transfer Skills   Transfer Comments Patient transfers sit <> stand with CGA and FWW.   Gait   Gait Gait Skill;Gait Analysis   Gait Skills   Level of [place]: Gait contact guard   Physical Assist/Nonphysical Assist: Gait supervision;verbal cues;1 person assist  "  Weight-Bearing Restrictions: Gait full weight-bearing   Assistive Device for Transfer: Gait rolling walker   Gait Distance (10 feet)   Gait Analysis   Gait Pattern Used swing-to gait   Gait Deviations Noted decreased sheyla;decreased step length;decreased stride length;decreased toe-to-floor clearance;decreased weight-shifting ability   Impairments Contributing to Gait Deviations impaired balance;pain;decreased strength   Balance   Balance Comments Mild instability noted with standing balance and gait; patient tends to abandon walker and reach out for furniture for stability.   Sensory Examination   Sensory Perception Comments Patient reports that left foot is \"dead\" secondary to nerve damage.   General Therapy Interventions   Planned Therapy Interventions balance training;bed mobility training;gait training;neuromuscular re-education;ROM;strengthening;stretching;transfer training;home program guidelines;progressive activity/exercise   Clinical Impression   Criteria for Skilled Therapeutic Intervention yes, treatment indicated   PT Diagnosis Impaired functional mobility secondary to pain, decreased strength   Functional limitations due to impairments Impaired bed mobility, transfers, and ambulation   Clinical Presentation Evolving/Changing   Clinical Presentation Rationale Variable pain control; history of chronic pain   Clinical Decision Making (Complexity) Moderate complexity   Therapy Frequency` daily   Predicted Duration of Therapy Intervention (days/wks) 5 days   Anticipated Discharge Disposition Home with Assist;Home with Home Therapy   Risk & Benefits of therapy have been explained Yes   Patient, Family & other staff in agreement with plan of care Yes   Clinical Impression Comments Patient would benefit from skilled PT services to address impairments in functional mobility.   [facility] AM-PAC TM \"6 Clicks\"   2016, Trustees of [facility], under license to [employer].  All rights reserved. " "  6 Clicks Short Forms Basic Mobility Inpatient Short Form   [facility] AM-PAC  \"6 Clicks\" V.2 Basic Mobility Inpatient Short Form   1. Turning from your back to your side while in a flat bed without using bedrails? 4 - None   2. Moving from lying on your back to sitting on the side of a flat bed without using bedrails? 3 - A Little   3. Moving to and from a bed to a chair (including a wheelchair)? 3 - A Little   4. Standing up from a chair using your arms (e.g., wheelchair, or bedside chair)? 3 - A Little   5. To walk in hospital room? 3 - A Little   6. Climbing 3-5 steps with a railing? 2 - A Lot   Basic Mobility Raw Score (Score out of 24.Lower scores equate to lower levels of function) 18   Total Evaluation Time   Total Evaluation Time (Minutes) 10     "

## 2017-08-17 ENCOUNTER — APPOINTMENT (OUTPATIENT)
Dept: GENERAL RADIOLOGY | Facility: CLINIC | Age: 62
DRG: 853 | End: 2017-08-17
Attending: PHYSICIAN ASSISTANT
Payer: MEDICARE

## 2017-08-17 ENCOUNTER — APPOINTMENT (OUTPATIENT)
Dept: PHYSICAL THERAPY | Facility: CLINIC | Age: 62
DRG: 853 | End: 2017-08-17
Attending: INTERNAL MEDICINE
Payer: MEDICARE

## 2017-08-17 LAB
ACID FAST STN SPEC QL: NORMAL
ACID FAST STN SPEC QL: NORMAL
ANION GAP SERPL CALCULATED.3IONS-SCNC: 6 MMOL/L (ref 3–14)
BUN SERPL-MCNC: 7 MG/DL (ref 7–30)
CALCIUM SERPL-MCNC: 8.4 MG/DL (ref 8.5–10.1)
CHLORIDE SERPL-SCNC: 101 MMOL/L (ref 94–109)
CO2 SERPL-SCNC: 32 MMOL/L (ref 20–32)
CREAT SERPL-MCNC: 0.58 MG/DL (ref 0.52–1.04)
ERYTHROCYTE [DISTWIDTH] IN BLOOD BY AUTOMATED COUNT: 14.2 % (ref 10–15)
GFR SERPL CREATININE-BSD FRML MDRD: >90 ML/MIN/1.7M2
GLUCOSE SERPL-MCNC: 90 MG/DL (ref 70–99)
HCT VFR BLD AUTO: 29.7 % (ref 35–47)
HGB BLD-MCNC: 9.6 G/DL (ref 11.7–15.7)
MAGNESIUM SERPL-MCNC: 1.9 MG/DL (ref 1.6–2.3)
MCH RBC QN AUTO: 30.2 PG (ref 26.5–33)
MCHC RBC AUTO-ENTMCNC: 32.3 G/DL (ref 31.5–36.5)
MCV RBC AUTO: 93 FL (ref 78–100)
PLATELET # BLD AUTO: 203 10E9/L (ref 150–450)
POTASSIUM SERPL-SCNC: 3.2 MMOL/L (ref 3.4–5.3)
RBC # BLD AUTO: 3.18 10E12/L (ref 3.8–5.2)
SODIUM SERPL-SCNC: 139 MMOL/L (ref 133–144)
SPECIMEN SOURCE: NORMAL
WBC # BLD AUTO: 3.8 10E9/L (ref 4–11)

## 2017-08-17 PROCEDURE — 80048 BASIC METABOLIC PNL TOTAL CA: CPT | Performed by: INTERNAL MEDICINE

## 2017-08-17 PROCEDURE — 97530 THERAPEUTIC ACTIVITIES: CPT | Mod: GP

## 2017-08-17 PROCEDURE — 83735 ASSAY OF MAGNESIUM: CPT | Performed by: INTERNAL MEDICINE

## 2017-08-17 PROCEDURE — 25000128 H RX IP 250 OP 636: Performed by: INTERNAL MEDICINE

## 2017-08-17 PROCEDURE — 99232 SBSQ HOSP IP/OBS MODERATE 35: CPT | Performed by: INTERNAL MEDICINE

## 2017-08-17 PROCEDURE — 36415 COLL VENOUS BLD VENIPUNCTURE: CPT | Performed by: INTERNAL MEDICINE

## 2017-08-17 PROCEDURE — A9270 NON-COVERED ITEM OR SERVICE: HCPCS | Mod: GY | Performed by: INTERNAL MEDICINE

## 2017-08-17 PROCEDURE — 25000132 ZZH RX MED GY IP 250 OP 250 PS 637: Mod: GY | Performed by: INTERNAL MEDICINE

## 2017-08-17 PROCEDURE — 71010 XR CHEST PORT 1 VW: CPT

## 2017-08-17 PROCEDURE — 85027 COMPLETE CBC AUTOMATED: CPT | Performed by: INTERNAL MEDICINE

## 2017-08-17 PROCEDURE — 40000193 ZZH STATISTIC PT WARD VISIT

## 2017-08-17 PROCEDURE — 97116 GAIT TRAINING THERAPY: CPT | Mod: GP

## 2017-08-17 PROCEDURE — 12000007 ZZH R&B INTERMEDIATE

## 2017-08-17 RX ADMIN — OXYCODONE HYDROCHLORIDE 30 MG: 15 TABLET ORAL at 08:29

## 2017-08-17 RX ADMIN — OXYCODONE HYDROCHLORIDE 30 MG: 15 TABLET ORAL at 02:25

## 2017-08-17 RX ADMIN — TIZANIDINE 2 MG: 2 TABLET ORAL at 20:36

## 2017-08-17 RX ADMIN — HYDROXYZINE HYDROCHLORIDE 50 MG: 25 TABLET ORAL at 16:32

## 2017-08-17 RX ADMIN — HYDROXYZINE HYDROCHLORIDE 50 MG: 25 TABLET ORAL at 21:41

## 2017-08-17 RX ADMIN — GABAPENTIN 800 MG: 800 TABLET, FILM COATED ORAL at 08:22

## 2017-08-17 RX ADMIN — PIPERACILLIN SODIUM,TAZOBACTAM SODIUM 3.38 G: 3; .375 INJECTION, POWDER, FOR SOLUTION INTRAVENOUS at 13:23

## 2017-08-17 RX ADMIN — OXYCODONE HYDROCHLORIDE 30 MG: 15 TABLET ORAL at 23:01

## 2017-08-17 RX ADMIN — GUAIFENESIN AND DEXTROMETHORPHAN HYDROBROMIDE 1 TABLET: 600; 30 TABLET, EXTENDED RELEASE ORAL at 08:22

## 2017-08-17 RX ADMIN — PIPERACILLIN SODIUM,TAZOBACTAM SODIUM 3.38 G: 3; .375 INJECTION, POWDER, FOR SOLUTION INTRAVENOUS at 02:24

## 2017-08-17 RX ADMIN — HYDROMORPHONE HYDROCHLORIDE 1 MG: 1 INJECTION, SOLUTION INTRAMUSCULAR; INTRAVENOUS; SUBCUTANEOUS at 11:21

## 2017-08-17 RX ADMIN — PIPERACILLIN SODIUM,TAZOBACTAM SODIUM 3.38 G: 3; .375 INJECTION, POWDER, FOR SOLUTION INTRAVENOUS at 20:36

## 2017-08-17 RX ADMIN — OXYCODONE HYDROCHLORIDE 80 MG: 40 TABLET, FILM COATED, EXTENDED RELEASE ORAL at 10:22

## 2017-08-17 RX ADMIN — OXYCODONE HYDROCHLORIDE 80 MG: 40 TABLET, FILM COATED, EXTENDED RELEASE ORAL at 04:33

## 2017-08-17 RX ADMIN — POTASSIUM CHLORIDE 20 MEQ: 1500 TABLET, EXTENDED RELEASE ORAL at 10:23

## 2017-08-17 RX ADMIN — HYDROMORPHONE HYDROCHLORIDE 1 MG: 1 INJECTION, SOLUTION INTRAMUSCULAR; INTRAVENOUS; SUBCUTANEOUS at 16:32

## 2017-08-17 RX ADMIN — DIAZEPAM 5 MG: 5 TABLET ORAL at 21:41

## 2017-08-17 RX ADMIN — DIAZEPAM 5 MG: 5 TABLET ORAL at 08:22

## 2017-08-17 RX ADMIN — GABAPENTIN 800 MG: 800 TABLET, FILM COATED ORAL at 21:41

## 2017-08-17 RX ADMIN — BUPROPION HYDROCHLORIDE 300 MG: 300 TABLET, FILM COATED, EXTENDED RELEASE ORAL at 08:22

## 2017-08-17 RX ADMIN — DIAZEPAM 5 MG: 5 TABLET ORAL at 16:32

## 2017-08-17 RX ADMIN — OXYCODONE HYDROCHLORIDE 30 MG: 15 TABLET ORAL at 13:22

## 2017-08-17 RX ADMIN — SERTRALINE HYDROCHLORIDE 150 MG: 100 TABLET ORAL at 08:22

## 2017-08-17 RX ADMIN — BACITRACIN: 500 OINTMENT TOPICAL at 21:51

## 2017-08-17 RX ADMIN — GABAPENTIN 800 MG: 800 TABLET, FILM COATED ORAL at 16:32

## 2017-08-17 RX ADMIN — IBUPROFEN 600 MG: 600 TABLET ORAL at 13:22

## 2017-08-17 RX ADMIN — HYDROMORPHONE HYDROCHLORIDE 1 MG: 1 INJECTION, SOLUTION INTRAMUSCULAR; INTRAVENOUS; SUBCUTANEOUS at 21:48

## 2017-08-17 RX ADMIN — BUTALBITAL, ACETAMINOPHEN, CAFFEINE, AND CODEINE PHOSPHATE 2 CAPSULE: 30; 50; 40; 325 CAPSULE ORAL at 21:48

## 2017-08-17 RX ADMIN — IBUPROFEN 600 MG: 600 TABLET ORAL at 08:27

## 2017-08-17 RX ADMIN — OXYCODONE HYDROCHLORIDE 80 MG: 40 TABLET, FILM COATED, EXTENDED RELEASE ORAL at 21:42

## 2017-08-17 RX ADMIN — BUTALBITAL, ACETAMINOPHEN, CAFFEINE, AND CODEINE PHOSPHATE 2 CAPSULE: 30; 50; 40; 325 CAPSULE ORAL at 16:32

## 2017-08-17 RX ADMIN — METHYLPHENIDATE HYDROCHLORIDE 20 MG: 10 TABLET ORAL at 17:56

## 2017-08-17 RX ADMIN — TIZANIDINE 2 MG: 2 TABLET ORAL at 08:22

## 2017-08-17 RX ADMIN — BUTALBITAL, ACETAMINOPHEN, CAFFEINE, AND CODEINE PHOSPHATE 2 CAPSULE: 30; 50; 40; 325 CAPSULE ORAL at 08:22

## 2017-08-17 RX ADMIN — THERA TABS 1 TABLET: TAB at 08:22

## 2017-08-17 RX ADMIN — METHYLPHENIDATE HYDROCHLORIDE 20 MG: 10 TABLET ORAL at 10:22

## 2017-08-17 RX ADMIN — HYDROMORPHONE HYDROCHLORIDE 1 MG: 1 INJECTION, SOLUTION INTRAMUSCULAR; INTRAVENOUS; SUBCUTANEOUS at 02:24

## 2017-08-17 RX ADMIN — OXYCODONE HYDROCHLORIDE 80 MG: 40 TABLET, FILM COATED, EXTENDED RELEASE ORAL at 16:32

## 2017-08-17 RX ADMIN — HYDROXYZINE HYDROCHLORIDE 50 MG: 25 TABLET ORAL at 08:22

## 2017-08-17 RX ADMIN — METHYLPHENIDATE HYDROCHLORIDE 20 MG: 10 TABLET ORAL at 13:22

## 2017-08-17 RX ADMIN — PIPERACILLIN SODIUM,TAZOBACTAM SODIUM 3.38 G: 3; .375 INJECTION, POWDER, FOR SOLUTION INTRAVENOUS at 08:19

## 2017-08-17 RX ADMIN — OMEPRAZOLE 40 MG: 20 CAPSULE, DELAYED RELEASE ORAL at 08:22

## 2017-08-17 RX ADMIN — IBUPROFEN 600 MG: 600 TABLET ORAL at 17:56

## 2017-08-17 RX ADMIN — GUAIFENESIN AND DEXTROMETHORPHAN HYDROBROMIDE 1 TABLET: 600; 30 TABLET, EXTENDED RELEASE ORAL at 20:36

## 2017-08-17 RX ADMIN — HYDROMORPHONE HYDROCHLORIDE 1 MG: 1 INJECTION, SOLUTION INTRAMUSCULAR; INTRAVENOUS; SUBCUTANEOUS at 07:13

## 2017-08-17 RX ADMIN — IBUPROFEN 600 MG: 600 TABLET ORAL at 21:41

## 2017-08-17 RX ADMIN — BACITRACIN: 500 OINTMENT TOPICAL at 08:27

## 2017-08-17 RX ADMIN — OXYCODONE HYDROCHLORIDE 30 MG: 15 TABLET ORAL at 17:56

## 2017-08-17 RX ADMIN — METHYLPHENIDATE HYDROCHLORIDE 20 MG: 10 TABLET ORAL at 07:14

## 2017-08-17 RX ADMIN — BACITRACIN: 500 OINTMENT TOPICAL at 16:39

## 2017-08-17 NOTE — PLAN OF CARE
Problem: Goal Outcome Summary  Goal: Goal Outcome Summary  Outcome: Improving  A&O. RA. Tele SR. CT x3, SS output, no AL, no crepitus. Left LS coarse/ tubular. IS 1000. Walked x3. Up Ax1. Pain managed with sched pain meds and PRN IV dilaudid. No BM for shift, awaiting BM to send for C.dif r/o. Potassium replaced.

## 2017-08-17 NOTE — PLAN OF CARE
Problem: Lung Surgery (via Thoracotomy) (Adult)  Goal: Signs and Symptoms of Listed Potential Problems Will be Absent or Manageable (Lung Surgery)  Signs and symptoms of listed potential problems will be absent or manageable by discharge/transition of care (reference Lung Surgery (via Thoracotomy) (Adult) CPG).   Outcome: Improving  Patient is alert and orientated X 4, up with assist of one to commode, vital signs stable,Chest tubes to suction,  no air leak, lung sounds diminished, Tele: SR.  she was encouraged to walk and use IS, pain manage with PRN Dilaudid and Oxycodone.

## 2017-08-17 NOTE — PROGRESS NOTES
"THORACIC SURGERY POD #3 s/p left VATS and decortication for loculated parapneumonic effusion.     Doing OK.  Continues to be hesitant to walk.    Reporting \"30\" episodes of diarrhea yesterday - was unable to get to the restroom in time and messed the bed.    ID involved, recommending continued Zosyn.   Pain service involved.   Satting 93% on 1L NC.   Laying in bed.  Refusing to get up to chair due to prior back injury.  Requests abdominal binder or brace to help with back pain when ambulating.   No air leaks.     I/O: 80 mL over 24*    Temp:  [97.5  F (36.4  C)-98.6  F (37  C)] 98.2  F (36.8  C)  Pulse:  [79] 79  Heart Rate:  [78-87] 80  Resp:  [16] 16  BP: (107-135)/(58-83) 109/58  SpO2:  [91 %-97 %] 93 %  Incision c/d/i  Chest tubes to suction with serosang output.    Laying in bed.  Appears comfortable.      Continue CTs to suction.  Hopefully they can be pulled tomorrow.   Ambulate!!!  Abdominal binder ordered to help w back pain when walking.   Aggressive pulmonary toilet.   OK to sit very upright in bed for meals but needs to walk more frequently if she's not sitting up in chair.  CDiff PCR.       Cultures NGTD.    JAVON WOODY MS, PA-C  [facility]  office: [phone]  cell: [phone]  Available M-F, 7AM-5PM      "

## 2017-08-17 NOTE — PROGRESS NOTES
[facility]    Hospitalist Progress Note    Date of Service (when I saw the patient): 08/17/2017    Assessment & Plan   Carlie Myers is a 61-year-old white female with a history of nicotine dependence, neuropathy, psoriatic arthritis, chronic pain syndrome, on multiple narcotics, mitral valve prolapse, admitted for CAP. Prolonged hospital stay due to complicated pleural effusion and pain control.      Sepsis, complicated parapneumonic effusion s/p VATS and decortication 8/14 by Dr. Ramesh:   Diminished cough/inspiratory effort from chronic back pain. UA on admission negative. US 8/8 with small left effusion. Thoracentesis 8/11 with 20ml exudative fluid removed, cytology negative. Strep pneumo and legionella urine Ag negative. CT chest 8/12 with loculated left effusion. Rocephin/Zithromax discontinued and started Zosyn 8/12. RUQ US negative. Bld and pleural cx negative.  - chest tubes per thoracic surgery, 24 hr outputs decreasing, likely out tomorrow  - WBC leukopenic likely reactive from sepsis, afebrile.  - ID following, c/w Zosyn, recommend holding Enbrel for 3 more weeks  - OR cultures pending  - Encourage IS use, ambulation, wean oxygen as able for sats 92%.   - Mucinex BID, prn tessalon    Loose stools:  - started 8/16 overnight  - C.diff ordered      Hypochloremic hyponatremia, resolved:  Na normal on admission, down to 131 on 8/10. Patient with reduced oral intake due to lack of appetite. Given fluid bolus 8/12 for hypotension. Na normal with IVF.  - monitor      Acute on chronic back pain  Anxiety:   [PTA on gabapentin, oxycodone, OxyContin, valium, ibuprofen, tizanidine, Fioricet, Wellbutrin, Zolpidem, Sertraline, Hydroxyzine, Ritalin].   Pain exacerbated by chest tubes, pneumonia. Has been taking large amounts of narcotics for 25 years per pt report. Overnight oximetry needing 1lpm conts, consider OP sleep study if remains hypoxic at discharge time.   - Appreciate pain consult (reduced  Fioricet to TID)  - c/w home narcotic and anti-anxiety regimen.   - discontinue Dilaudid once chest tubes removed, prn oxycodone  - PT, encourage ambulation  - Monitor closely for respiratory depression/sedation given high      Hypertension:  - Holding PTA Maxzide given hypotension      Hypokalemia:  - K replacement protocol    DVT Prophylaxis: Pneumatic Compression Devices  Code Status: Full Code    Disposition: Expected discharge in 2 days once chest tubes out and on home pain regimen/tolerating ambulation.    Charlotte Fu MD    Interval History    Loose stools yesterday, none today. Denies fevers/N/V/abd pain. Trying to ambulate per staff recommendations.    -Data reviewed today: I reviewed all new labs and imaging results over the last 24 hours. I personally reviewed the chest x-ray image(s) showing chest tubes in place, no changes from prior.    Physical Exam   Temp: 98.6  F (37  C) Temp src: Oral BP: 114/63 Pulse: 85 Heart Rate: 80 Resp: 16 SpO2: 90 % O2 Device: None (Room air) Oxygen Delivery: 1 LPM  Vitals:    08/10/17 0546 08/16/17 0600 08/17/17 0600   Weight: 86.5 kg (190 lb 11.2 oz) 85 kg (187 lb 6.3 oz) 79.4 kg (175 lb 0.7 oz)     Vital Signs with Ranges  Temp:  [97.5  F (36.4  C)-98.6  F (37  C)] 98.6  F (37  C)  Pulse:  [79-85] 85  Heart Rate:  [78-87] 80  Resp:  [16] 16  BP: (107-135)/(58-83) 114/63  SpO2:  [90 %-93 %] 90 %  I/O last 3 completed shifts:  In: 1480 [P.O.:1480]  Out: 1130 [Urine:1050; Chest Tube:80]    Constitutional: Awake, alert, cooperative, no apparent distress  Respiratory: Clear to auscultation right lung field, left lungs with chest tubes sounds, no crackles or wheezing  Cardiovascular: Regular rate and rhythm, normal S1 and S2, and no murmur noted  GI: Hypoactive bowel sounds, soft, non-distended, non-tender  Skin/Integumen: No rashes, no cyanosis, no edema    Medications        oxyCODONE (ROXICODONE) IR tablet 30 mg  30 mg Oral 5x Daily     oxyCODONE (OxyCONTIN) 12 hr  tablet 80 mg  80 mg Oral Q6H     butalbital-APAP-caffeine-codeine  2 capsule Oral TID     sodium chloride (PF)  3 mL Intracatheter Q8H     bacitracin   Topical TID     piperacillin-tazobactam  3.375 g Intravenous Q6H     dextromethorphan-guaiFENesin  1 tablet Oral BID     diazepam (VALIUM) tablet 5 mg  5 mg Oral TID     gabapentin  800 mg Oral TID     hydrOXYzine  50 mg Oral TID     ibuprofen (ADVIL/MOTRIN) tablet 600 mg  600 mg Oral 4x Daily     methylphenidate  20 mg Oral 4x Daily     multivitamin, therapeutic  1 tablet Oral Daily     omeprazole (priLOSEC) CR capsule 40 mg  40 mg Oral QAM     sertraline (ZOLOFT) tablet 150 mg  150 mg Oral Daily     tiZANidine (ZANAFLEX) tablet 2 mg  2 mg Oral BID     buPROPion (WELLBUTRIN XL) 24 hr tablet 300 mg  300 mg Oral Daily       Data     Recent Labs  Lab 08/17/17  0718 08/16/17  0654 08/15/17  0720  08/13/17  1140  08/11/17  0659   WBC 3.8* 3.3* 2.3*  < > 2.5*  < >  --    HGB 9.6* 8.8* 9.2*  < > 10.7*  < >  --    MCV 93 94 95  < > 94  < >  --     191 176  < > 188  < >  --    INR  --   --   --   --  1.09  --   --      --  136  --  135  < > 129*   POTASSIUM 3.2*  --  3.7  --  3.8  < > 3.3*   CHLORIDE 101  --  100  --  98  < > 90*   CO2 32  --  30  --  29  < > 32   BUN 7  --  7  --  13  < > 12   CR 0.58  --  0.51*  --  0.74  < > 0.62   ANIONGAP 6  --  6  --  8  < > 7   BENNY 8.4*  --  8.3*  --  8.6  < > 9.1   GLC 90  --  87  --  92  < > 107*   PROTTOTAL  --   --   --   --   --   --  7.5   < > = values in this interval not displayed.    Recent Results (from the past 24 hour(s))   XR Chest Port 1 View    Narrative    CHEST PORTABLE ONE VIEW August 17, 2017 5:47 AM     HISTORY: Post procedure pleural effusion.    COMPARISON: 8/16/2017.    FINDINGS: Three left chest tubes in place. No pneumothorax identified.  Elevated left hemidiaphragm with minimal left basilar opacity that  likely represents small amount of atelectasis. Right lung expanded and  clear. Fusion  hardware lower cervical spine. Right base atelectasis  has resolved since yesterday. Otherwise no change.      Impression    IMPRESSION: Three left chest tube in place with no evidence for  pneumothorax.    RAUL MARTIN MD

## 2017-08-17 NOTE — PLAN OF CARE
Problem: Goal Outcome Summary  Goal: Goal Outcome Summary  Discharge Planner PT   Patient plan for discharge: Home with family assist.  Current status: Pt requires min assist for bed mobility, assist for trunk and multiple lines. Pt performed sit to/from stand and stand pivot transfers with FWW with min assist, requires multiple cues for safety and use of FWW. Pt ambulated 150' x 2 with FWW with CGA.  Barriers to return to prior living situation: Decreased activity tolerance, poor safety awareness.  Recommendations for discharge: Anticipate that patient will be able to discharge to home with 24 hour care/assistance from family (need to ensure that family is able to provide) and home health PT and OT; may require rent/purchase of FWW; will continue to assess. If family unable to provide 24 hour care/assistance, recommend discharge to TCU.  Rationale for recommendations: Despite reports of significant pain, patient demonstrates the ability to mobilize and ambulate with assist.       Entered by: Tiffani Newby 08/17/2017 9:29 AM

## 2017-08-17 NOTE — PROGRESS NOTES
[facility]  Infectious Disease Progress Note          Assessment and Plan:   IMPRESSION:   1.  Carlie Myers is a 61-year-old female with 2 weeks of cough, low-grade fever and left pleuritic chest pain, found to have left pleural fluid, possibly some infiltrate, treated as pneumonia, not resolving, including ongoing fever, now pleural tap is exudative, probably has an empyema.   2.  History of chronic pain syndrome with multiple prior spine surgeries, ongoing pain, currently being evaluated.  No real signs of spine infection.   3.  Prior bilateral knee replacements.   4.  Tobacco abuse.   5 Cfronic embrel wo prior infection issues      RECOMMENDATIONS:   1.  Continue Zosyn. Cx pending likely to be neg on antibiotics  2.  Loculated effusion op noted, doing OK postop  3 Hold embrel about 3 weeks more        Interval History:   no new complaints  feels OK,op noted, exudate, T down, cx neg so far              Medications:       oxyCODONE (ROXICODONE) IR tablet 30 mg  30 mg Oral 5x Daily     oxyCODONE (OxyCONTIN) 12 hr tablet 80 mg  80 mg Oral Q6H     butalbital-APAP-caffeine-codeine  2 capsule Oral TID     sodium chloride (PF)  3 mL Intracatheter Q8H     bacitracin   Topical TID     piperacillin-tazobactam  3.375 g Intravenous Q6H     dextromethorphan-guaiFENesin  1 tablet Oral BID     diazepam (VALIUM) tablet 5 mg  5 mg Oral TID     gabapentin  800 mg Oral TID     hydrOXYzine  50 mg Oral TID     ibuprofen (ADVIL/MOTRIN) tablet 600 mg  600 mg Oral 4x Daily     methylphenidate  20 mg Oral 4x Daily     multivitamin, therapeutic  1 tablet Oral Daily     omeprazole (priLOSEC) CR capsule 40 mg  40 mg Oral QAM     sertraline (ZOLOFT) tablet 150 mg  150 mg Oral Daily     tiZANidine (ZANAFLEX) tablet 2 mg  2 mg Oral BID     buPROPion (WELLBUTRIN XL) 24 hr tablet 300 mg  300 mg Oral Daily                  Physical Exam:   Blood pressure 131/86, pulse 97, temperature 98.2  F (36.8  C), temperature source  Oral, resp. rate 16, weight 79.4 kg (175 lb 0.7 oz), SpO2 90 %.  Wt Readings from Last 2 Encounters:   08/17/17 79.4 kg (175 lb 0.7 oz)   10/12/13 95.3 kg (210 lb)     Vital Signs with Ranges  Temp:  [97.5  F (36.4  C)-98.6  F (37  C)] 98.2  F (36.8  C)  Pulse:  [79-97] 97  Heart Rate:  [78-96] 96  Resp:  [16] 16  BP: (107-135)/(58-86) 131/86  SpO2:  [90 %-93 %] 90 %    Constitutional: Awake, alert, cooperative, no apparent distress    Lungs: Congestion to auscultation bilaterally, no crackles or wheezing   Cardiovascular: Regular rate and rhythm, normal S1 and S2, and no murmur noted   Abdomen: Normal bowel sounds, soft, non-distended, non-tender   Skin: No rashes, no cyanosis, no edema   Other:           Data:   All microbiology laboratory data reviewed.  Recent Labs   Lab Test  08/17/17   0718  08/16/17   0654  08/15/17   0720   WBC  3.8*  3.3*  2.3*   HGB  9.6*  8.8*  9.2*   HCT  29.7*  27.0*  28.7*   MCV  93  94  95   PLT  203  191  176     Recent Labs   Lab Test  08/17/17   0718  08/15/17   0720  08/13/17   1140   CR  0.58  0.51*  0.74     No lab results found.  Recent Labs   Lab Test  08/14/17   1813  08/12/17   0912  08/12/17   0827  08/11/17   1415  08/09/17   1240  08/09/17   1235  08/07/17   0425  08/07/17   0420   CULT  Culture negative after 3 days  Culture received and in progress.  Positive AFB results are called as soon as detected.    Final report to follow in 7 to 8 weeks.    Culture negative monitoring continues  Culture negative monitoring continues  No growth after 5 days  No growth after 5 days  No growth  Culture negative monitoring continues  No growth  No growth  No growth  No growth

## 2017-08-18 ENCOUNTER — APPOINTMENT (OUTPATIENT)
Dept: GENERAL RADIOLOGY | Facility: CLINIC | Age: 62
DRG: 853 | End: 2017-08-18
Attending: PHYSICIAN ASSISTANT
Payer: MEDICARE

## 2017-08-18 ENCOUNTER — APPOINTMENT (OUTPATIENT)
Dept: PHYSICAL THERAPY | Facility: CLINIC | Age: 62
DRG: 853 | End: 2017-08-18
Attending: INTERNAL MEDICINE
Payer: MEDICARE

## 2017-08-18 LAB
BACTERIA SPEC CULT: NO GROWTH
BACTERIA SPEC CULT: NO GROWTH
BACTERIA SPEC CULT: NORMAL
ERYTHROCYTE [DISTWIDTH] IN BLOOD BY AUTOMATED COUNT: 14.1 % (ref 10–15)
HCT VFR BLD AUTO: 29.3 % (ref 35–47)
HGB BLD-MCNC: 9.8 G/DL (ref 11.7–15.7)
Lab: NORMAL
Lab: NORMAL
MCH RBC QN AUTO: 30.8 PG (ref 26.5–33)
MCHC RBC AUTO-ENTMCNC: 33.4 G/DL (ref 31.5–36.5)
MCV RBC AUTO: 92 FL (ref 78–100)
PLATELET # BLD AUTO: 254 10E9/L (ref 150–450)
POTASSIUM SERPL-SCNC: 3.4 MMOL/L (ref 3.4–5.3)
RBC # BLD AUTO: 3.18 10E12/L (ref 3.8–5.2)
SPECIMEN SOURCE: NORMAL
WBC # BLD AUTO: 5.1 10E9/L (ref 4–11)

## 2017-08-18 PROCEDURE — A9270 NON-COVERED ITEM OR SERVICE: HCPCS | Mod: GY | Performed by: INTERNAL MEDICINE

## 2017-08-18 PROCEDURE — 97530 THERAPEUTIC ACTIVITIES: CPT | Mod: GP

## 2017-08-18 PROCEDURE — 99232 SBSQ HOSP IP/OBS MODERATE 35: CPT | Performed by: INTERNAL MEDICINE

## 2017-08-18 PROCEDURE — 25000132 ZZH RX MED GY IP 250 OP 250 PS 637: Mod: GY | Performed by: INTERNAL MEDICINE

## 2017-08-18 PROCEDURE — 40000193 ZZH STATISTIC PT WARD VISIT

## 2017-08-18 PROCEDURE — 25000128 H RX IP 250 OP 636: Performed by: INTERNAL MEDICINE

## 2017-08-18 PROCEDURE — 36415 COLL VENOUS BLD VENIPUNCTURE: CPT | Performed by: INTERNAL MEDICINE

## 2017-08-18 PROCEDURE — 12000007 ZZH R&B INTERMEDIATE

## 2017-08-18 PROCEDURE — 84132 ASSAY OF SERUM POTASSIUM: CPT | Performed by: INTERNAL MEDICINE

## 2017-08-18 PROCEDURE — 71010 XR CHEST PORT 1 VW: CPT

## 2017-08-18 PROCEDURE — 85027 COMPLETE CBC AUTOMATED: CPT | Performed by: INTERNAL MEDICINE

## 2017-08-18 PROCEDURE — 97116 GAIT TRAINING THERAPY: CPT | Mod: GP

## 2017-08-18 RX ORDER — ALBUTEROL SULFATE 0.83 MG/ML
2.5 SOLUTION RESPIRATORY (INHALATION) EVERY 4 HOURS PRN
Status: DISCONTINUED | OUTPATIENT
Start: 2017-08-18 | End: 2017-08-19 | Stop reason: HOSPADM

## 2017-08-18 RX ADMIN — OXYCODONE HYDROCHLORIDE 30 MG: 15 TABLET ORAL at 02:30

## 2017-08-18 RX ADMIN — IBUPROFEN 600 MG: 600 TABLET ORAL at 21:12

## 2017-08-18 RX ADMIN — DIAZEPAM 5 MG: 5 TABLET ORAL at 16:37

## 2017-08-18 RX ADMIN — METHYLPHENIDATE HYDROCHLORIDE 20 MG: 10 TABLET ORAL at 06:49

## 2017-08-18 RX ADMIN — BUTALBITAL, ACETAMINOPHEN, CAFFEINE, AND CODEINE PHOSPHATE 2 CAPSULE: 30; 50; 40; 325 CAPSULE ORAL at 21:12

## 2017-08-18 RX ADMIN — HYDROXYZINE HYDROCHLORIDE 50 MG: 25 TABLET ORAL at 16:37

## 2017-08-18 RX ADMIN — HYDROXYZINE HYDROCHLORIDE 50 MG: 25 TABLET ORAL at 21:10

## 2017-08-18 RX ADMIN — SERTRALINE HYDROCHLORIDE 150 MG: 100 TABLET ORAL at 08:06

## 2017-08-18 RX ADMIN — HYDROMORPHONE HYDROCHLORIDE 1 MG: 1 INJECTION, SOLUTION INTRAMUSCULAR; INTRAVENOUS; SUBCUTANEOUS at 20:59

## 2017-08-18 RX ADMIN — DIAZEPAM 5 MG: 5 TABLET ORAL at 21:12

## 2017-08-18 RX ADMIN — HYDROMORPHONE HYDROCHLORIDE 1 MG: 1 INJECTION, SOLUTION INTRAMUSCULAR; INTRAVENOUS; SUBCUTANEOUS at 16:49

## 2017-08-18 RX ADMIN — OXYCODONE HYDROCHLORIDE 80 MG: 40 TABLET, FILM COATED, EXTENDED RELEASE ORAL at 10:27

## 2017-08-18 RX ADMIN — IBUPROFEN 600 MG: 600 TABLET ORAL at 13:52

## 2017-08-18 RX ADMIN — OXYCODONE HYDROCHLORIDE 30 MG: 15 TABLET ORAL at 08:06

## 2017-08-18 RX ADMIN — OXYCODONE HYDROCHLORIDE 80 MG: 40 TABLET, FILM COATED, EXTENDED RELEASE ORAL at 05:10

## 2017-08-18 RX ADMIN — ZOLPIDEM TARTRATE 10 MG: 5 TABLET, FILM COATED ORAL at 21:13

## 2017-08-18 RX ADMIN — GUAIFENESIN AND DEXTROMETHORPHAN HYDROBROMIDE 1 TABLET: 600; 30 TABLET, EXTENDED RELEASE ORAL at 08:06

## 2017-08-18 RX ADMIN — OXYCODONE HYDROCHLORIDE 30 MG: 15 TABLET ORAL at 13:52

## 2017-08-18 RX ADMIN — METHYLPHENIDATE HYDROCHLORIDE 20 MG: 10 TABLET ORAL at 18:08

## 2017-08-18 RX ADMIN — OXYCODONE HYDROCHLORIDE 30 MG: 15 TABLET ORAL at 21:16

## 2017-08-18 RX ADMIN — OMEPRAZOLE 40 MG: 20 CAPSULE, DELAYED RELEASE ORAL at 08:06

## 2017-08-18 RX ADMIN — TIZANIDINE 2 MG: 2 TABLET ORAL at 08:06

## 2017-08-18 RX ADMIN — BUTALBITAL, ACETAMINOPHEN, CAFFEINE, AND CODEINE PHOSPHATE 2 CAPSULE: 30; 50; 40; 325 CAPSULE ORAL at 08:06

## 2017-08-18 RX ADMIN — METHYLPHENIDATE HYDROCHLORIDE 20 MG: 10 TABLET ORAL at 13:52

## 2017-08-18 RX ADMIN — OXYCODONE HYDROCHLORIDE 30 MG: 15 TABLET ORAL at 18:35

## 2017-08-18 RX ADMIN — HYDROMORPHONE HYDROCHLORIDE 1 MG: 1 INJECTION, SOLUTION INTRAMUSCULAR; INTRAVENOUS; SUBCUTANEOUS at 02:30

## 2017-08-18 RX ADMIN — PIPERACILLIN SODIUM,TAZOBACTAM SODIUM 3.38 G: 3; .375 INJECTION, POWDER, FOR SOLUTION INTRAVENOUS at 20:58

## 2017-08-18 RX ADMIN — TIZANIDINE 2 MG: 2 TABLET ORAL at 20:58

## 2017-08-18 RX ADMIN — IBUPROFEN 600 MG: 600 TABLET ORAL at 18:08

## 2017-08-18 RX ADMIN — BUTALBITAL, ACETAMINOPHEN, CAFFEINE, AND CODEINE PHOSPHATE 2 CAPSULE: 30; 50; 40; 325 CAPSULE ORAL at 16:37

## 2017-08-18 RX ADMIN — BACITRACIN: 500 OINTMENT TOPICAL at 08:05

## 2017-08-18 RX ADMIN — HYDROXYZINE HYDROCHLORIDE 50 MG: 25 TABLET ORAL at 08:06

## 2017-08-18 RX ADMIN — GABAPENTIN 800 MG: 800 TABLET, FILM COATED ORAL at 16:37

## 2017-08-18 RX ADMIN — PIPERACILLIN SODIUM,TAZOBACTAM SODIUM 3.38 G: 3; .375 INJECTION, POWDER, FOR SOLUTION INTRAVENOUS at 13:52

## 2017-08-18 RX ADMIN — GABAPENTIN 800 MG: 800 TABLET, FILM COATED ORAL at 08:06

## 2017-08-18 RX ADMIN — GUAIFENESIN AND DEXTROMETHORPHAN HYDROBROMIDE 1 TABLET: 600; 30 TABLET, EXTENDED RELEASE ORAL at 20:58

## 2017-08-18 RX ADMIN — OXYCODONE HYDROCHLORIDE 80 MG: 40 TABLET, FILM COATED, EXTENDED RELEASE ORAL at 21:10

## 2017-08-18 RX ADMIN — PIPERACILLIN SODIUM,TAZOBACTAM SODIUM 3.38 G: 3; .375 INJECTION, POWDER, FOR SOLUTION INTRAVENOUS at 08:06

## 2017-08-18 RX ADMIN — GABAPENTIN 800 MG: 800 TABLET, FILM COATED ORAL at 21:13

## 2017-08-18 RX ADMIN — PIPERACILLIN SODIUM,TAZOBACTAM SODIUM 3.38 G: 3; .375 INJECTION, POWDER, FOR SOLUTION INTRAVENOUS at 02:35

## 2017-08-18 RX ADMIN — DIAZEPAM 5 MG: 5 TABLET ORAL at 08:06

## 2017-08-18 RX ADMIN — BUPROPION HYDROCHLORIDE 300 MG: 300 TABLET, FILM COATED, EXTENDED RELEASE ORAL at 08:06

## 2017-08-18 RX ADMIN — IBUPROFEN 600 MG: 600 TABLET ORAL at 08:06

## 2017-08-18 RX ADMIN — THERA TABS 1 TABLET: TAB at 08:06

## 2017-08-18 RX ADMIN — OXYCODONE HYDROCHLORIDE 80 MG: 40 TABLET, FILM COATED, EXTENDED RELEASE ORAL at 16:37

## 2017-08-18 RX ADMIN — METHYLPHENIDATE HYDROCHLORIDE 20 MG: 10 TABLET ORAL at 10:27

## 2017-08-18 RX ADMIN — HYDROMORPHONE HYDROCHLORIDE 1 MG: 1 INJECTION, SOLUTION INTRAMUSCULAR; INTRAVENOUS; SUBCUTANEOUS at 10:27

## 2017-08-18 RX ADMIN — HYDROMORPHONE HYDROCHLORIDE 1 MG: 1 INJECTION, SOLUTION INTRAMUSCULAR; INTRAVENOUS; SUBCUTANEOUS at 06:50

## 2017-08-18 NOTE — PROGRESS NOTES
[facility]  Infectious Disease Progress Note          Assessment and Plan:   IMPRESSION:   1.  Carlie Myers is a 61-year-old female with 2 weeks of cough, low-grade fever and left pleuritic chest pain, found to have left pleural fluid, possibly some infiltrate, treated as pneumonia, not resolving, including ongoing fever, now pleural tap is exudative, probably has an empyema.   2.  History of chronic pain syndrome with multiple prior spine surgeries, ongoing pain, currently being evaluated.  No real signs of spine infection.   3.  Prior bilateral knee replacements.   4.  Tobacco abuse.   5 Cfronic embrel wo prior infection issues      RECOMMENDATIONS:   1.  Continue Zosyn. Cx pending likely to be neg on antibiotics  2.  Loculated effusion op noted, doing OK postop  3 Hold embrel about 3 weeks more        Interval History:   no new complaints  feels OK,op noted, exudate, T down, cx neg so far  2 tubes out              Medications:       oxyCODONE (ROXICODONE) IR tablet 30 mg  30 mg Oral 5x Daily     oxyCODONE (OxyCONTIN) 12 hr tablet 80 mg  80 mg Oral Q6H     butalbital-APAP-caffeine-codeine  2 capsule Oral TID     sodium chloride (PF)  3 mL Intracatheter Q8H     bacitracin   Topical TID     piperacillin-tazobactam  3.375 g Intravenous Q6H     dextromethorphan-guaiFENesin  1 tablet Oral BID     diazepam (VALIUM) tablet 5 mg  5 mg Oral TID     gabapentin  800 mg Oral TID     hydrOXYzine  50 mg Oral TID     ibuprofen (ADVIL/MOTRIN) tablet 600 mg  600 mg Oral 4x Daily     methylphenidate  20 mg Oral 4x Daily     multivitamin, therapeutic  1 tablet Oral Daily     omeprazole (priLOSEC) CR capsule 40 mg  40 mg Oral QAM     sertraline (ZOLOFT) tablet 150 mg  150 mg Oral Daily     tiZANidine (ZANAFLEX) tablet 2 mg  2 mg Oral BID     buPROPion (WELLBUTRIN XL) 24 hr tablet 300 mg  300 mg Oral Daily                  Physical Exam:   Blood pressure 136/74, pulse 88, temperature 98.1  F (36.7  C),  "temperature source Oral, resp. rate 18, height 1.651 m (5' 5\"), weight 81.8 kg (180 lb 5.4 oz), SpO2 92 %.  Wt Readings from Last 2 Encounters:   08/18/17 81.8 kg (180 lb 5.4 oz)   10/12/13 95.3 kg (210 lb)     Vital Signs with Ranges  Temp:  [98  F (36.7  C)-98.9  F (37.2  C)] 98.1  F (36.7  C)  Pulse:  [88-97] 88  Heart Rate:  [78-96] 91  Resp:  [16-18] 18  BP: (100-136)/(52-86) 136/74  SpO2:  [90 %-94 %] 92 %    Constitutional: Awake, alert, cooperative, no apparent distress    Lungs: Congestion to auscultation bilaterally, no crackles or wheezing   Cardiovascular: Regular rate and rhythm, normal S1 and S2, and no murmur noted   Abdomen: Normal bowel sounds, soft, non-distended, non-tender   Skin: No rashes, no cyanosis, no edema   Other:           Data:   All microbiology laboratory data reviewed.  Recent Labs   Lab Test  08/18/17   0720  08/17/17   0718  08/16/17   0654   WBC  5.1  3.8*  3.3*   HGB  9.8*  9.6*  8.8*   HCT  29.3*  29.7*  27.0*   MCV  92  93  94   PLT  254  203  191     Recent Labs   Lab Test  08/17/17   0718  08/15/17   0720  08/13/17   1140   CR  0.58  0.51*  0.74     No lab results found.  Recent Labs   Lab Test  08/14/17   1813  08/12/17   0912  08/12/17   0827  08/11/17   1415  08/09/17   1240  08/09/17   1235  08/07/17   0425  08/07/17   0420   CULT  Culture negative after 4 days  Culture received and in progress.  Positive AFB results are called as soon as detected.    Final report to follow in 7 to 8 weeks.    Culture negative monitoring continues  Culture negative monitoring continues  No growth  No growth  No anaerobes isolated  No growth  No growth  No growth  No growth  No growth       "

## 2017-08-18 NOTE — PLAN OF CARE
Problem: Goal Outcome Summary  Goal: Goal Outcome Summary  Outcome: Improving  A&O. VSS. RA. CT x1, to be removed tomorrow. Other two chest tubes removed today. Up with Ax1 walker and gait belt, ambulated x3. Pain managed with scheduled pain meds and dilaudid for break through. IS 1000. Tele SR.

## 2017-08-18 NOTE — PLAN OF CARE
Problem: Goal Outcome Summary  Goal: Goal Outcome Summary  Outcome: Improving  A/o x4  VSS RA. Discouraged about hospital stay length. Chest tubes x3, dressing CDI. No air leak no crepitus. LS diminished. Encouraged IS.  Regular diet minimal appetite. PRN dilaudid x2 for pain. Encouraged aromatherapy to help with anxiety. Sitting up on side of bed x1. Walk in halls- down to elevator x1. No BM. C-diff culture still needed.

## 2017-08-18 NOTE — PROGRESS NOTES
Patient's Home care-- [facility] only provides PCA services, orders should be faxed to [phone]. Patient states she has had RN's through them before and only wants help from [facility]. She also has county workers that help and feels comfortable contacting them if needed upon return home. Patient states the doctor told her she would go home on oral antibiotics. Patient appears much more comfortable today and interactive and is very hopeful for discharge home Saturday to home.

## 2017-08-18 NOTE — PROGRESS NOTES
[facility]    Hospitalist Progress Note    Date of Service (when I saw the patient): 08/18/2017    Assessment & Plan   Carlie Myers is a 61 year old female who was admitted on 8/6/2017. PMH significant for nicotine dependence, neuropathy, psoriatic arthritis, chronic pain syndrome on chronic opiates, and mitral valve prolapse. Patient admitted for CAP and has had prolonged hospital stay due to complicated pleural effusion and pain control.      Sepsis, complicated parapneumonic effusion s/p VATS and decortication 8/14 by Dr. Ramesh:   Diminished cough/inspiratory effort from chronic back pain. UA on admission negative. US 8/8 with small left effusion. Thoracentesis 8/11 with 20ml exudative fluid removed, cytology negative. Strep pneumo and legionella urine Ag negative. CT chest 8/12 with loculated left effusion. Rocephin/Zithromax discontinued and started Zosyn 8/12. RUQ US negative. Bld and pleural cx negative.  Chest tube management per thoracic surgery. Anterior and posterior tubes removed today. Anticipate removing final tube tomorrow.   Infectious disease following. Continue Zosyn. Hold Enbrel x 3 more weeks.  OR cultures pending, but likely to remain negative on antibiotics. Discharge antibiotics per ID.  Encourage IS use, ambulation, wean oxygen as able for sats 92%.   Mucinex BID, prn tessalon     Tobacco use.  Strongly encouraged cessation and patient appears committed. Not requiring a patch at this time.     Loose stools, resolved.    Hypochloremic hyponatremia, resolved.      Acute on chronic back pain  Anxiety:   [PTA on gabapentin, oxycodone, OxyContin, valium, ibuprofen, tizanidine, Fioricet, Wellbutrin, Zolpidem, Sertraline, Hydroxyzine, Ritalin].   Pain exacerbated by chest tubes, pneumonia. Has been taking large amounts of narcotics for 25 years per pt report. Overnight oximetry needing 1 lpm conts, consider OP sleep study if remains hypoxic at time of discharge.   - Appreciate  pain consult (reduced Fioricet to TID)  - c/w home narcotic and anti-anxiety regimen.   - discontinue Dilaudid once chest tubes removed, prn oxycodone  - PT, encourage ambulation  - Monitor closely for respiratory depression/sedation given high doses      Hypertension:  Holding PTA Maxzide given hypotension. May resume tomorrow.      Hypokalemia:  K replacement protocol     DVT Prophylaxis: Pneumatic Compression Devices  Code Status: Full Code     Disposition: Expected discharge in next 1-2 days once all chest tubes removed, on home antibiotic regimen, patient tolerating oral pain regimen and ambulation.     Kaitlin Cross MD    [phone] (P)  Text page (7am to 6pm)    Interval History   Anterior and posterior chest tubes removed. One chest tube remaining. Otherwise, no acute events. Per nursing, patient has been ambulating.    -Data reviewed today: I reviewed all new labs and imaging results over the last 24 hours. I personally reviewed no images or EKG's today.    Physical Exam   Temp: 98.9  F (37.2  C) Temp src: Oral BP: 131/60 Pulse: 97 Heart Rate: 91 Resp: 18 SpO2: 90 % O2 Device: None (Room air)    Vitals:    08/17/17 0600 08/18/17 0640 08/18/17 0829   Weight: 79.4 kg (175 lb 0.7 oz) 81.8 kg (180 lb 5.4 oz) 81.8 kg (180 lb 5.4 oz)     Vital Signs with Ranges  Temp:  [98  F (36.7  C)-98.9  F (37.2  C)] 98.9  F (37.2  C)  Pulse:  [85-97] 97  Heart Rate:  [78-96] 91  Resp:  [16-18] 18  BP: (100-131)/(52-86) 131/60  SpO2:  [90 %-94 %] 90 %  I/O last 3 completed shifts:  In: 750 [P.O.:550; I.V.:200]  Out: 360 [Urine:300; Chest Tube:60]    Constitutional: Somnolent following medications. Oriented x3. No acute distress.  Respiratory: Clear to auscultation in right lung fields with crackles noted in left lung fields. Left lateral chest tube in place with left anterior and posterior having been removed and dressed.  Cardiovascular: Regular rate and rhythm. No murmurs.  GI: Soft, nontender, normoactive bowel  sounds.    Medications        oxyCODONE (ROXICODONE) IR tablet 30 mg  30 mg Oral 5x Daily     oxyCODONE (OxyCONTIN) 12 hr tablet 80 mg  80 mg Oral Q6H     butalbital-APAP-caffeine-codeine  2 capsule Oral TID     sodium chloride (PF)  3 mL Intracatheter Q8H     bacitracin   Topical TID     piperacillin-tazobactam  3.375 g Intravenous Q6H     dextromethorphan-guaiFENesin  1 tablet Oral BID     diazepam (VALIUM) tablet 5 mg  5 mg Oral TID     gabapentin  800 mg Oral TID     hydrOXYzine  50 mg Oral TID     ibuprofen (ADVIL/MOTRIN) tablet 600 mg  600 mg Oral 4x Daily     methylphenidate  20 mg Oral 4x Daily     multivitamin, therapeutic  1 tablet Oral Daily     omeprazole (priLOSEC) CR capsule 40 mg  40 mg Oral QAM     sertraline (ZOLOFT) tablet 150 mg  150 mg Oral Daily     tiZANidine (ZANAFLEX) tablet 2 mg  2 mg Oral BID     buPROPion (WELLBUTRIN XL) 24 hr tablet 300 mg  300 mg Oral Daily       Data     Recent Labs  Lab 08/18/17  0720 08/17/17  0718 08/16/17  0654 08/15/17  0720  08/13/17  1140   WBC 5.1 3.8* 3.3* 2.3*  < > 2.5*   HGB 9.8* 9.6* 8.8* 9.2*  < > 10.7*   MCV 92 93 94 95  < > 94    203 191 176  < > 188   INR  --   --   --   --   --  1.09   NA  --  139  --  136  --  135   POTASSIUM 3.4 3.2*  --  3.7  --  3.8   CHLORIDE  --  101  --  100  --  98   CO2  --  32  --  30  --  29   BUN  --  7  --  7  --  13   CR  --  0.58  --  0.51*  --  0.74   ANIONGAP  --  6  --  6  --  8   BENNY  --  8.4*  --  8.3*  --  8.6   GLC  --  90  --  87  --  92   < > = values in this interval not displayed.    Imaging:  Recent Results (from the past 24 hour(s))   XR Chest Port 1 View    Narrative    CHEST ONE VIEW PORTABLE August 18, 2017 5:20 AM     HISTORY: Post procedure pleural effusion.    COMPARISON: 8/17/2017.      Impression    IMPRESSION: Left chest tubes, no pneumothorax. Elevated left  diaphragm. Patchy infiltrate above the left base. Right lung is clear.  Normal heart size. Previous anterior cervical spinal  fusion.    JUSTA MACDONALD MD

## 2017-08-18 NOTE — PLAN OF CARE
Problem: Goal Outcome Summary  Goal: Goal Outcome Summary  Outcome: Therapy, progress toward functional goals as expected  Patient plan for discharge: Home with family assist.  Current status: Pt requires SBA for bed mobility. Pt performed sit to/from stand with FWW with SBA. On/off toilet with SBA. Pt very discouraged with loose stools again. Pt ambulated 250 ft x 1 with FWW with SBA. Pt returned supine at end of session.  Barriers to return to prior living situation: Decreased activity tolerance, poor safety awareness, stairs not yet assessed  Recommendations for discharge: per the plan established by the Physical Therapist, anticipate that patient will be able to discharge to home with 24 hour care/assistance from family (need to ensure that family is able to provide) and home health PT and OT; may require rent/purchase of FWW. If family unable to provide 24 hour care/assistance, recommend discharge to TCU.  Rationale for recommendations: Pt progressing well with mobility and would be safe to discharge home if support is available. Pt is discouraged with loose stools.

## 2017-08-18 NOTE — PROGRESS NOTES
CLINICAL NUTRITION SERVICES - REASSESSMENT NOTE      EVALUATION OF PROGRESS TOWARD GOALS   Diet:  Regular, Ensure once daily    Intake:   -Appetite poor but pt making an effort to take PO. Pt states that she is consuming 50% of meals ordered. Pt also states that she has had some decreased PO due to loose stools yesterday  -Per RN flowsheet, pt consuming 50% of meals ordered  -BM x 1 (8/18)    Previous Goals:   Pt to consume at least 75% of meals ordered TID and oral nutrition supplements   Evaluation: Not met    Previous Nutrition Diagnosis:   Inadequate oral intake related to decreased appetite and overall medical status as evidenced by pt reports of poor appetite and reduced PO intake by 50% when compared to baseline   Evaluation: No change      CURRENT NUTRITION DIAGNOSIS  Inadequate oral intake related to decreased appetite, loose stools and overall medical status as evidenced by pt reports of poor appetite and reduced PO intake by 50% when compared to baseline    INTERVENTIONS  Recommendations / Nutrition Prescription  Continue diet as ordered   Continue Ensure as ordered     Implementation  None at this time    Goals  Pt to consume at least 75% of meals ordered TID and oral nutrition supplements       MONITORING AND EVALUATION:  Progress towards goals will be monitored and evaluated per protocol and Practice Guidelines      Elisa Davis RD, LD   Discharged

## 2017-08-18 NOTE — PLAN OF CARE
Problem: Lung Surgery (via Thoracotomy) (Adult)  Goal: Signs and Symptoms of Listed Potential Problems Will be Absent or Manageable (Lung Surgery)  Signs and symptoms of listed potential problems will be absent or manageable by discharge/transition of care (reference Lung Surgery (via Thoracotomy) (Adult) CPG).   Outcome: Improving  Patient is alert and orientated X 4, up with assist of one to commode, vital signs stable, pain manage with PRN Dilaudid and Oxycodone, lungs sound diminished, encouraged IS.    Chest tubes x3, No air leak no crepitus. Dressing to incision  CDI. Tele:

## 2017-08-18 NOTE — PROGRESS NOTES
Thoracic Surgery POD #4:  /60  Pulse 97  Temp 98.9  F (37.2  C) (Oral)  Resp 18  Wt 81.8 kg (180 lb 5.4 oz)  SpO2 90%  BMI 30.01 kg/m2  CXR:  OK, no PTX  CT:  Minimal output  S:  Doing ok- lots of back pain- walked yesterday- discussed CTs.  O:  Inc.: benign, dry, no redness  CTs:  Removed anterior and posterior without complication.  Occlusive dressing applied  P:  Anticipate removing final CT tomorrow  Tonya Becker PA-C with Dr. Du [facility]  Cell [phone]

## 2017-08-19 ENCOUNTER — APPOINTMENT (OUTPATIENT)
Dept: GENERAL RADIOLOGY | Facility: CLINIC | Age: 62
DRG: 853 | End: 2017-08-19
Attending: PHYSICIAN ASSISTANT
Payer: MEDICARE

## 2017-08-19 VITALS
HEIGHT: 65 IN | OXYGEN SATURATION: 91 % | RESPIRATION RATE: 16 BRPM | HEART RATE: 98 BPM | DIASTOLIC BLOOD PRESSURE: 72 MMHG | BODY MASS INDEX: 28.83 KG/M2 | SYSTOLIC BLOOD PRESSURE: 147 MMHG | TEMPERATURE: 98.8 F | WEIGHT: 173.06 LBS

## 2017-08-19 PROBLEM — I10 HYPERTENSION, ESSENTIAL, BENIGN: Status: ACTIVE | Noted: 2017-08-19

## 2017-08-19 PROBLEM — Z72.0 TOBACCO USE: Status: ACTIVE | Noted: 2017-08-19

## 2017-08-19 PROBLEM — E87.6 HYPOKALEMIA: Status: ACTIVE | Noted: 2017-08-19

## 2017-08-19 PROBLEM — L40.50 PSORIATIC ARTHRITIS (H): Status: ACTIVE | Noted: 2017-08-19

## 2017-08-19 LAB
BACTERIA SPEC CULT: NO GROWTH
MAGNESIUM SERPL-MCNC: 1.9 MG/DL (ref 1.6–2.3)
PHOSPHATE SERPL-MCNC: 2.9 MG/DL (ref 2.5–4.5)
POTASSIUM SERPL-SCNC: 3.2 MMOL/L (ref 3.4–5.3)
SPECIMEN SOURCE: NORMAL

## 2017-08-19 PROCEDURE — A9270 NON-COVERED ITEM OR SERVICE: HCPCS | Mod: GY | Performed by: INTERNAL MEDICINE

## 2017-08-19 PROCEDURE — 25000132 ZZH RX MED GY IP 250 OP 250 PS 637: Mod: GY | Performed by: INTERNAL MEDICINE

## 2017-08-19 PROCEDURE — 84100 ASSAY OF PHOSPHORUS: CPT | Performed by: INTERNAL MEDICINE

## 2017-08-19 PROCEDURE — 99239 HOSP IP/OBS DSCHRG MGMT >30: CPT | Performed by: INTERNAL MEDICINE

## 2017-08-19 PROCEDURE — 25000128 H RX IP 250 OP 636: Performed by: INTERNAL MEDICINE

## 2017-08-19 PROCEDURE — 83735 ASSAY OF MAGNESIUM: CPT | Performed by: INTERNAL MEDICINE

## 2017-08-19 PROCEDURE — A9270 NON-COVERED ITEM OR SERVICE: HCPCS | Mod: GY | Performed by: PHYSICIAN ASSISTANT

## 2017-08-19 PROCEDURE — 25000132 ZZH RX MED GY IP 250 OP 250 PS 637: Mod: GY | Performed by: PHYSICIAN ASSISTANT

## 2017-08-19 PROCEDURE — 84132 ASSAY OF SERUM POTASSIUM: CPT | Performed by: INTERNAL MEDICINE

## 2017-08-19 PROCEDURE — 71010 XR CHEST PORT 1 VW: CPT

## 2017-08-19 PROCEDURE — 36415 COLL VENOUS BLD VENIPUNCTURE: CPT | Performed by: INTERNAL MEDICINE

## 2017-08-19 RX ADMIN — OXYCODONE HYDROCHLORIDE 30 MG: 15 TABLET ORAL at 03:42

## 2017-08-19 RX ADMIN — PIPERACILLIN SODIUM,TAZOBACTAM SODIUM 3.38 G: 3; .375 INJECTION, POWDER, FOR SOLUTION INTRAVENOUS at 07:51

## 2017-08-19 RX ADMIN — BUTALBITAL, ACETAMINOPHEN, CAFFEINE, AND CODEINE PHOSPHATE 2 CAPSULE: 30; 50; 40; 325 CAPSULE ORAL at 08:57

## 2017-08-19 RX ADMIN — POTASSIUM CHLORIDE 40 MEQ: 1500 TABLET, EXTENDED RELEASE ORAL at 08:56

## 2017-08-19 RX ADMIN — OXYCODONE HYDROCHLORIDE 80 MG: 40 TABLET, FILM COATED, EXTENDED RELEASE ORAL at 10:16

## 2017-08-19 RX ADMIN — METHYLPHENIDATE HYDROCHLORIDE 20 MG: 10 TABLET ORAL at 10:16

## 2017-08-19 RX ADMIN — METHYLPHENIDATE HYDROCHLORIDE 20 MG: 10 TABLET ORAL at 14:31

## 2017-08-19 RX ADMIN — PIPERACILLIN SODIUM,TAZOBACTAM SODIUM 3.38 G: 3; .375 INJECTION, POWDER, FOR SOLUTION INTRAVENOUS at 14:31

## 2017-08-19 RX ADMIN — GUAIFENESIN AND DEXTROMETHORPHAN HYDROBROMIDE 1 TABLET: 600; 30 TABLET, EXTENDED RELEASE ORAL at 08:59

## 2017-08-19 RX ADMIN — OXYCODONE HYDROCHLORIDE 30 MG: 15 TABLET ORAL at 13:00

## 2017-08-19 RX ADMIN — OXYCODONE HYDROCHLORIDE 15 MG: 5 TABLET ORAL at 01:44

## 2017-08-19 RX ADMIN — DIAZEPAM 5 MG: 5 TABLET ORAL at 08:58

## 2017-08-19 RX ADMIN — OMEPRAZOLE 40 MG: 20 CAPSULE, DELAYED RELEASE ORAL at 08:59

## 2017-08-19 RX ADMIN — SERTRALINE HYDROCHLORIDE 150 MG: 100 TABLET ORAL at 08:57

## 2017-08-19 RX ADMIN — IBUPROFEN 600 MG: 600 TABLET ORAL at 08:57

## 2017-08-19 RX ADMIN — METHYLPHENIDATE HYDROCHLORIDE 20 MG: 10 TABLET ORAL at 06:20

## 2017-08-19 RX ADMIN — HYDROXYZINE HYDROCHLORIDE 50 MG: 25 TABLET ORAL at 08:57

## 2017-08-19 RX ADMIN — PIPERACILLIN SODIUM,TAZOBACTAM SODIUM 3.38 G: 3; .375 INJECTION, POWDER, FOR SOLUTION INTRAVENOUS at 01:45

## 2017-08-19 RX ADMIN — OXYCODONE HYDROCHLORIDE 30 MG: 15 TABLET ORAL at 07:50

## 2017-08-19 RX ADMIN — POTASSIUM CHLORIDE 20 MEQ: 1500 TABLET, EXTENDED RELEASE ORAL at 11:03

## 2017-08-19 RX ADMIN — TIZANIDINE 2 MG: 2 TABLET ORAL at 08:58

## 2017-08-19 RX ADMIN — IBUPROFEN 600 MG: 600 TABLET ORAL at 13:00

## 2017-08-19 RX ADMIN — GABAPENTIN 800 MG: 800 TABLET, FILM COATED ORAL at 08:58

## 2017-08-19 RX ADMIN — THERA TABS 1 TABLET: TAB at 08:59

## 2017-08-19 RX ADMIN — BUPROPION HYDROCHLORIDE 300 MG: 300 TABLET, FILM COATED, EXTENDED RELEASE ORAL at 08:58

## 2017-08-19 RX ADMIN — OXYCODONE HYDROCHLORIDE 80 MG: 40 TABLET, FILM COATED, EXTENDED RELEASE ORAL at 03:42

## 2017-08-19 NOTE — PLAN OF CARE
Problem: Discharge Planning  Goal: Discharge Planning (Adult, OB, Behavioral, Peds)  Outcome: Completed Date Met:  08/19/17  Pt discharged to home via private car. Pt and family state understanding of discharge instructions. Pt's daughter is the full time caregiver for pt. Prescriptions sent to outside pharmacy for family to pickup.

## 2017-08-19 NOTE — PLAN OF CARE
Problem: Goal Outcome Summary  Goal: Goal Outcome Summary  A/O x4. VSS.Tele, NSR. L chest incision with one CT, zero output, scant SS drainage on the site,drsg reinforced. LS diminished, IS 1000.Pain managed with leonela meds and PRN dilaudid. Pt concerned about her meds that are with security, but writer informed pt that her meds are safe and pt has a copy showing the meds she came with from home.

## 2017-08-19 NOTE — PLAN OF CARE
Problem: Goal Outcome Summary  Goal: Goal Outcome Summary  PT: Attempted AM PT session; pt currently using the bathroom and requests increased time. Pt also states she will be discharging home today and has no further questions/concerns related to PT.

## 2017-08-19 NOTE — PLAN OF CARE
Problem: Goal Outcome Summary  Goal: Goal Outcome Summary  Outcome: No Change  A&O. VSS. Took 15 mg of oxycodone once for breakthrough back pain. LS clear. 30 ml out of CT drainage over last 8 hours. 1 small episode of loose stools. Up with SBA and walker. Tele NSR.

## 2017-08-19 NOTE — PROGRESS NOTES
THORACIC SURGERY POD # 5    Doing well  Off O2  Minimal CT output    CXR left pleural space well drained    Last CT out    D/c today as per hospitalist    F/u my office 08-28    STEVE BARTLETT MD [facility] ONCOLOGY THORACIC SURGERY  CELL:  [phone]  OFFICE: [phone]

## 2017-08-19 NOTE — DISCHARGE INSTRUCTIONS
"[facility]  Discharge Orders & Follow-up Care  Video-Assisted: Thoracoscopy - Thoracotomy    Call Ragini at Dr. Ramesh  office at [phone] to make a return appointment with a chest x-ray on Monday 08-28.  Your appointment will be with either Tonya Becker PA-C or Mary Gilbert PA-C, or Dr. Ramesh.      Our office is located at:  [facility], [address] [place] [address], [address], [place] [address].  Ragini will explain where to park when you call for an appointment.    A. Patient Care  Call Dr Ramesh  office @ [phone] if:  ? Severe chills or a fever or 100.4 F.  temperature on two occasions  ? Increased incisional pain and/or redness  ? Presence of unusual incisional or chest tube site drainage  ? Coughing up bright red blood or greenish-yellow secretions  ? Significant increase in shortness of breath    Pain Relief  You have been given a prescription for narcotic pain medicine.  You may also take ibuprofen and acetaminophen over the counter.  Recommended dosages are:  600 mg Ibuprofen every 6 hours as needed and 650 mg Acetaminophen every 4 hours as needed.  Many patients get good pain relief by \"staggering\" these medications.     No driving while on narcotics.     Activity  XX No activity restrictions for a scope procedure/thoracoscopy  ___ No heavy lifting greater than 8-10 pounds on the operative side for 4-6 weeks for a thoracotomy    Wound Care  Remove all dressings in 48 hours and then you may shower.  Wash the incision and chest tube site(s) daily with soap and water. No bathing or immersing incision underwater for approximately 2 weeks or until the chest tube sites are completely healed.     Place a dry gauze dressing over the chest tube site(s) because it(they) will drain a few days.  Do not be alarmed if there is drainage of a large amount of fluid (should be pink or yellow-- or somewhat bloody) either spontaneously or with coughing or exertion. If this happens, just " place a large dry gauze dressing over the chest tube site-- it will stop and scab over in about a week or so. Once the drainage stops, then leave the chest tube site(s) open to air.     White steri strips will be present on the incision(s). They will peel off within about 10 days-- otherwise, they will be removed at your post-op appointment.     B. Respiratory  XX Utilize Incentive spirometer 10 times in a row every few hours while awake for a few weeks after discharging home from the hospital

## 2017-08-19 NOTE — PROGRESS NOTES
KELLIE  D:  Pt has orders to discharge home today.  MD has ordered home PT and OT services.  I:  LEXUS met with pt and daughter Jackie.  Pt lives with  who is retired.  Jackie provides pt home PCA services for 24.5 hours per week thru [facility] home care services.  Pt and daughter do not believe [facility] provides home therapy services, just PCA and RN services.  However, pt was adamant she did not want SW calling [facility] to check.  Pt is fine with [facility] providing home care therapies for her however-referral sent.  Per pt agreement, SW [facility]ed [facility] at fax [phone].  P:  SWS no longer following pt as pt left hospital with family.  [facility] to follow pt for therapy at home.

## 2017-08-19 NOTE — PROGRESS NOTES
[facility]  Infectious Disease Progress Note          Assessment and Plan:   IMPRESSION:   1.  Carlie Myers is a 61-year-old female with 2 weeks of cough, low-grade fever and left pleuritic chest pain, found to have left pleural fluid, possibly some infiltrate, treated as pneumonia, not resolving, including ongoing fever, now pleural tap is exudative, probably has an empyema.   2.  History of chronic pain syndrome with multiple prior spine surgeries, ongoing pain, currently being evaluated.  No real signs of spine infection.   3.  Prior bilateral knee replacements.   4.  Tobacco abuse.   5 Cfronic embrel wo prior infection issues      RECOMMENDATIONS:   1. cultures neg, switch to oral Augmentin for 2 weeks on discharge.   2.  Loculated effusion op noted, doing OK postop, all tubes out  3 Hold embrel about 3 weeks more        Interval History:   no new complaints  feels OK,op noted, exudate, T down, cx neg so far  all tubes out              Medications:       oxyCODONE (ROXICODONE) IR tablet 30 mg  30 mg Oral 5x Daily     oxyCODONE (OxyCONTIN) 12 hr tablet 80 mg  80 mg Oral Q6H     butalbital-APAP-caffeine-codeine  2 capsule Oral TID     sodium chloride (PF)  3 mL Intracatheter Q8H     bacitracin   Topical TID     piperacillin-tazobactam  3.375 g Intravenous Q6H     dextromethorphan-guaiFENesin  1 tablet Oral BID     diazepam (VALIUM) tablet 5 mg  5 mg Oral TID     gabapentin  800 mg Oral TID     hydrOXYzine  50 mg Oral TID     ibuprofen (ADVIL/MOTRIN) tablet 600 mg  600 mg Oral 4x Daily     methylphenidate  20 mg Oral 4x Daily     multivitamin, therapeutic  1 tablet Oral Daily     omeprazole (priLOSEC) CR capsule 40 mg  40 mg Oral QAM     sertraline (ZOLOFT) tablet 150 mg  150 mg Oral Daily     tiZANidine (ZANAFLEX) tablet 2 mg  2 mg Oral BID     buPROPion (WELLBUTRIN XL) 24 hr tablet 300 mg  300 mg Oral Daily                  Physical Exam:   Blood pressure 133/71, pulse 98, temperature 98  " F (36.7  C), temperature source Oral, resp. rate 22, height 1.651 m (5' 5\"), weight 78.5 kg (173 lb 1 oz), SpO2 91 %.  Wt Readings from Last 2 Encounters:   08/19/17 78.5 kg (173 lb 1 oz)   10/12/13 95.3 kg (210 lb)     Vital Signs with Ranges  Temp:  [97.7  F (36.5  C)-98.7  F (37.1  C)] 98  F (36.7  C)  Pulse:  [74-98] 98  Heart Rate:  [74-91] 80  Resp:  [18-22] 22  BP: (114-136)/(63-81) 133/71  SpO2:  [90 %-93 %] 91 %    Constitutional: Awake, alert, cooperative, no apparent distress    Lungs: Congestion to auscultation bilaterally, no crackles or wheezing   Cardiovascular: Regular rate and rhythm, normal S1 and S2, and no murmur noted   Abdomen: Normal bowel sounds, soft, non-distended, non-tender   Skin: No rashes, no cyanosis, no edema   Other:           Data:   All microbiology laboratory data reviewed.  Recent Labs   Lab Test  08/18/17   0720  08/17/17   0718  08/16/17   0654   WBC  5.1  3.8*  3.3*   HGB  9.8*  9.6*  8.8*   HCT  29.3*  29.7*  27.0*   MCV  92  93  94   PLT  254  203  191     Recent Labs   Lab Test  08/17/17   0718  08/15/17   0720  08/13/17   1140   CR  0.58  0.51*  0.74     No lab results found.  Recent Labs   Lab Test  08/14/17   1813  08/12/17   0912  08/12/17   0827  08/11/17   1415  08/09/17   1240  08/09/17   1235  08/07/17   0425  08/07/17   0420   CULT  Culture negative after 4 days  Culture received and in progress.  Positive AFB results are called as soon as detected.    Final report to follow in 7 to 8 weeks.    Culture negative monitoring continues  Culture negative monitoring continues  No growth  No growth  No anaerobes isolated  No growth  No growth  No growth  No growth  No growth       "

## 2017-08-19 NOTE — DISCHARGE SUMMARY
[facility]    Discharge Summary  Hospitalist    Date of Admission:  8/6/2017  Date of Discharge:  8/19/2017  Discharging Provider: Kaitlin Cross MD    Discharge Diagnoses   Sepsis  Parapneumonic effusion  Tobacco use  Psoriatic arthritis  Hypertension  Hypokalemia  Hyponatremia, resolved    History of Present Illness   Carlie Myers is an 61 year old female with PMH significant for nicotine dependence, neuropathy, psoriatic arthritis, chronic pain syndrome on chronic opiates, and mitral valve prolapse. Patient admitted for CAP and has had prolonged hospital stay due to complicated pleural effusion and pain control.    Hospital Course   Carlie Myers was admitted on 8/6/2017.  The following problems were addressed during her hospitalization:    Active Problems:    Sepsis (H)    Parapneumonic effusion    Tobacco use    Psoriatic arthritis (H)    Hypertension, essential, benign    Hypokalemia    Sepsis, complicated parapneumonic effusion s/p VATS and decortication 8/14 by Dr. Ramesh:   Diminished cough/inspiratory effort from chronic back pain. UA on admission negative. US 8/8 with small left effusion. Thoracentesis 8/11 with 20ml exudative fluid removed, cytology negative. Strep pneumo and legionella urine Ag negative. CT chest 8/12 with loculated left effusion. Rocephin/Zithromax discontinued and started Zosyn 8/12. RUQ US negative. Bld and pleural cx negative.  Chest tube management per thoracic surgery. Anterior and posterior tubes removed 8/18. Final, lateral left chest tube removed today, 8/19.   Infectious disease followed during stay. Continued Zosyn during admission and recommended discharging on Augmentin BID x 14 days. Recommended that Enbrel be held x 3 more weeks.  No oxygen requirements. Recommend ongoing ambulation and IS as able.   Home health PT/OT ordered on discharge.  Will also continue Mucinex BID on discharge and this can be followed by PCP / Dr. Ramesh.  Follow up in  Dr. Ramesh office on 8/28.   Follow up with primary care in next week and with rheumatologist in next 2-3 weeks.      Tobacco use.  Strongly encouraged cessation and patient appears committed. Has not required patch during stay and patient states she will be fine in the short term as she will not have any cravings given current pulmonary status. Patient planning to discuss further with primary care in next week. Family supportive.      Loose stools, resolved.     Hypochloremic hyponatremia, resolved.  Consider changing from HCTZ as home med, will defer to primary care.      Acute on chronic back pain  Anxiety:   [PTA on gabapentin, oxycodone, OxyContin, valium, ibuprofen, tizanidine, Fioricet, Wellbutrin, Zolpidem, Sertraline, Hydroxyzine, Ritalin].   Pain exacerbated by chest tubes, pneumonia. Has been taking large amounts of narcotics for 25 years per pt report.   - Appreciate pain consult (reduced Fioricet to TID)  - c/w home narcotic and anti-anxiety regimen.   - discontinued Dilaudid once chest tubes removed, prn oxycodone  - PT, encourage ambulation  - Monitor closely for respiratory depression/sedation given high doses      Hypertension:  Held her home Maxzide during stay given hypotension. No longer hypotensive, but low normal. Note that patient was also hyponatremic earlier in stay. Will allow primary care to follow and resume this or new antihypertensive as indicated.      Hypokalemia: K replaced during stay, though patient states this is chronic. Follow BMP outpatient.    Psoriatic arthritis: Patient to hold Enbrel x 3 weeks and follow with rheumatologist in next 2-3 weeks.      Kaitlin Cross MD    Significant Results and Procedures   Imaging findings as below.    Thoracentesis 8/11  VATS and decortication with three chest tubes placed 8/14 by Dr. Ramesh    Pending Results   These results will be followed up by primary care and Dr. Ramesh.    Unresulted Labs Ordered in the Past 30 Days of this  Admission     Date and Time Order Name Status Description    8/14/2017 1842 Fungus Culture, non-blood Preliminary     8/14/2017 1842 Anaerobic bacterial culture Preliminary     8/14/2017 1842 AFB Culture Non Blood Preliminary     8/7/2017 0143 Cytology In process           Code Status   Full Code       Primary Care Physician   Tej Loyola    Physical Exam   Temp: 98.8  F (37.1  C) Temp src: Oral BP: 147/72 Pulse: 98 Heart Rate: 87 Resp: 16 SpO2: 91 % O2 Device: None (Room air)    Vitals:    08/18/17 0640 08/18/17 0829 08/19/17 0600   Weight: 81.8 kg (180 lb 5.4 oz) 81.8 kg (180 lb 5.4 oz) 78.5 kg (173 lb 1 oz)     Vital Signs with Ranges  Temp:  [97.7  F (36.5  C)-98.8  F (37.1  C)] 98.8  F (37.1  C)  Pulse:  [74-98] 98  Heart Rate:  [74-91] 87  Resp:  [16-22] 16  BP: (114-147)/(63-81) 147/72  SpO2:  [90 %-93 %] 91 %  I/O last 3 completed shifts:  In: 420 [P.O.:420]  Out: 30 [Chest Tube:30]    Constitutional: Alert, oriented x3. Family at bedside. No acute distress.   Respiratory: Clear to auscultation bilaterally with only soft crackles noted at left base. All three left-sided chest tubes removed with clean dressings in place. No wheezes.   Cardiovascular: Regular rate and rhythm. No murmurs.  GI: Soft, nontender. Nondistended. Normoactive bowel sounds.     Discharge Disposition   Discharged to home  Condition at discharge: Stable    Consultations This Hospital Stay   SWALLOW EVAL SPEECH PATH AT BEDSIDE IP CONSULT  INFECTIOUS DISEASES IP CONSULT  CARDIOTHORACIC SURGERY IP CONSULT  THORACIC SURGERY IP CONSULT  PAIN MANAGEMENT IP CONSULT  PHYSICAL THERAPY ADULT IP CONSULT    Time Spent on this Encounter   IKaitlin, personally saw the patient today and spent greater than 30 minutes discharging this patient.    Discharge Orders     Home Care PT Referral for Hospital Discharge     Home Care OT Referral for Hospital Discharge     Reason for your hospital stay   Parapneumonic effusion and sepsis with  3 chest tubes placed     Activity   Your activity upon discharge: activity as tolerated     MD face to face encounter   Documentation of Face to Face and Certification for Home Health Services    I certify that patient: Carlie Myers is under my care and that I, or a nurse practitioner or physician's assistant working with me, had a face-to-face encounter that meets the physician face-to-face encounter requirements with this patient on: 8/19/2017.    This encounter with the patient was in whole, or in part, for the following medical condition, which is the primary reason for home health care: deconditioning.    I certify that, based on my findings, the following services are medically necessary home health services: Occupational Therapy and Physical Therapy.    My clinical findings support the need for the above services because: Occupational Therapy Services are needed to assess and treat cognitive ability and address ADL safety due to impairment in ambulation. Physical Therapy Services are needed to assess and treat the following functional impairments: deconditioning, balance and ambulation deficits.    Further, I certify that my clinical findings support that this patient is homebound (i.e. absences from home require considerable and taxing effort and are for medical reasons or [employer] services or infrequently or of short duration when for other reasons).   Based on the above findings. I certify that this patient is confined to the home and needs intermittent physical therapy and occupational therapy.  The patient is under my care, and I have initiated the establishment of the plan of care.  This patient will be followed by a physician who will periodically review the plan of care.  Physician/Provider to provide follow up care: Tej Loyola    Attending hospital physician (the Medicare certified [facility] provider): Kaitlin Cross  Physician Signature: See electronic signature associated with these  discharge orders.  Date: 8/19/2017     Follow-up and recommended labs and tests    Please follow with Dr. Tej Loyola at [facility] within 7 days. Please call to schedule this appointment [phone]. Recommend that BMP be rechecked and blood pressure medications be reviewed.  Follow with your rheumatologist in the next 2-3 weeks. Hold Enbrel x 3 more weeks and discuss with rheumatologist before resuming.  Follow up with Dr. Ramesh (Thoracic surgeon) in at [facility] on 8/28. Call office at [phone] for appointment time.     Full Code     Diet   Follow this diet upon discharge: Orders Placed This Encounter     Snacks/Supplements Adult: Ensure Plus (Adult); Between Meals     Regular Diet Adult       Discharge Medications   Current Discharge Medication List      START taking these medications    Details   dextromethorphan-guaiFENesin (MUCINEX DM)  MG per 12 hr tablet Take 1 tablet by mouth 2 times daily  Qty: 28 tablet, Refills: 0    Associated Diagnoses: Parapneumonic effusion      amoxicillin-clavulanate (AUGMENTIN) 875-125 MG per tablet Take 1 tablet by mouth 2 times daily for 14 days . Take first dose evening of 8/19.  Qty: 28 tablet, Refills: 0    Associated Diagnoses: Parapneumonic effusion         CONTINUE these medications which have NOT CHANGED    Details   OXYCODONE HCL PO Take 30 mg by mouth 5 times daily      Sertraline HCl (ZOLOFT PO) Take 150 mg by mouth daily      BuPROPion HCl (WELLBUTRIN XL PO) Take 300 mg by mouth daily      Zolpidem Tartrate (AMBIEN CR PO) Take 12.5 mg by mouth At Bedtime      OxyCODONE HCl (OXYCONTIN PO) Take 80 mg by mouth 4 times daily       clobetasol (TEMOVATE) 0.05 % cream Apply topically 2 times daily as needed (psoriasis)       DiazePAM (VALIUM PO) Take 5 mg by mouth 3 times daily      GABAPENTIN PO Take 800 mg by mouth 3 times daily      HYDROXYZINE PAMOATE PO Take 50 mg by mouth every 8 hours      IBUPROFEN PO Take 600  mg by mouth 4 times daily      Methylphenidate HCl (RITALIN PO) Take 20 mg by mouth 4 times daily      multivitamin, therapeutic (THERA-VIT) TABS tablet Take 1 tablet by mouth daily      OMEPRAZOLE PO Take 40 mg by mouth every morning      TiZANidine HCl (ZANAFLEX PO) Take 2 mg by mouth 2 times daily      AMOXICILLIN PO Take 500 mg by mouth daily Take 4 tabs before and after dental appointments due to mitral valve prolapse      butalbital-APAP-caffeine-codeine (FIORICET WITH CODEINE) -48-30 MG per capsule Take 2 capsules by mouth every 6 hours as needed          STOP taking these medications       etanercept (ENBREL) 50 MG/ML injection Comments:   Reason for Stopping:         triamterene-hydrochlorothiazide (MAXZIDE-25) 37.5-25 MG per tablet Comments:   Reason for Stopping:         Zolpidem Tartrate (AMBIEN PO) Comments:   Reason for Stopping:             Allergies   No Known Allergies  Data   Most Recent 3 CBC's:  Recent Labs   Lab Test  08/18/17   0720 08/17/17 0718 08/16/17   0654   WBC  5.1  3.8*  3.3*   HGB  9.8*  9.6*  8.8*   MCV  92  93  94   PLT  254  203  191      Most Recent 3 BMP's:  Recent Labs   Lab Test  08/19/17   0757  08/18/17   0720 08/17/17   0718  08/15/17   0720 08/13/17   1140   NA   --    --   139  136  135   POTASSIUM  3.2*  3.4  3.2*  3.7  3.8   CHLORIDE   --    --   101  100  98   CO2   --    --   32  30  29   BUN   --    --   7  7  13   CR   --    --   0.58  0.51*  0.74   ANIONGAP   --    --   6  6  8   BENNY   --    --   8.4*  8.3*  8.6   GLC   --    --   90  87  92     Most Recent 2 LFT's:  Recent Labs   Lab Test  08/09/17 0717 08/07/17   0105   AST  31  21   ALT  53*  66*   ALKPHOS  114  100   BILITOTAL  0.2  0.3     Most Recent INR's and Anticoagulation Dosing History:  Anticoagulation Dose History     Recent Dosing and Labs Latest Ref Rng & Units 3/17/2008 3/18/2008 3/19/2008 3/20/2008 8/7/2017 8/13/2017    INR 0.86 - 1.14 1.00 1.25(H) 1.61(H) 1.41(H) 1.10 1.09         Most Recent 3 Troponin's:  Recent Labs   Lab Test  08/09/17   0717  08/07/17   0105   TROPI  <0.015  The 99th percentile for upper reference range is 0.045 ug/L.  Troponin values in   the range of 0.045 - 0.120 ug/L may be associated with risks of adverse   clinical events.    <0.015  The 99th percentile for upper reference range is 0.045 ug/L.  Troponin values in   the range of 0.045 - 0.120 ug/L may be associated with risks of adverse   clinical events.       Most Recent Cholesterol Panel:No lab results found.  Most Recent 6 Bacteria Isolates From Any Culture (See EPIC Reports for Culture Details):  Recent Labs   Lab Test  08/14/17   1813  08/12/17   0912  08/12/17   0827  08/11/17   1415  08/09/17   1240  08/09/17   1235   CULT  No growth  Culture negative after 4 days  Culture received and in progress.  Positive AFB results are called as soon as detected.    Final report to follow in 7 to 8 weeks.    Culture negative monitoring continues  No growth  No growth  No anaerobes isolated  No growth  No growth  No growth     Most Recent TSH, T4 and A1c Labs:  Recent Labs   Lab Test  08/11/17   1545   TSH  0.42     Results for orders placed or performed during the hospital encounter of 08/06/17   XR Chest Port 1 View    Narrative    XR CHEST PORT 1 VIEW   8/7/2017 1:03 AM     INDICATION: Cough, shortness of breath, ? pneumonia.    COMPARISON: None.      Impression    IMPRESSION: Moderate left pleural effusion and associated left mid and  lower lung airspace opacity which may be atelectasis and/or  infiltrate. Probable elevation left hemidiaphragm. Right lung is  clear. Postoperative changes in the lower cervical spine.    SANGITA CARSON MD   US Chest Pleural Effusion Imaging    Narrative    ULTRASOUND CHEST/PLEURAL EFFUSION IMAGING   8/8/2017 1:51 PM     HISTORY: Left side.    COMPARISON: Chest x-ray 8/7/2017      Impression    IMPRESSION: There is a small left pleural effusion.    IRIS HARRIS MD   XR  Chest Port 1 View    Narrative    CHEST PORTABLE ONE VIEW August 10, 2017 8:10 AM     HISTORY: Interval.    COMPARISON: 8/7/2017.      Impression    IMPRESSION: Abnormal airspace consolidation in the left lower lobe  appears to be associated with a small effusion. This is not  significantly changed compared to the prior study. Right lung is  clear. Heart size obscured.    IRIS HARRIS MD   US Thoracentesis    Narrative    EXAM: US THORACENTESIS       8/11/2017 2:26 PM       HISTORY: Pleural effusion. Request for diagnostic thoracentesis.      PROCEDURE:  Written informed consent was obtained from the patient  prior to the procedure. The risks and benefits including bleeding,  infection and pneumothorax were discussed and the patient wished to  continue. Initial ultrasound images demonstrated a left pleural fluid  collection. The skin overlying this collection was marked, prepped,  draped and anesthetized in usual sterile fashion utilizing 5 mL of  lidocaine 1%. Thoracentesis catheter was then placed into the pleural  fluid collection with return of 20 mL of dark gill-colored fluid.  Patient tolerated the procedure well. Followup chest x-ray was  ordered.      US guidance was utilized to enter the left pleural space for  thoracentesis with permanent image recoding.      Impression    IMPRESSION:  Ultrasound-guided left thoracentesis.     JOSE URBANO PA-C   XR Chest 1 View    Narrative    XR CHEST 1 VW 8/11/2017 2:23 PM    HISTORY: Thoracentesis.    COMPARISON: August 10, 2017.      Impression    IMPRESSION: Slight interval decrease in left pleural effusion. Left  basilar atelectasis persists. The right lung remains clear. No  pneumothorax.    SHARDA JOYCE MD   CT Chest w Contrast    Narrative    CT CHEST W CONTRAST 8/12/2017 11:40 AM    HISTORY: Fever.    TECHNIQUE: CT of the chest is performed with IV contrast. 75 mL  Isovue-370 is given intravenously for this study.  Radiation dose for  this scan was  reduced using automated exposure control, adjustment of  the mA and/or kV according to patient size, or iterative  reconstruction technique.    COMPARISON: None.    FINDINGS: There are scattered patchy opacities of the left lower lobe,  suggestive of possible infection. There is a loculated mild to  moderate left effusion with fluid extending into the major fissure.  The right lung is clear. No pneumothorax.    No axillary lymphadenopathy. Scattered shotty subcentimeter  mediastinal lymph nodes.    Visualized portions of the upper abdomen demonstrate scattered  hypodensities in the superior liver, likely small cysts.      Impression    IMPRESSION:    1. Patchy opacities of the left lower lobe, suggestive of infection.  2. Moderate left loculated effusion.    SHARDA JOYCE MD   US Abdomen Limited    Narrative    US ABDOMEN LIMITED 8/12/2017 11:56 AM    HISTORY: Pain.    TECHNIQUE: Limited abdominal ultrasound to the right upper quadrant is  performed.    COMPARISON: None.    FINDINGS: The liver is normal, measuring 17 cm in diameter. Visualized  portions of the pancreas are unremarkable.    Normal gallbladder. No sonographic evidence of gallstones. Normal  gallbladder wall thickness. No sonographic Lantigua sign. The common  hepatic duct measures 8 mm in diameter.    Normal right kidney.        Impression    IMPRESSION:  Normal right upper quadrant ultrasound.      SHARDA JOYCE MD   XR Chest Port 1 View    Narrative    XR CHEST PORT 1 VW 8/14/2017 7:35 PM    COMPARISON: 8/11/2017    HISTORY: Postop chest tube placement.      Impression    IMPRESSION: 3 left-sided chest tubes are seen. No pneumothorax. Likely  very small left pleural effusion. Left basilar consolidation is seen.  Platelike atelectasis at the right base. No pneumothorax on either  side.    JOSÉ MIGUEL GUY   XR Chest Port 1 View    Narrative    XR CHEST PORT 1 VW 8/15/2017 5:53 AM    COMPARISON: 8/14/2017    HISTORY: Postprocedural pleural effusion.       Impression    IMPRESSION: 3 left-sided chest tubes remain in place. Possible trace  left basilar pleural effusion with associated  atelectasis/consolidation is not significant change. Platelike  atelectasis in the right mid to lower lung again seen. No pneumothorax  identified on either side.    JOSÉ MIGUEL GUY   XR Chest Port 1 View    Narrative    XR CHEST PORT 1 VW 8/16/2017 4:55 AM    COMPARISON: 8/15/2017    HISTORY: Postprocedural pleural effusion.      Impression    IMPRESSION: 3 left-sided chest tubes remain in place. Asymmetric  elevation of the left hemidiaphragm is again seen with possible trace  pleural fluid on the left. There is unchanged left basilar  atelectasis. Additionally, platelike atelectasis in the right midlung  is unchanged. No pneumothorax on either side.    JOSÉ MIGUEL GUY MD   XR Chest Port 1 View    Narrative    CHEST PORTABLE ONE VIEW August 17, 2017 5:47 AM     HISTORY: Post procedure pleural effusion.    COMPARISON: 8/16/2017.    FINDINGS: Three left chest tubes in place. No pneumothorax identified.  Elevated left hemidiaphragm with minimal left basilar opacity that  likely represents small amount of atelectasis. Right lung expanded and  clear. Fusion hardware lower cervical spine. Right base atelectasis  has resolved since yesterday. Otherwise no change.      Impression    IMPRESSION: Three left chest tube in place with no evidence for  pneumothorax.    RAUL MARTIN MD   XR Chest Port 1 View    Narrative    CHEST ONE VIEW PORTABLE August 18, 2017 5:20 AM     HISTORY: Post procedure pleural effusion.    COMPARISON: 8/17/2017.      Impression    IMPRESSION: Left chest tubes, no pneumothorax. Elevated left  diaphragm. Patchy infiltrate above the left base. Right lung is clear.  Normal heart size. Previous anterior cervical spinal fusion.    JUSTA MACDONALD MD   XR Chest Port 1 View    Narrative    PORTABLE CHEST ONE VIEW   8/19/2017 5:55 AM    HISTORY: Post procedure pleural  effusion.    COMPARISON: 8/18/2017.      Impression    IMPRESSION: Two of the previously seen three left-sided chest tubes  have been removed. There continues to be elevation of the left  diaphragm with mild atelectasis in the left lung base. No pneumothorax  is seen. No definite pleural effusion is currently demonstrated. No  other abnormality or change is seen.     HARISH LUNDBERG MD

## 2017-08-20 NOTE — PLAN OF CARE
Problem: Goal Outcome Summary  Goal: Goal Outcome Summary  Physical Therapy Discharge Summary     Reason for therapy discharge:    Discharged to home with home therapy.     Progress towards therapy goal(s). See goals on Care Plan in [facility] electronic health record for goal details.  Goals partially met.  Barriers to achieving goals:   discharge from facility.     Therapy recommendation(s):    Continued therapy is recommended.  Rationale/Recommendations:  Pt will benefit from continued skilled PT services to increase functional activity tolerance, strength, indepednence and safety with functional mobility..

## 2017-08-21 LAB
BACTERIA SPEC CULT: NORMAL
Lab: NORMAL
SPECIMEN SOURCE: NORMAL

## 2017-09-11 LAB
FUNGUS SPEC CULT: NORMAL
SPECIMEN SOURCE: NORMAL

## 2017-10-12 LAB
MYCOBACTERIUM SPEC CULT: NORMAL
MYCOBACTERIUM SPEC CULT: NORMAL
SPECIMEN SOURCE: NORMAL

## 2019-08-07 ENCOUNTER — TELEPHONE (OUTPATIENT)
Dept: PHARMACY | Facility: CLINIC | Age: 64
End: 2019-08-07

## 2019-08-07 NOTE — TELEPHONE ENCOUNTER
Patient requesting to speak with Lucas Kovacs.  Wants to discuss getting a consult.  Please call soon.  OK to LM on VM.

## 2019-08-07 NOTE — TELEPHONE ENCOUNTER
Called pt - she is requesting an appointment with Lucas. Transferred to [place] scheduling line.   Clover Montelongo PharmD, FIDELIA, BCACP  [place] Pharmacist, [facility]

## 2020-01-20 ENCOUNTER — HOSPITAL ENCOUNTER (OUTPATIENT)
Facility: CLINIC | Age: 65
Setting detail: OBSERVATION
Discharge: HOME | End: 2020-01-21
Attending: HOSPITALIST | Admitting: HOSPITALIST
Payer: COMMERCIAL

## 2020-01-20 ENCOUNTER — TRANSFERRED RECORDS (OUTPATIENT)
Dept: HEALTH INFORMATION MANAGEMENT | Facility: CLINIC | Age: 65
End: 2020-01-20

## 2020-01-20 DIAGNOSIS — J44.1 COPD EXACERBATION (H): Primary | ICD-10-CM

## 2020-01-20 DIAGNOSIS — L40.50 PSORIATIC ARTHRITIS (H): ICD-10-CM

## 2020-01-20 LAB
CREAT SERPL-MCNC: 1.01 MG/DL (ref 0.51–0.95)
GFR SERPL CREATININE-BSD FRML MDRD: 59 ML/MIN/1.73ME2 (ref 60–150)
GLUCOSE SERPL-MCNC: 111 MG/DL (ref 74–100)
POTASSIUM SERPL-SCNC: 3.1 MMOL/L (ref 3.5–5.1)

## 2020-01-20 PROCEDURE — 96374 THER/PROPH/DIAG INJ IV PUSH: CPT

## 2020-01-20 PROCEDURE — 25000125 ZZHC RX 250: Performed by: PHYSICIAN ASSISTANT

## 2020-01-20 PROCEDURE — 25000132 ZZH RX MED GY IP 250 OP 250 PS 637: Performed by: PHYSICIAN ASSISTANT

## 2020-01-20 PROCEDURE — G0378 HOSPITAL OBSERVATION PER HR: HCPCS

## 2020-01-20 PROCEDURE — 25000128 H RX IP 250 OP 636: Performed by: PHYSICIAN ASSISTANT

## 2020-01-20 PROCEDURE — 99220 ZZC INITIAL OBSERVATION CARE,LEVL III: CPT | Performed by: PHYSICIAN ASSISTANT

## 2020-01-20 RX ORDER — POTASSIUM CL/LIDO/0.9 % NACL 10MEQ/0.1L
10 INTRAVENOUS SOLUTION, PIGGYBACK (ML) INTRAVENOUS
Status: DISCONTINUED | OUTPATIENT
Start: 2020-01-20 | End: 2020-01-21 | Stop reason: HOSPADM

## 2020-01-20 RX ORDER — ACETAMINOPHEN 650 MG/1
650 SUPPOSITORY RECTAL EVERY 4 HOURS PRN
Status: DISCONTINUED | OUTPATIENT
Start: 2020-01-20 | End: 2020-01-21 | Stop reason: HOSPADM

## 2020-01-20 RX ORDER — OXYCODONE HYDROCHLORIDE 5 MG/1
15-30 TABLET ORAL
Status: DISCONTINUED | OUTPATIENT
Start: 2020-01-20 | End: 2020-01-21 | Stop reason: HOSPADM

## 2020-01-20 RX ORDER — METHYLPREDNISOLONE SODIUM SUCCINATE 125 MG/2ML
60 INJECTION, POWDER, LYOPHILIZED, FOR SOLUTION INTRAMUSCULAR; INTRAVENOUS EVERY 12 HOURS
Status: DISCONTINUED | OUTPATIENT
Start: 2020-01-21 | End: 2020-01-20

## 2020-01-20 RX ORDER — TRIAMTERENE/HYDROCHLOROTHIAZID 37.5-25 MG
1 TABLET ORAL DAILY
COMMUNITY

## 2020-01-20 RX ORDER — POTASSIUM CHLORIDE 1500 MG/1
20-40 TABLET, EXTENDED RELEASE ORAL
Status: DISCONTINUED | OUTPATIENT
Start: 2020-01-20 | End: 2020-01-21 | Stop reason: HOSPADM

## 2020-01-20 RX ORDER — BUTALBITAL, ASPIRIN, AND CAFFEINE 325; 50; 40 MG/1; MG/1; MG/1
1 CAPSULE ORAL EVERY 6 HOURS PRN
COMMUNITY

## 2020-01-20 RX ORDER — ONDANSETRON 2 MG/ML
4 INJECTION INTRAMUSCULAR; INTRAVENOUS EVERY 6 HOURS PRN
Status: DISCONTINUED | OUTPATIENT
Start: 2020-01-20 | End: 2020-01-21 | Stop reason: HOSPADM

## 2020-01-20 RX ORDER — ONDANSETRON 4 MG/1
4 TABLET, ORALLY DISINTEGRATING ORAL EVERY 6 HOURS PRN
Status: DISCONTINUED | OUTPATIENT
Start: 2020-01-20 | End: 2020-01-21 | Stop reason: HOSPADM

## 2020-01-20 RX ORDER — PROCHLORPERAZINE 25 MG
25 SUPPOSITORY, RECTAL RECTAL EVERY 12 HOURS PRN
Status: DISCONTINUED | OUTPATIENT
Start: 2020-01-20 | End: 2020-01-21 | Stop reason: HOSPADM

## 2020-01-20 RX ORDER — ACETAMINOPHEN 325 MG/1
650 TABLET ORAL EVERY 4 HOURS PRN
Status: DISCONTINUED | OUTPATIENT
Start: 2020-01-20 | End: 2020-01-21 | Stop reason: HOSPADM

## 2020-01-20 RX ORDER — OXYCODONE HCL 40 MG/1
80 TABLET, FILM COATED, EXTENDED RELEASE ORAL 4 TIMES DAILY
Status: DISCONTINUED | OUTPATIENT
Start: 2020-01-20 | End: 2020-01-21 | Stop reason: HOSPADM

## 2020-01-20 RX ORDER — POTASSIUM CHLORIDE 29.8 MG/ML
20 INJECTION INTRAVENOUS
Status: DISCONTINUED | OUTPATIENT
Start: 2020-01-20 | End: 2020-01-21 | Stop reason: HOSPADM

## 2020-01-20 RX ORDER — METHYLPHENIDATE HYDROCHLORIDE 10 MG/1
20 TABLET ORAL 4 TIMES DAILY
Status: DISCONTINUED | OUTPATIENT
Start: 2020-01-20 | End: 2020-01-21 | Stop reason: HOSPADM

## 2020-01-20 RX ORDER — AMOXICILLIN 250 MG
2 CAPSULE ORAL 2 TIMES DAILY PRN
Status: DISCONTINUED | OUTPATIENT
Start: 2020-01-20 | End: 2020-01-21 | Stop reason: HOSPADM

## 2020-01-20 RX ORDER — AMOXICILLIN 250 MG
1 CAPSULE ORAL 2 TIMES DAILY PRN
Status: DISCONTINUED | OUTPATIENT
Start: 2020-01-20 | End: 2020-01-21 | Stop reason: HOSPADM

## 2020-01-20 RX ORDER — DIAZEPAM 5 MG
5 TABLET ORAL 3 TIMES DAILY PRN
Status: DISCONTINUED | OUTPATIENT
Start: 2020-01-20 | End: 2020-01-21 | Stop reason: HOSPADM

## 2020-01-20 RX ORDER — POLYETHYLENE GLYCOL 3350 17 G/17G
17 POWDER, FOR SOLUTION ORAL DAILY PRN
Status: DISCONTINUED | OUTPATIENT
Start: 2020-01-20 | End: 2020-01-21 | Stop reason: HOSPADM

## 2020-01-20 RX ORDER — MAGNESIUM SULFATE HEPTAHYDRATE 40 MG/ML
4 INJECTION, SOLUTION INTRAVENOUS EVERY 4 HOURS PRN
Status: DISCONTINUED | OUTPATIENT
Start: 2020-01-20 | End: 2020-01-21 | Stop reason: HOSPADM

## 2020-01-20 RX ORDER — ZOLPIDEM TARTRATE 6.25 MG/1
12.5 TABLET, FILM COATED, EXTENDED RELEASE ORAL
Status: DISCONTINUED | OUTPATIENT
Start: 2020-01-20 | End: 2020-01-21 | Stop reason: HOSPADM

## 2020-01-20 RX ORDER — BISACODYL 10 MG
10 SUPPOSITORY, RECTAL RECTAL DAILY PRN
Status: DISCONTINUED | OUTPATIENT
Start: 2020-01-20 | End: 2020-01-21 | Stop reason: HOSPADM

## 2020-01-20 RX ORDER — METHYLPREDNISOLONE SODIUM SUCCINATE 125 MG/2ML
60 INJECTION, POWDER, LYOPHILIZED, FOR SOLUTION INTRAMUSCULAR; INTRAVENOUS EVERY 12 HOURS
Status: DISCONTINUED | OUTPATIENT
Start: 2020-01-20 | End: 2020-01-21 | Stop reason: HOSPADM

## 2020-01-20 RX ORDER — GABAPENTIN 400 MG/1
400-800 CAPSULE ORAL 3 TIMES DAILY PRN
Status: DISCONTINUED | OUTPATIENT
Start: 2020-01-20 | End: 2020-01-21 | Stop reason: HOSPADM

## 2020-01-20 RX ORDER — POTASSIUM CHLORIDE 7.45 MG/ML
10 INJECTION INTRAVENOUS
Status: DISCONTINUED | OUTPATIENT
Start: 2020-01-20 | End: 2020-01-21 | Stop reason: HOSPADM

## 2020-01-20 RX ORDER — IBUPROFEN 600 MG/1
600 TABLET, FILM COATED ORAL EVERY 6 HOURS PRN
Status: DISCONTINUED | OUTPATIENT
Start: 2020-01-20 | End: 2020-01-21 | Stop reason: HOSPADM

## 2020-01-20 RX ORDER — TRIAMTERENE/HYDROCHLOROTHIAZID 37.5-25 MG
1 TABLET ORAL DAILY
Status: DISCONTINUED | OUTPATIENT
Start: 2020-01-21 | End: 2020-01-21 | Stop reason: HOSPADM

## 2020-01-20 RX ORDER — CODEINE/BUTALBITAL/ASA/CAFFEIN 30-50-325
1 CAPSULE ORAL EVERY 6 HOURS PRN
Status: ON HOLD | COMMUNITY
End: 2020-01-20

## 2020-01-20 RX ORDER — PROCHLORPERAZINE MALEATE 10 MG
10 TABLET ORAL EVERY 6 HOURS PRN
Status: DISCONTINUED | OUTPATIENT
Start: 2020-01-20 | End: 2020-01-21 | Stop reason: HOSPADM

## 2020-01-20 RX ORDER — POTASSIUM CHLORIDE 1.5 G/1.58G
20-40 POWDER, FOR SOLUTION ORAL
Status: DISCONTINUED | OUTPATIENT
Start: 2020-01-20 | End: 2020-01-21 | Stop reason: HOSPADM

## 2020-01-20 RX ORDER — BUTALBITAL, ASPIRIN AND CAFFEINE 50; 325; 40 MG/1; MG/1; MG/1
1 TABLET ORAL EVERY 6 HOURS PRN
Status: DISCONTINUED | OUTPATIENT
Start: 2020-01-20 | End: 2020-01-20

## 2020-01-20 RX ORDER — ALBUTEROL SULFATE 0.83 MG/ML
3 SOLUTION RESPIRATORY (INHALATION)
Status: DISCONTINUED | OUTPATIENT
Start: 2020-01-20 | End: 2020-01-21 | Stop reason: HOSPADM

## 2020-01-20 RX ORDER — IPRATROPIUM BROMIDE AND ALBUTEROL SULFATE 2.5; .5 MG/3ML; MG/3ML
3 SOLUTION RESPIRATORY (INHALATION)
Status: DISCONTINUED | OUTPATIENT
Start: 2020-01-20 | End: 2020-01-21 | Stop reason: HOSPADM

## 2020-01-20 RX ORDER — HYDROXYZINE HYDROCHLORIDE 25 MG/1
50 TABLET, FILM COATED ORAL 3 TIMES DAILY PRN
Status: DISCONTINUED | OUTPATIENT
Start: 2020-01-20 | End: 2020-01-21 | Stop reason: HOSPADM

## 2020-01-20 RX ORDER — NALOXONE HYDROCHLORIDE 0.4 MG/ML
.1-.4 INJECTION, SOLUTION INTRAMUSCULAR; INTRAVENOUS; SUBCUTANEOUS
Status: DISCONTINUED | OUTPATIENT
Start: 2020-01-20 | End: 2020-01-21 | Stop reason: HOSPADM

## 2020-01-20 RX ORDER — BUTALBITAL/ASPIRIN/CAFFEINE 50-325-40
1 CAPSULE ORAL EVERY 6 HOURS PRN
Status: DISCONTINUED | OUTPATIENT
Start: 2020-01-20 | End: 2020-01-21

## 2020-01-20 RX ORDER — DOXYCYCLINE 100 MG/1
200 CAPSULE ORAL DAILY
Status: DISCONTINUED | OUTPATIENT
Start: 2020-01-21 | End: 2020-01-21 | Stop reason: HOSPADM

## 2020-01-20 RX ORDER — BUPROPION HYDROCHLORIDE 150 MG/1
300 TABLET ORAL DAILY
Status: DISCONTINUED | OUTPATIENT
Start: 2020-01-21 | End: 2020-01-21 | Stop reason: HOSPADM

## 2020-01-20 RX ORDER — TIZANIDINE 2 MG/1
2 TABLET ORAL EVERY 8 HOURS PRN
Status: DISCONTINUED | OUTPATIENT
Start: 2020-01-20 | End: 2020-01-21 | Stop reason: HOSPADM

## 2020-01-20 RX ADMIN — OXYCODONE HYDROCHLORIDE 15 MG: 5 TABLET ORAL at 22:56

## 2020-01-20 RX ADMIN — OXYCODONE HYDROCHLORIDE 80 MG: 40 TABLET, FILM COATED, EXTENDED RELEASE ORAL at 23:28

## 2020-01-20 RX ADMIN — IPRATROPIUM BROMIDE AND ALBUTEROL SULFATE 3 ML: .5; 3 SOLUTION RESPIRATORY (INHALATION) at 23:02

## 2020-01-20 RX ADMIN — METHYLPHENIDATE HYDROCHLORIDE 20 MG: 10 TABLET ORAL at 23:29

## 2020-01-20 RX ADMIN — METHYLPREDNISOLONE SODIUM SUCCINATE 62.5 MG: 125 INJECTION, POWDER, FOR SOLUTION INTRAMUSCULAR; INTRAVENOUS at 22:59

## 2020-01-20 RX ADMIN — HYDROXYZINE HYDROCHLORIDE 50 MG: 25 TABLET, FILM COATED ORAL at 23:28

## 2020-01-21 VITALS
TEMPERATURE: 96.1 F | WEIGHT: 197.87 LBS | BODY MASS INDEX: 32.93 KG/M2 | RESPIRATION RATE: 18 BRPM | OXYGEN SATURATION: 94 % | SYSTOLIC BLOOD PRESSURE: 130 MMHG | DIASTOLIC BLOOD PRESSURE: 65 MMHG

## 2020-01-21 LAB
ANION GAP SERPL CALCULATED.3IONS-SCNC: 5 MMOL/L (ref 3–14)
BASOPHILS # BLD AUTO: 0 10E9/L (ref 0–0.2)
BASOPHILS NFR BLD AUTO: 0 %
BUN SERPL-MCNC: 14 MG/DL (ref 7–30)
CALCIUM SERPL-MCNC: 8.5 MG/DL (ref 8.5–10.1)
CHLORIDE SERPL-SCNC: 111 MMOL/L (ref 94–109)
CO2 SERPL-SCNC: 24 MMOL/L (ref 20–32)
CREAT SERPL-MCNC: 0.72 MG/DL (ref 0.52–1.04)
DIFFERENTIAL METHOD BLD: ABNORMAL
EOSINOPHIL # BLD AUTO: 0 10E9/L (ref 0–0.7)
EOSINOPHIL NFR BLD AUTO: 0 %
ERYTHROCYTE [DISTWIDTH] IN BLOOD BY AUTOMATED COUNT: 15 % (ref 10–15)
GFR SERPL CREATININE-BSD FRML MDRD: 89 ML/MIN/{1.73_M2}
GLUCOSE SERPL-MCNC: 145 MG/DL (ref 70–99)
HCT VFR BLD AUTO: 33.2 % (ref 35–47)
HGB BLD-MCNC: 10.5 G/DL (ref 11.7–15.7)
IMM GRANULOCYTES # BLD: 0 10E9/L (ref 0–0.4)
IMM GRANULOCYTES NFR BLD: 0 %
LACTATE BLD-SCNC: 1.5 MMOL/L (ref 0.7–2)
LYMPHOCYTES # BLD AUTO: 0.4 10E9/L (ref 0.8–5.3)
LYMPHOCYTES NFR BLD AUTO: 10.3 %
MAGNESIUM SERPL-MCNC: 1.9 MG/DL (ref 1.6–2.3)
MCH RBC QN AUTO: 29 PG (ref 26.5–33)
MCHC RBC AUTO-ENTMCNC: 31.6 G/DL (ref 31.5–36.5)
MCV RBC AUTO: 92 FL (ref 78–100)
MONOCYTES # BLD AUTO: 0.3 10E9/L (ref 0–1.3)
MONOCYTES NFR BLD AUTO: 7.4 %
NEUTROPHILS # BLD AUTO: 3.3 10E9/L (ref 1.6–8.3)
NEUTROPHILS NFR BLD AUTO: 82.3 %
NRBC # BLD AUTO: 0 10*3/UL
NRBC BLD AUTO-RTO: 0 /100
PLATELET # BLD AUTO: 204 10E9/L (ref 150–450)
POTASSIUM SERPL-SCNC: 3.4 MMOL/L (ref 3.4–5.3)
POTASSIUM SERPL-SCNC: 3.4 MMOL/L (ref 3.4–5.3)
RBC # BLD AUTO: 3.62 10E12/L (ref 3.8–5.2)
SODIUM SERPL-SCNC: 140 MMOL/L (ref 133–144)
WBC # BLD AUTO: 4.1 10E9/L (ref 4–11)

## 2020-01-21 PROCEDURE — 84132 ASSAY OF SERUM POTASSIUM: CPT | Performed by: PHYSICIAN ASSISTANT

## 2020-01-21 PROCEDURE — 85025 COMPLETE CBC W/AUTO DIFF WBC: CPT | Performed by: PHYSICIAN ASSISTANT

## 2020-01-21 PROCEDURE — 36415 COLL VENOUS BLD VENIPUNCTURE: CPT | Performed by: PHYSICIAN ASSISTANT

## 2020-01-21 PROCEDURE — 25000132 ZZH RX MED GY IP 250 OP 250 PS 637: Performed by: PHYSICIAN ASSISTANT

## 2020-01-21 PROCEDURE — 25000128 H RX IP 250 OP 636: Performed by: PHYSICIAN ASSISTANT

## 2020-01-21 PROCEDURE — G0378 HOSPITAL OBSERVATION PER HR: HCPCS

## 2020-01-21 PROCEDURE — 80048 BASIC METABOLIC PNL TOTAL CA: CPT | Performed by: PHYSICIAN ASSISTANT

## 2020-01-21 PROCEDURE — 99217 ZZC OBSERVATION CARE DISCHARGE: CPT | Performed by: PHYSICIAN ASSISTANT

## 2020-01-21 PROCEDURE — 25000125 ZZHC RX 250: Performed by: PHYSICIAN ASSISTANT

## 2020-01-21 PROCEDURE — 83735 ASSAY OF MAGNESIUM: CPT | Performed by: PHYSICIAN ASSISTANT

## 2020-01-21 PROCEDURE — 96376 TX/PRO/DX INJ SAME DRUG ADON: CPT

## 2020-01-21 PROCEDURE — 83605 ASSAY OF LACTIC ACID: CPT | Performed by: PHYSICIAN ASSISTANT

## 2020-01-21 RX ORDER — DOXYCYCLINE 100 MG/1
200 CAPSULE ORAL DAILY
Qty: 4 CAPSULE | Refills: 0 | Status: SHIPPED | OUTPATIENT
Start: 2020-01-22

## 2020-01-21 RX ORDER — IPRATROPIUM BROMIDE AND ALBUTEROL SULFATE 2.5; .5 MG/3ML; MG/3ML
3 SOLUTION RESPIRATORY (INHALATION)
Qty: 1 BOX | Refills: 0 | Status: SHIPPED | OUTPATIENT
Start: 2020-01-21

## 2020-01-21 RX ORDER — ALBUTEROL SULFATE 90 UG/1
2 AEROSOL, METERED RESPIRATORY (INHALATION) EVERY 4 HOURS PRN
Qty: 1 INHALER | Refills: 0 | Status: SHIPPED | OUTPATIENT
Start: 2020-01-21

## 2020-01-21 RX ORDER — BUTALBITAL, ACETAMINOPHEN AND CAFFEINE 50; 325; 40 MG/1; MG/1; MG/1
1 TABLET ORAL EVERY 4 HOURS PRN
Status: DISCONTINUED | OUTPATIENT
Start: 2020-01-21 | End: 2020-01-21 | Stop reason: HOSPADM

## 2020-01-21 RX ORDER — METHYLPREDNISOLONE 4 MG
TABLET, DOSE PACK ORAL
Qty: 21 TABLET | Refills: 0 | Status: SHIPPED | OUTPATIENT
Start: 2020-01-21

## 2020-01-21 RX ADMIN — OXYCODONE HYDROCHLORIDE 30 MG: 5 TABLET ORAL at 04:20

## 2020-01-21 RX ADMIN — METHYLPHENIDATE HYDROCHLORIDE 20 MG: 10 TABLET ORAL at 07:56

## 2020-01-21 RX ADMIN — OMEPRAZOLE 40 MG: 20 CAPSULE, DELAYED RELEASE ORAL at 07:56

## 2020-01-21 RX ADMIN — OXYCODONE HYDROCHLORIDE 80 MG: 40 TABLET, FILM COATED, EXTENDED RELEASE ORAL at 07:56

## 2020-01-21 RX ADMIN — ALBUTEROL SULFATE 2.5 MG: 2.5 SOLUTION RESPIRATORY (INHALATION) at 04:21

## 2020-01-21 RX ADMIN — METHYLPREDNISOLONE SODIUM SUCCINATE 62.5 MG: 125 INJECTION, POWDER, FOR SOLUTION INTRAMUSCULAR; INTRAVENOUS at 09:24

## 2020-01-21 RX ADMIN — IPRATROPIUM BROMIDE AND ALBUTEROL SULFATE 3 ML: .5; 3 SOLUTION RESPIRATORY (INHALATION) at 08:00

## 2020-01-21 RX ADMIN — DOXYCYCLINE 200 MG: 100 CAPSULE ORAL at 07:56

## 2020-01-21 RX ADMIN — ZOLPIDEM TARTRATE 12.5 MG: 6.25 TABLET, FILM COATED, EXTENDED RELEASE ORAL at 00:43

## 2020-01-21 RX ADMIN — SERTRALINE HYDROCHLORIDE 150 MG: 50 TABLET ORAL at 07:56

## 2020-01-21 RX ADMIN — TRIAMTERENE AND HYDROCHLOROTHIAZIDE 1 TABLET: 37.5; 25 TABLET ORAL at 07:57

## 2020-01-21 RX ADMIN — OXYCODONE HYDROCHLORIDE 30 MG: 5 TABLET ORAL at 10:21

## 2020-01-21 RX ADMIN — BUTALBITAL, ACETAMINOPHEN, AND CAFFEINE 1 TABLET: 50; 325; 40 TABLET ORAL at 10:21

## 2020-01-21 RX ADMIN — BUPROPION HYDROCHLORIDE 300 MG: 150 TABLET, FILM COATED, EXTENDED RELEASE ORAL at 07:57

## 2020-01-21 NOTE — CONSULTS
Patient discharging home today with family. Home Nebulizer Machine has been ordered with [facility]. Approximate delivery time is 11:30 AM to patient's hospital room.  PCP follow-up appointment has also been made:         Follow up with primary care provider, Tej Loyola, within 7 days for hospital follow- up.   Appointment has been scheduled for you:   [facility]   Monday, January 27, 2020   11:00 AM

## 2020-01-21 NOTE — PLAN OF CARE
Observation goals PRIOR TO DISCHARGE     Comments:    -1. Ween oxygen-Not met, still needing oxygen to keep at above 90     -2. Transition to oral steroid regimen -Not met    Nurse to notify provider when observation goals have been met and patient is ready for discharge.

## 2020-01-21 NOTE — PLAN OF CARE
Pt doing well. A/o. VSS. Pain controlled. Tolerating reg diet. Up SBA with walker. Discharge paper work reviewed with pt. Verbalizes understanding. PIV removed. Follow up aware. Neb machine received. Home with son.

## 2020-01-21 NOTE — PLAN OF CARE
Pt is A&O x4, anxious at times. VSS on 1L of O2. Pt has chronic back pain. Scheduled oxycontine. C/o headache d/t/ cough. LS diminished, congested, and wheezes expiratory. Infrequent cough, congested, non-productive. Scheduled nebs. Pt unable to follow education about use of IS, needs follow up. Tolerating regular diet, ambulates SBA. Bed alarm in place. Will continue to monitor.

## 2020-01-21 NOTE — PLAN OF CARE
Observation goals PRIOR TO DISCHARGE     Comments:    -1. Ween oxygen: met. 95 on RA     -2. Transition to oral steroid regimen -Not met    Nurse to notify provider when observation goals have been met and patient is ready for discharge.

## 2020-01-21 NOTE — DISCHARGE SUMMARY
"[facility]  Hospitalist Discharge Summary       Date of Admission:  1/20/2020  Date of Discharge:  1/21/2020 11:37 AM  Discharging Provider: JoAnna K. Barthell, PA-C      Discharge Diagnoses   Acute hypoxic respiratory failure in the setting untreated suspected COPD with acute exacerbation.  Nicotine dependence - ecigs.  Hypokalemia.  Elevated lactic acid.  Chronic pain syndrome with hx of neck and back fusions with associated neuropathy and cervicogenic headaches.  HTN.  Psoriatic arthritis.  Depression.  ADHD.  Insomnia.  GERD.  Follow-ups Needed After Discharge   Follow-up Appointments     Follow-up and recommended labs and tests       Please call to schedule follow up with Dr. Loyola to occur within 1 week.   Inform this is a \"hospital follow up visit\" to help get in during   recommended time. OK to see a colleague of primary if needed.  - Recommend PFTs         Follow-up and recommended labs and tests       Follow up with primary care provider, Tej Loyola, within 7 days   for hospital follow- up.   Appointment has been scheduled for you:  [facility]  Monday, January 27, 2020  11:00 AM           Discharge Disposition   Discharged to home  Condition at discharge: Stable    Hospital Course   Carlie Myers is a 64 year old female with psoriatic arthritis, hx prior parapneumonic effusion s/p left VATS (2017), and former smoker who transferred from [address] after presenting with cough, wheeze, sob found to have hypoxia.    Acute hypoxic respiratory failure in the setting of COPD exacerbation   Nicotine dependence: Presenting with three days of productive cough and wheeze with worsening SOB in the past 24 hours. SpO2 87% on room air at outside facility. CXR with RLL atelectasis vs scarring, no obvious pneumonia. WBC 5.0. Rapid influenza negative. Lactic acid 2.5. Received duoneb X3, IV Solumedrol 60 mg X1, Doxycycline 100 mg IV X1 in the ED. Former smoker with " est 35-40 pack year hx, now using e cigs.   -- Was able to quickly wean off O2. Sats >90 at rest and with exertion on room air on day of discharge.  -- Treated with IV Solumedrol 60 mg BID and transitioned to Medrol dose pack at discharge.  -- Doxycycline 200 mg po every day x 5 days for acute COPD exacerbation, initiated 1/20.   -- Duonebs q4 hrs while awake, PRN albuterol nebs. Home with neb machine, duonebs, and alb inhaler.  -- Discussed importance of close PCP follow-up at discharge with outpatient PFTs (pt has not had these). Likely need maintenance meds for probable COPD.  -- E cig cessation encouraged.      Hypokalemia, mild. Replaced.      Lactic acidosis: Lactic acid 2.5 and normalized following 500 ml fluid bolus. Attribute tachycardia (nebs) and hypoxia.     Chronic pain syndrome   Hx of neck and back fusions with associated neuropathy and cervicogenic headaches: Dr. Tej Loyola prescribes PTA pain regimen. Confirmed through Care Everywhere and [facility].   [Oxycontin 80 mg QID, oxycodone 15-30 mg five times daily PRN, Valium 5 mg TID PRN, Gabapentin 400-800 mg TID PRN, Zanaflex 2 mg TID PRN, hydroxyzine 50 mg TID PRN, aspirin-caffeine-butalbital 325-40-50 QID PRN headaches]  -- Continue above regimen.     HTN: Continue PTA Triamterine hydrochlorothiazide 37.5-25 mg po every day     Depression  ADHD  Insomnia:   [Ritalin 20 mg QID, Zoloft 150 mg po every day, Ambien 12.5 mg at bedtime, Wellbutrin  mg po qd]  -- Continue above regimen.  -- Demonstrates difficulty focusing on relevant information and frequently returns conversation to prior hospitalizations. Plan discussed with son Peterson who was present during interaction and voices he will facilitate.    GERD: Continue Omeprazole 40 mg po every day.     Psoriatic arthritis: Follows with Dr. Mckeon of Rheumatology  -hold Enbrel until recovered from COPD exacerbation above.    Consultations This Hospital Stay   CARE COORDINATOR IP  "CONSULT  RESPIRATORY CARE IP CONSULT  CARE TRANSITION RN/SW IP CONSULT  CARE TRANSITION RN/SW IP CONSULT    Code Status   Full Code    Time Spent on this Encounter   I, JoAnna K. Barthell, PA-C, personally saw the patient today and spent greater than 30 minutes discharging this patient.       This patient was discussed with Dr. Lock of the Hospitalist Service who agrees with current plans as outlined above.    JoAnna K. Barthell, PA-C  [facility]  ______________________________________________________________________  Physical Exam   Temp: 96.1  F (35.6  C) Temp src: Oral BP: 130/65   Heart Rate: 88 Resp: 18 SpO2: 94 % O2 Device: None (Room air) Oxygen Delivery: 1 LPM  Vitals:    01/21/20 0614   Weight: 89.8 kg (197 lb 13.9 oz)   Constitutional: Appears older than stated age, no acute distress. Demonstrates difficulty concentrating.  Respiratory: Breath sounds rhoncherous throughout. No increased work of breathing on room air.  Cardiovascular: RRR, no rub or murmur appreciated. No peripheral edema.  GI: Soft, non-tender, non-distended.  Skin: Warm, dry, no rashes or lesions.       Primary Care Physician   Tej Loyola    Discharge Orders      Reason for your hospital stay    Further management of suspected COPD exacerbation.     Follow-up and recommended labs and tests     Please call to schedule follow up with Dr. Loyola to occur within 1 week. Inform this is a \"hospital follow up visit\" to help get in during recommended time. OK to see a colleague of primary if needed.  - Recommend PFTs     Activity    Your activity upon discharge: activity as tolerated     Discharge Instructions    You received first dose of doxycycline antibiotic in hospital on 1/21, start your prescription on Wednesday, 1/22.    Use nebulizer treatments every 4 hours for the next 3-5 days. Expect that you will start feeling better after 3-5 days and would then use each morning and before bed. Discuss with primary " provider in follow up.    Albuterol inhaler is for as needed use for increased shortness of breath and wheezing. Use spacer when using medication. And if you need more than three times in an hour, recommend further evaluation in clinic or emergency department.    Do NOT take Enbrel until instructed to resume under advisement from primary care provider.     Follow-up and recommended labs and tests     Follow up with primary care provider, Tej Loyola, within 7 days for hospital follow- up.   Appointment has been scheduled for you:  [facility]  Monday, January 27, 2020  11:00 AM     Full Code     Diet    Follow this diet upon discharge:    Regular Diet Adult       Significant Results and Procedures   Most Recent 3 CBC's:  Recent Labs   Lab Test 01/21/20  0607 08/18/17  0720 08/17/17  0718   WBC 4.1 5.1 3.8*   HGB 10.5* 9.8* 9.6*   MCV 92 92 93    254 203     Most Recent 3 BMP's:  Recent Labs   Lab Test 01/21/20  0607 01/21/20  0003 08/19/17  0757  08/17/17  0718 08/15/17  0720     --   --   --  139 136   POTASSIUM 3.4 3.4 3.2*   < > 3.2* 3.7   CHLORIDE 111*  --   --   --  101 100   CO2 24  --   --   --  32 30   BUN 14  --   --   --  7 7   CR 0.72  --   --   --  0.58 0.51*   ANIONGAP 5  --   --   --  6 6   BENNY 8.5  --   --   --  8.4* 8.3*   *  --   --   --  90 87    < > = values in this interval not displayed.     Discharge Medications   Discharge Medication List as of 1/21/2020 10:37 AM      START taking these medications    Details   albuterol (PROAIR HFA/PROVENTIL HFA/VENTOLIN HFA) 108 (90 Base) MCG/ACT inhaler Inhale 2 puffs into the lungs every 4 hours as needed for shortness of breath / dyspnea or wheezing, Disp-1 Inhaler, R-0, Local PrintPharmacy may dispense brand covered by insurance (Proair, or proventil or ventolin or generic albuterol inhaler)    Futu re refills by PCP Dr. Tej Loyola with phone number [phone].      doxycycline hyclate (VIBRAMYCIN) 100  MG capsule Take 2 capsules (200 mg) by mouth daily, Disp-4 capsule, R-0, E-PrescribeFuture refills by PCP Dr. Tej Loyola with phone number [phone].      ipratropium - albuterol 0.5 mg/2.5 mg/3 mL (DUONEB) 0.5-2.5 (3) MG/3ML neb solution Take 1 vial (3 mLs) by nebulization every 4 hours (while awake), Disp-1 Box, R-0, E-PrescribeFuture refills by PCP Dr. Tej Loyola with phone number [phone].      methylPREDNISolone (MEDROL) 4 MG tablet therapy pack Follow Package Directions, Disp-21 tablet, R-0, E-Prescribe      order for DME Equipment being ordered: Other: nebulizer machine.  Treatment Diagnosis: COPD exacerbation.Disp-1 Units, R-0, Local PrintFuture refills by PCP Dr. Tej Loyola with phone number [phone].         CONTINUE these medications which have CHANGED    Details   etanercept (ENBREL) 50 MG/ML injection Inject 0.98 mLs (50 mg) Subcutaneous once a week HOLD STARTING 1/21 UNTIL INSTRUCTED TO RESUME BY PRIMARY PROVIDER, Transitional         CONTINUE these medications which have NOT CHANGED    Details   amoxicillin (AMOXIL) 500 MG capsule Take 500 mg by mouth daily Take 4 tabs before and after dental appointments due to mitral valve prolapse , Historical      buPROPion (WELLBUTRIN XL) 300 MG 24 hr tablet Take 300 mg by mouth daily , Historical      butalbital-aspirin-caffeine (FIORINAL) -40 MG capsule Take 1 capsule by mouth every 6 hours as needed for headaches, Historical      clobetasol (TEMOVATE) 0.05 % cream Apply topically 2 times daily as needed (psoriasis) Historical      dextromethorphan-guaiFENesin (MUCINEX DM)  MG per 12 hr tablet Take 1 tablet by mouth 2 times daily, Disp-28 tablet, R-0, OTC      diazepam (VALIUM) 5 MG tablet Take 5 mg by mouth 3 times daily , Historical      gabapentin (NEURONTIN) 400 MG capsule Take 800 mg by mouth 3 times daily , Historical      hydrOXYzine (VISTARIL) 50 MG capsule Take 50 mg by mouth every 8 hours as needed  , Historical      ibuprofen (ADVIL/MOTRIN) 200 MG tablet Take 600 mg by mouth 4 times daily as needed , Historical      methylphenidate (RITALIN) 20 MG tablet Take 20 mg by mouth 4 times daily , Historical      multivitamin, therapeutic (THERA-VIT) TABS tablet Take 1 tablet by mouth daily, Historical      omeprazole (PRILOSEC) 40 MG DR capsule Take 40 mg by mouth every morning , Historical      oxyCODONE (OXYCONTIN) 40 MG 12 hr tablet Take 80 mg by mouth 4 times daily , Historical      oxyCODONE IR (ROXICODONE) 15 MG tablet Take 30 mg by mouth 5 times daily , Historical      sertraline (ZOLOFT) 50 MG tablet Take 150 mg by mouth daily , Historical      tiZANidine (ZANAFLEX) 2 MG capsule Take 6 mg by mouth daily , Historical      triamterene-HCTZ (MAXZIDE-25) 37.5-25 MG tablet Take 1 tablet by mouth daily, Historical      zolpidem ER (AMBIEN CR) 12.5 MG CR tablet Take 12.5 mg by mouth At Bedtime , Historical           Allergies   No Known Allergies

## 2020-01-21 NOTE — H&P
[facility]    History and Physical - Hospitalist Service       Date of Admission:  1/20/2020    Assessment & Plan   Carlie Myers is a 64 year old female with PMHx of nicotine dependence, psoriatic arthritis, HTN, chronic back and neck pain with associated neuropathy and cervicogenic headaches, depression, ADHD, insomnia, anxiety, and prior parapneumonic effusion s/p left-sided VATS (2017) transferred from [address] [place] [address] ED on 1/20/2020 for COPD exacerbation. Registered to the observation unit for further evaluation and treatment.     Acute hypoxic respiratory failure in the setting of COPD exacerbation   Nicotine dependence: Presenting with three days of productive cough and wheeze with worsening SOB in the past 24 hours. SpO2 87% on room air. CXR with RLL atelectasis vs scarring, no obvious pneumonia. WBC 5.0. Rapid influenza negative. Lactic acid 2.5. Received duoneb X3, IV Solumedrol 60 mg X1, Doxycycline 100 mg IV X1 in the ED. Currently uses e cigs.   -- [place] to observation   -- Continue IV Solumedrol 60 mg BID, transition to prednisone burst at discharge   -- Doxycycline 200 mg po every day X5 days for acute COPD exacerbation   -- Duonebs q4 hrs while awake, PRN albuterol nebs   -- RCAT consulted, encourage pulmonary toilet with incentive spirometer and acapella  -- Ween oxygen as tolerated, during my interview, saturating at 92% on room air   -- Discussed importance of close PCP follow-up at discharge with outpatient PFTs (pt has not had these)   -- E cig cessation encouraged     Hypokalemia: Potassium 3.1 on admission.   -- Recheck potassium on arrival, check magnesium  -- Electrolyte replacement protocol in place     Lactic acidosis: Lactic acid 2.5 in the ED. S/P 500 ml bolus.   -- Recheck lactic acid now     Chronic pain syndrome   Hx of neck and back fusions with associated neuropathy and cervicogenic headaches: Dr. Tej Loyola prescribes PTA pain regimen. Confirmed through  Care Everywhere and [facility].   [Oxycontin 80 mg QID, oxycodone 15-30 mg five times daily PRN, Valium 5 mg TID PRN, Gabapentin 400-800 mg TID PRN, Zanaflex 2 mg TID PRN, hydroxyzine 50 mg TID PRN, aspirin-caffeine-butalbital 325-40-50 QID PRN headaches]  -- Continue above regimen     HTN: Continue PTA Triamterine hydrochlorothiazide 37.5-25 mg po every day     Depression  ADHD  Insomnia:   [Ritalin 20 mg QID, Zoloft 150 mg po every day, Ambien 12.5 mg at bedtime, Wellbutrin  mg po qd]  -- Continue above regimen     GERD: Continue Omeprazole 40 mg po every day    Psoriatic arthritis: Follows with Dr. Mckeon of Rheumatology. Maintained on Enbrel.      Diet: Regular Diet Adult    DVT Prophylaxis: Ambulate every shift  Saravia Catheter: not present  Code Status: Full Code     Disposition Plan   Expected discharge: Tomorrow, recommended to prior living arrangement once oxygen weened, transitioned to oral steroids .  Entered: Salome Rodriguez PA-C 01/20/2020, 9:45 PM     The patient's care was discussed with the Attending Physician, Dr. Parker, Bedside Nurse and Patient.    Salome Rodriguez PA-C  [facility]  ______________________________________________________________________    Chief Complaint   Cough, SOB    History is obtained from the patient    History of Present Illness   Carlie Myers is a 64 year old female with PMHx of nicotine dependence, psoriatic arthritis, HTN, chronic back and neck pain with associated neuropathy and cervicogenic headaches, depression, ADHD, insomnia, anxiety, and prior parapneumonic effusion s/p left-sided VATS (2017) transferred from [address] [place] [address] ED on 1/20/2020 for COPD exacerbation. Registered to the observation unit for further evaluation and treatment.     Presenting with three days of productive cough and wheeze with worsening SOB in the past 24 hours.     Seen at [address] [place] [address] ED. SpO2 87% on room air. CXR with RLL  atelectasis vs scarring, no obvious pneumonia. WBC 5.0. Rapid influenza negative. Lactic acid 2.5. Potassium 3.1, remainder of BMP unremarkable. Received duoneb X3, IV Solumedrol 60 mg X1, Doxycycline 100 mg IV X1 in the ED, 500 ml bolus, Ativan 0.5 mg X1. Currently uses e cigs. Transferred to [facility] for ongoing hypoxia and need for observation.     On my interview, pt feels that breathing is improved since admission. Wheezing improved. No formal diagnosis of COPD or asthma as pt has never had PFTs per report. Denies chest pain, dizziness, lightheadedness. No N/V/diarrhea. Desires to go home as soon as possible, but recognizes rationale for overnight observation.     Review of Systems    The 10 point Review of Systems is negative other than noted in the HPI.    Past Medical History    1. HTN   2. GERD  3. Chronic pain syndrome (neck and back) with associated neuropathy and cervicogenic headaches  4. Depression   5. Insomnia   6. ADHD  7. Anxiety   8. Mitral valve prolapse  9. Psoriatic arthritis  10. Hx of parapneumonic effusion s/p left VATs and decortication (2017)    Past Surgical History   I have reviewed this patient's surgical history and updated it with pertinent information if needed.  Past Surgical History:   Procedure Laterality Date     CARDIAC SURGERY      mitral valve prolapse     HEAD & NECK SURGERY      neck and back fusion     ORTHOPEDIC SURGERY      knee     THORACOSCOPIC DECORTICATION LUNG Left 8/14/2017    Procedure: THORACOSCOPIC DECORTICATION LUNG;  removal if intrapleural fibrin debris, drainage of fluid parapneumonic effusion;  Surgeon: Du Ramesh MD;  Location:  OR     THORACOSCOPY Left 8/14/2017    Procedure: THORACOSCOPY;;  Surgeon: Du Ramesh MD;  Location:  OR     Social History   I have reviewed this patient's social history and updated it with pertinent information if needed.  Social History     Tobacco Use     Smoking status: Current Every Day Smoker      Packs/day: 1.00   Substance Use Topics     Alcohol use: No     Drug use: Not on file     Smokes e cigs. Lives at home, daughter is her PCA. Uses a cane in the house and walker outside.     Family History   Sister: Lung and brain cancer     Prior to Admission Medications   Prior to Admission Medications   Prescriptions Last Dose Informant Patient Reported? Taking?   DiazePAM (VALIUM PO)  Self Yes No   Sig: Take 5 mg by mouth 3 times daily   GABAPENTIN PO  Self Yes No   Sig: Take 800 mg by mouth 3 times daily   HYDROXYZINE PAMOATE PO  Self Yes No   Sig: Take 50 mg by mouth every 8 hours   IBUPROFEN PO  Self Yes No   Sig: Take 600 mg by mouth 4 times daily   Methylphenidate HCl (RITALIN PO)  Self Yes No   Sig: Take 20 mg by mouth 4 times daily   OMEPRAZOLE PO  Self Yes No   Sig: Take 40 mg by mouth every morning   OXYCODONE HCL PO   Yes No   Sig: Take 30 mg by mouth 5 times daily   OxyCODONE HCl (OXYCONTIN PO)  Self Yes No   Sig: Take 80 mg by mouth 4 times daily    Sertraline HCl (ZOLOFT PO)  Self Yes No   Sig: Take 150 mg by mouth daily   TiZANidine HCl (ZANAFLEX PO)  Self Yes No   Sig: Take 2 mg by mouth 2 times daily   Zolpidem Tartrate (AMBIEN CR PO)   Yes No   Sig: Take 12.5 mg by mouth At Bedtime   amoxicillin (AMOXIL) 500 MG capsule prn at prn Self Yes Yes   Sig: Take 500 mg by mouth daily Take 4 tabs before and after dental appointments due to mitral valve prolapse    buPROPion (WELLBUTRIN XL) 300 MG 24 hr tablet 1/20/2020 at am  Yes Yes   Sig: Take 300 mg by mouth daily    butalbital-aspirin-caffeine-codeine (FIORINAL WITH CODEINE) per capsule 1/20/2020 at am x1  Yes Yes   Sig: Take 1 capsule by mouth every 6 hours as needed for headaches   clobetasol (TEMOVATE) 0.05 % cream  Self Yes No   Sig: Apply topically 2 times daily as needed (psoriasis)    dextromethorphan-guaiFENesin (MUCINEX DM)  MG per 12 hr tablet   No No   Sig: Take 1 tablet by mouth 2 times daily   multivitamin, therapeutic (THERA-VIT)  TABS tablet  Self Yes No   Sig: Take 1 tablet by mouth daily      Facility-Administered Medications: None     Allergies   No Known Allergies    Physical Exam   Vital Signs: Temp: 95.9  F (35.5  C) Temp src: Oral BP: (!) 176/77     Resp: 18 SpO2: 96 %(after using restroom, and walking back to bed) O2 Device: None (Room air) Oxygen Delivery: 2 LPM  Weight: 0 lbs 0 oz    CONSTITUTIONAL: Pt laying in bed, dressed in hospital garb. Appears comfortable. Cooperative with interview.   HEENT: Normocephalic, atraumatic. Negative for conjunctival redness or scleral icterus.  Oral mucosa pink and moist; negative for ulcerations, erythema, or exudates.  Dentition in good repair.   CARDIOVASCULAR: RRR, no murmurs, rubs, or extra heart sounds appreciated. Pulses +2/4 and regular in upper and lower extremities, bilaterally.   RESPIRATORY: No increased work of breathing.  Supplemental oxygenation via NC at 2 LPM. Course rhonchi throughout all fields, no wheezes noted.   GASTROINTESTINAL:  Abdomen soft, non-distended. BS auscultated in all four quadrants. Negative for tenderness to palpation.  No masses or organomegaly noted.  MUSCULOSKELETAL: No gross deformities noted. Normal muscle tone.   HEMATOLOGIC/LYMPHATIC/IMMUNOLOGIC: Negative for lower extremity edema, bilaterally.  NEUROLOGIC: Alert and oriented to person, place, and time.  strength intact. No focal neuro deficits.   SKIN: Warm, dry, intact. No jaundice noted. Negative for suspicious lesions, rashes, bruising, open sores or abrasions.     Data   Data reviewed today: I reviewed all medications, new labs and imaging results over the last 24 hours. I personally reviewed no images or EKG's today. Reviewed all labs and imaging in paper chart.     No lab results found in last 7 days.    No results found for this or any previous visit (from the past 24 hour(s)).

## 2020-01-21 NOTE — PHARMACY-ADMISSION MEDICATION HISTORY
Pharmacy Medication History  Admission medication history interview status for the 1/20/2020  admission is complete. See EPIC admission navigator for prior to admission medications     Medication history sources: Patient  Medication history source reliability: Good  Adherence assessment: Good    Significant changes made to the medication list:  Added Enbrel and Maxzide-25      Additional medication history information:   None    Medication reconciliation completed by provider prior to medication history? Yes    Time spent in this activity: 15 minutes       Prior to Admission medications    Medication Sig Last Dose Taking? Auth Provider   amoxicillin (AMOXIL) 500 MG capsule Take 500 mg by mouth daily Take 4 tabs before and after dental appointments due to mitral valve prolapse  prn at prn Yes Reported, Patient   buPROPion (WELLBUTRIN XL) 300 MG 24 hr tablet Take 300 mg by mouth daily  1/20/2020 at am Yes Unknown, Entered By History   butalbital-aspirin-caffeine (FIORINAL) -40 MG capsule Take 1 capsule by mouth every 6 hours as needed for headaches 1/20/2020 at x1 Yes Unknown, Entered By History   clobetasol (TEMOVATE) 0.05 % cream Apply topically 2 times daily as needed (psoriasis)  prn at prn Yes Reported, Patient   dextromethorphan-guaiFENesin (MUCINEX DM)  MG per 12 hr tablet Take 1 tablet by mouth 2 times daily Past Month at Unknown time Yes Kaitlin Lai MD   diazepam (VALIUM) 5 MG tablet Take 5 mg by mouth 3 times daily  1/20/2020 at am x1 Yes Reported, Patient   etanercept (ENBREL) 50 MG/ML injection Inject 50 mg Subcutaneous once a week 1/16/2020 at am Yes Unknown, Entered By History   gabapentin (NEURONTIN) 400 MG capsule Take 800 mg by mouth 3 times daily  1/19/2020 at pm Yes Reported, Patient   hydrOXYzine (VISTARIL) 50 MG capsule Take 50 mg by mouth every 8 hours as needed  1/19/2020 at pm Yes Reported, Patient   ibuprofen (ADVIL/MOTRIN) 200 MG tablet Take 600 mg by mouth 4 times daily  as needed  prn at prn Yes Reported, Patient   methylphenidate (RITALIN) 20 MG tablet Take 20 mg by mouth 4 times daily  1/20/2020 at am x1 Yes Reported, Patient   multivitamin, therapeutic (THERA-VIT) TABS tablet Take 1 tablet by mouth daily Past Week at Unknown time Yes Reported, Patient   omeprazole (PRILOSEC) 40 MG DR capsule Take 40 mg by mouth every morning  1/20/2020 at am Yes Reported, Patient   oxyCODONE (OXYCONTIN) 40 MG 12 hr tablet Take 80 mg by mouth 4 times daily  1/20/2020 at am x1 Yes Reported, Patient   oxyCODONE IR (ROXICODONE) 15 MG tablet Take 30 mg by mouth 5 times daily  1/20/2020 at am x1 Yes Unknown, Entered By History   sertraline (ZOLOFT) 50 MG tablet Take 150 mg by mouth daily  1/19/2020 at am Yes Unknown, Entered By History   tiZANidine (ZANAFLEX) 2 MG capsule Take 6 mg by mouth daily  1/19/2020 at pm Yes Reported, Patient   triamterene-HCTZ (MAXZIDE-25) 37.5-25 MG tablet Take 1 tablet by mouth daily 1/20/2020 at am Yes Unknown, Entered By History   zolpidem ER (AMBIEN CR) 12.5 MG CR tablet Take 12.5 mg by mouth At Bedtime  1/19/2020 at pm Yes Unknown, Entered By History

## 2020-01-21 NOTE — PROGRESS NOTES
Patient has been assessed for Home Oxygen needs. Oxygen readings:    *Pulse oximetry (SpO2) = 95% on room air at rest while awake.    *SpO2 improved to 95% on 0liters/minute at rest.    *SpO2 = 90-92% on room air during activity/with exercise.

## 2020-01-21 NOTE — PLAN OF CARE
Observation goals PRIOR TO DISCHARGE     Comments:    -1. Ween oxygen-Not met, still needing oxygen to keep at above 90     -2. Transition to oral steroid regimen -Not met    Nurse to notify provider when observation goals have been met and patient is ready for discharge.        Pt is A&OX4, systolic sometimes elevated otherwise VSS on RA, sating 94-95% on 1l, weaned and was 92%on RA, dropped to 90% with movement.Pain controlled with prn oxy this shift, on lots of other meds for pain both PRN and scheduled.Up with SBA, amb to bathroom and voiding O.K, had lots of complaint about her previous admission and complained about her expectations not met,charge nurse spoke with her at length. Plan is to discharge back to prior living condition when 02 is weaned.

## 2020-10-02 ENCOUNTER — TELEPHONE (OUTPATIENT)
Dept: PHARMACY | Facility: CLINIC | Age: 65
End: 2020-10-02

## 2020-10-02 NOTE — TELEPHONE ENCOUNTER
Patient is calling asking for John to call her she would like to talk to him about her medical care. Medicines  Patient is aware that John will not be available until 10-6-20

## 2020-11-11 NOTE — PROGRESS NOTES
Patient taken to xray for a chest film and report was called to Jani RN on station [id number].   KIERAN WINTER    [id number]    68y      Female    CC: Lt knee pain s/p Lt TKR POD#1  Doing well, pain is well controlled , ambulated with PT, tolerating po, urinating without any issues. denies any light headedness /dizziness on ambulation    INTERVAL HPI/OVERNIGHT EVENTS: no acute events   Was found to have low puilse oximetry reading when she started walking and while in toilet. denies any SOB, was able to ambulate without any issues, sats remained above 93   ABG  was done overnight - normal     REVIEW OF SYSTEMS:    CONSTITUTIONAL: No fever fatigue  RESPIRATORY: No cough, wheezing, No shortness of breath  CARDIOVASCULAR: No chest pain, palpitations  GASTROINTESTINAL: No abdominal or epigastric pain. No nausea, vomiting     - reviewed - no significant desaturations noted.     Vital Signs Last 24 Hrs  T(C): 36.4 (11 Nov 2020 07:36), Max: 36.7 (10 Nov 2020 15:20)  T(F): 97.5 (11 Nov 2020 07:36), Max: 98 (10 Nov 2020 15:20)  HR: 56 (11 Nov 2020 07:36) (52 - 64)  BP: 135/87 (11 Nov 2020 07:36) (106/72 - 158/89)  BP(mean): --  RR: 18 (11 Nov 2020 07:36) (16 - 20)  SpO2: 99% (11 Nov 2020 09:11) (93% - 100%)    PHYSICAL EXAM:    GENERAL: NAD, well-groomed  HEENT: PERRL, +EOMI  NECK: soft, Supple  CHEST/LUNG: Clear to percussion bilaterally; No wheezing  HEART: S1S2+, Regular rate and rhythm; No murmurs  EXTREMITIES:  No clubbing, cyanosis, or edema  SKIN: No rashes or lesions  NEURO: AAOX3        11-10 @ 07:01  -  11-11 @ 07:00  --------------------------------------------------------  IN: 725 mL / OUT: 200 mL / NET: 525 mL        LABS:                        10.7   9.27  )-----------( 168      ( 11 Nov 2020 05:52 )             32.6     11-11    134<L>  |  100  |  24.0<H>  ----------------------------<  117<H>  5.3   |  23.0  |  1.15    Ca    8.8      11 Nov 2020 05:52      PT/INR - ( 10 Nov 2020 06:50 )   PT: 11.4 sec;   INR: 0.98 ratio         PTT - ( 10 Nov 2020 06:50 )  PTT:29.7 sec        MEDICATIONS  (STANDING):  acetaminophen   Tablet .. 975 milliGRAM(s) Oral every 8 hours  acetaminophen  IVPB .. 1000 milliGRAM(s) IV Intermittent once  ALBUTerol    90 MICROgram(s) HFA Inhaler 1 Puff(s) Inhalation every 4 hours  apixaban 2.5 milliGRAM(s) Oral two times a day  budesonide  80 MICROgram(s)/formoterol 4.5 MICROgram(s) Inhaler 2 Puff(s) Inhalation two times a day  buPROPion XL . 150 milliGRAM(s) Oral daily  cephalexin 500 milliGRAM(s) Oral four times a day  hydroxychloroquine 200 milliGRAM(s) Oral daily  ketorolac   Injectable 15 milliGRAM(s) IV Push every 6 hours  lactated ringers. 1000 milliLiter(s) (150 mL/Hr) IV Continuous <Continuous>  levothyroxine 150 MICROGram(s) Oral daily  metoprolol succinate ER 25 milliGRAM(s) Oral daily  polyethylene glycol 3350 17 Gram(s) Oral daily    MEDICATIONS  (PRN):  aluminum hydroxide/magnesium hydroxide/simethicone Suspension 30 milliLiter(s) Oral four times a day PRN Indigestion  diphenhydrAMINE 25 milliGRAM(s) Oral at bedtime PRN Insomnia  HYDROmorphone   Tablet 4 milliGRAM(s) Oral every 3 hours PRN Severe Pain (7 - 10)  HYDROmorphone  Injectable 0.5 milliGRAM(s) IV Push every 3 hours PRN Severe/ breakthrough Pain (7 - 10)  magnesium hydroxide Suspension 30 milliLiter(s) Oral daily PRN Constipation  ondansetron Injectable 4 milliGRAM(s) IV Push every 6 hours PRN Nausea and/or Vomiting  oxyCODONE    IR 5 milliGRAM(s) Oral every 3 hours PRN Mild Pain (1 - 3)  oxyCODONE    IR 10 milliGRAM(s) Oral every 3 hours PRN Moderate Pain (4 - 6)  senna 2 Tablet(s) Oral at bedtime PRN Constipation      RADIOLOGY & ADDITIONAL TESTS:

## 2021-09-30 ENCOUNTER — IMMUNIZATION (OUTPATIENT)
Dept: NURSING | Facility: CLINIC | Age: 66
End: 2021-09-30
Payer: COMMERCIAL

## 2021-09-30 PROCEDURE — 90471 IMMUNIZATION ADMIN: CPT

## 2021-09-30 PROCEDURE — 91300 PR COVID VAC PFIZER DIL RECON 30 MCG/0.3 ML IM: CPT

## 2021-09-30 PROCEDURE — 0003A PR ADMIN COVID VAC PFIZER, 3RD DOSE IMM COMP PT: CPT

## 2021-09-30 PROCEDURE — 90682 RIV4 VACC RECOMBINANT DNA IM: CPT

## 2023-11-17 ENCOUNTER — HOSPITAL ENCOUNTER (OUTPATIENT)
Facility: CLINIC | Age: 68
End: 2023-11-17
Attending: UROLOGY | Admitting: UROLOGY
Payer: COMMERCIAL

## 2023-12-06 RX ORDER — ACETAMINOPHEN 325 MG/1
975 TABLET ORAL ONCE
Status: CANCELLED | OUTPATIENT
Start: 2023-12-06 | End: 2023-12-06

## 2023-12-06 RX ORDER — CEFAZOLIN SODIUM 1 G/3ML
1 INJECTION, POWDER, FOR SOLUTION INTRAMUSCULAR; INTRAVENOUS ONCE
Status: CANCELLED | OUTPATIENT
Start: 2023-12-06

## 2023-12-06 RX ORDER — CIPROFLOXACIN 2 MG/ML
400 INJECTION, SOLUTION INTRAVENOUS ONCE
Status: CANCELLED | OUTPATIENT
Start: 2023-12-06 | End: 2023-12-06

## (undated) DEVICE — GLOVE PROTEXIS W/NEU-THERA 7.5  2D73TE75

## (undated) DEVICE — GLOVE PROTEXIS W/NEU-THERA 6.5  2D73TE65

## (undated) DEVICE — SU SILK 2 REEL 60" SA8H

## (undated) DEVICE — SU NDL CUT REV MED 3/8 209014

## (undated) DEVICE — DRAPE POUCH INSTRUMENT 1018

## (undated) DEVICE — PREP CHLORAPREP 26ML TINTED ORANGE  260815

## (undated) DEVICE — DRSG GAUZE 4X4" 3033

## (undated) DEVICE — DRAIN CHEST TUBE 28FR STR 8028

## (undated) DEVICE — ENDO TROCAR THORACIC 10/12MM TT012

## (undated) DEVICE — NDL 22GA 1.5"

## (undated) DEVICE — SUCTION DRY CHEST DRAIN OASIS 3600-100

## (undated) DEVICE — SU VICRYL 4-0 PS-2 18" UND J496H

## (undated) DEVICE — DRAIN CHEST TUBE RIGHT ANGLED 28FR 8128

## (undated) DEVICE — SUCTION CANISTER MEDIVAC LINER 3000ML W/LID 65651-530

## (undated) DEVICE — DRSG STERI STRIP 1/2X4" R1547

## (undated) DEVICE — SU VICRYL 2-0 CT-2 27" J333H

## (undated) DEVICE — SYR BULB IRRIG 50ML LATEX FREE 0035280

## (undated) DEVICE — DRAPE BREAST/CHEST 29420

## (undated) DEVICE — GOWN IMPERVIOUS ZONED LG

## (undated) DEVICE — SOL NACL 0.9% IRRIG 1000ML BOTTLE 07138-09

## (undated) DEVICE — TUBING SUCTION 12"X1/4" N612

## (undated) DEVICE — DRSG KERLIX 4 1/2"X4YDS ROLL 6715

## (undated) DEVICE — LINEN TOWEL PACK X5 5464

## (undated) DEVICE — ESU ELEC BLADE 6" COATED/INSULATED E1455-6

## (undated) DEVICE — PACK MINOR SBA15MIFSE

## (undated) DEVICE — DRSG BANDAID 3/4X3"

## (undated) DEVICE — DRAPE IOBAN INCISE 23X17" 6650EZ

## (undated) DEVICE — SU VICRYL 1 CT-2 27" J335H

## (undated) DEVICE — ESU GROUND PAD UNIVERSAL W/O CORD

## (undated) DEVICE — TUBING SUCTION MEDI-VAC SOFT 3/16"X20' N520A

## (undated) DEVICE — ANTIFOG SOLUTION W/FOAM PAD 31142527

## (undated) RX ORDER — FENTANYL CITRATE 50 UG/ML
INJECTION, SOLUTION INTRAMUSCULAR; INTRAVENOUS
Status: DISPENSED
Start: 2017-08-14

## (undated) RX ORDER — LIDOCAINE HYDROCHLORIDE 20 MG/ML
INJECTION, SOLUTION EPIDURAL; INFILTRATION; INTRACAUDAL; PERINEURAL
Status: DISPENSED
Start: 2017-08-14

## (undated) RX ORDER — PROPOFOL 10 MG/ML
INJECTION, EMULSION INTRAVENOUS
Status: DISPENSED
Start: 2017-08-14

## (undated) RX ORDER — DEXAMETHASONE SODIUM PHOSPHATE 4 MG/ML
INJECTION, SOLUTION INTRA-ARTICULAR; INTRALESIONAL; INTRAMUSCULAR; INTRAVENOUS; SOFT TISSUE
Status: DISPENSED
Start: 2017-08-14

## (undated) RX ORDER — KETAMINE HYDROCHLORIDE 10 MG/ML
INJECTION, SOLUTION INTRAMUSCULAR; INTRAVENOUS
Status: DISPENSED
Start: 2017-08-14

## (undated) RX ORDER — NALOXONE HYDROCHLORIDE 0.4 MG/ML
INJECTION, SOLUTION INTRAMUSCULAR; INTRAVENOUS; SUBCUTANEOUS
Status: DISPENSED
Start: 2017-05-24

## (undated) RX ORDER — ONDANSETRON 2 MG/ML
INJECTION INTRAMUSCULAR; INTRAVENOUS
Status: DISPENSED
Start: 2017-08-14

## (undated) RX ORDER — FENTANYL CITRATE 50 UG/ML
INJECTION, SOLUTION INTRAMUSCULAR; INTRAVENOUS
Status: DISPENSED
Start: 2017-05-24

## (undated) RX ORDER — BUPIVACAINE HYDROCHLORIDE 5 MG/ML
INJECTION, SOLUTION EPIDURAL; INTRACAUDAL
Status: DISPENSED
Start: 2017-08-14

## (undated) RX ORDER — PIPERACILLIN SODIUM, TAZOBACTAM SODIUM 3; .375 G/15ML; G/15ML
INJECTION, POWDER, LYOPHILIZED, FOR SOLUTION INTRAVENOUS
Status: DISPENSED
Start: 2017-08-14

## (undated) RX ORDER — FLUMAZENIL 0.1 MG/ML
INJECTION, SOLUTION INTRAVENOUS
Status: DISPENSED
Start: 2017-05-24

## (undated) RX ORDER — GLYCOPYRROLATE 0.2 MG/ML
INJECTION, SOLUTION INTRAMUSCULAR; INTRAVENOUS
Status: DISPENSED
Start: 2017-08-14